# Patient Record
Sex: MALE | Race: WHITE | Employment: OTHER | URBAN - METROPOLITAN AREA
[De-identification: names, ages, dates, MRNs, and addresses within clinical notes are randomized per-mention and may not be internally consistent; named-entity substitution may affect disease eponyms.]

---

## 2022-04-25 ENCOUNTER — HOSPITAL ENCOUNTER (INPATIENT)
Age: 87
LOS: 4 days | Discharge: HOME HEALTH | DRG: 871 | End: 2022-04-29
Attending: EMERGENCY MEDICINE | Admitting: HOSPITALIST
Payer: MEDICARE

## 2022-04-25 ENCOUNTER — APPOINTMENT (OUTPATIENT)
Dept: CT IMAGING | Age: 87
DRG: 871 | End: 2022-04-25
Attending: EMERGENCY MEDICINE
Payer: MEDICARE

## 2022-04-25 ENCOUNTER — APPOINTMENT (OUTPATIENT)
Dept: GENERAL RADIOLOGY | Age: 87
DRG: 871 | End: 2022-04-25
Attending: EMERGENCY MEDICINE
Payer: MEDICARE

## 2022-04-25 DIAGNOSIS — R50.9 FEVER, UNSPECIFIED FEVER CAUSE: ICD-10-CM

## 2022-04-25 DIAGNOSIS — N39.0 URINARY TRACT INFECTION WITHOUT HEMATURIA, SITE UNSPECIFIED: ICD-10-CM

## 2022-04-25 DIAGNOSIS — J10.1 INFLUENZA A: ICD-10-CM

## 2022-04-25 DIAGNOSIS — R41.82 ALTERED MENTAL STATUS, UNSPECIFIED ALTERED MENTAL STATUS TYPE: Primary | ICD-10-CM

## 2022-04-25 LAB
ALBUMIN SERPL-MCNC: 3.8 G/DL (ref 3.5–5)
ALBUMIN/GLOB SERPL: 1.1 {RATIO} (ref 1.1–2.2)
ALP SERPL-CCNC: 128 U/L (ref 45–117)
ALT SERPL-CCNC: 30 U/L (ref 12–78)
ANION GAP SERPL CALC-SCNC: 5 MMOL/L (ref 5–15)
APPEARANCE UR: ABNORMAL
AST SERPL-CCNC: 38 U/L (ref 15–37)
ATRIAL RATE: 71 BPM
BACTERIA URNS QL MICRO: ABNORMAL /HPF
BASOPHILS # BLD: 0 K/UL (ref 0–0.1)
BASOPHILS NFR BLD: 1 % (ref 0–1)
BILIRUB SERPL-MCNC: 0.7 MG/DL (ref 0.2–1)
BILIRUB UR QL: NEGATIVE
BUN SERPL-MCNC: 21 MG/DL (ref 6–20)
BUN/CREAT SERPL: 17 (ref 12–20)
CALCIUM SERPL-MCNC: 8.3 MG/DL (ref 8.5–10.1)
CALCULATED P AXIS, ECG09: 47 DEGREES
CALCULATED R AXIS, ECG10: 104 DEGREES
CALCULATED T AXIS, ECG11: 29 DEGREES
CHLORIDE SERPL-SCNC: 102 MMOL/L (ref 97–108)
CO2 SERPL-SCNC: 28 MMOL/L (ref 21–32)
COLOR UR: ABNORMAL
COMMENT, HOLDF: NORMAL
COVID-19 RAPID TEST, COVR: NOT DETECTED
CREAT SERPL-MCNC: 1.21 MG/DL (ref 0.7–1.3)
DIAGNOSIS, 93000: NORMAL
DIFFERENTIAL METHOD BLD: ABNORMAL
EOSINOPHIL # BLD: 0 K/UL (ref 0–0.4)
EOSINOPHIL NFR BLD: 0 % (ref 0–7)
EPITH CASTS URNS QL MICRO: ABNORMAL /LPF
ERYTHROCYTE [DISTWIDTH] IN BLOOD BY AUTOMATED COUNT: 13.9 % (ref 11.5–14.5)
FLUAV AG NPH QL IA: POSITIVE
FLUBV AG NOSE QL IA: NEGATIVE
GLOBULIN SER CALC-MCNC: 3.5 G/DL (ref 2–4)
GLUCOSE BLD STRIP.AUTO-MCNC: 93 MG/DL (ref 65–117)
GLUCOSE SERPL-MCNC: 94 MG/DL (ref 65–100)
GLUCOSE UR STRIP.AUTO-MCNC: NEGATIVE MG/DL
HCT VFR BLD AUTO: 45.4 % (ref 36.6–50.3)
HGB BLD-MCNC: 14.7 G/DL (ref 12.1–17)
HGB UR QL STRIP: ABNORMAL
IMM GRANULOCYTES # BLD AUTO: 0 K/UL (ref 0–0.04)
IMM GRANULOCYTES NFR BLD AUTO: 0 % (ref 0–0.5)
KETONES UR QL STRIP.AUTO: ABNORMAL MG/DL
LACTATE BLD-SCNC: 0.55 MMOL/L (ref 0.4–2)
LEUKOCYTE ESTERASE UR QL STRIP.AUTO: ABNORMAL
LYMPHOCYTES # BLD: 1.5 K/UL (ref 0.8–3.5)
LYMPHOCYTES NFR BLD: 18 % (ref 12–49)
MCH RBC QN AUTO: 29.9 PG (ref 26–34)
MCHC RBC AUTO-ENTMCNC: 32.4 G/DL (ref 30–36.5)
MCV RBC AUTO: 92.3 FL (ref 80–99)
MONOCYTES # BLD: 1.3 K/UL (ref 0–1)
MONOCYTES NFR BLD: 15 % (ref 5–13)
NEUTS SEG # BLD: 5.7 K/UL (ref 1.8–8)
NEUTS SEG NFR BLD: 66 % (ref 32–75)
NITRITE UR QL STRIP.AUTO: NEGATIVE
NRBC # BLD: 0 K/UL (ref 0–0.01)
NRBC BLD-RTO: 0 PER 100 WBC
P-R INTERVAL, ECG05: 128 MS
PH UR STRIP: 7.5 [PH] (ref 5–8)
PLATELET # BLD AUTO: 216 K/UL (ref 150–400)
PMV BLD AUTO: 10.3 FL (ref 8.9–12.9)
POTASSIUM SERPL-SCNC: 4.2 MMOL/L (ref 3.5–5.1)
PROT SERPL-MCNC: 7.3 G/DL (ref 6.4–8.2)
PROT UR STRIP-MCNC: 100 MG/DL
Q-T INTERVAL, ECG07: 396 MS
QRS DURATION, ECG06: 126 MS
QTC CALCULATION (BEZET), ECG08: 430 MS
RBC # BLD AUTO: 4.92 M/UL (ref 4.1–5.7)
RBC #/AREA URNS HPF: ABNORMAL /HPF (ref 0–5)
SAMPLES BEING HELD,HOLD: NORMAL
SERVICE CMNT-IMP: NORMAL
SODIUM SERPL-SCNC: 135 MMOL/L (ref 136–145)
SOURCE, COVRS: NORMAL
SP GR UR REFRACTOMETRY: 1.02 (ref 1–1.03)
TROPONIN-HIGH SENSITIVITY: 50 NG/L (ref 0–76)
UA: UC IF INDICATED,UAUC: ABNORMAL
UROBILINOGEN UR QL STRIP.AUTO: 1 EU/DL (ref 0.2–1)
VENTRICULAR RATE, ECG03: 71 BPM
WBC # BLD AUTO: 8.6 K/UL (ref 4.1–11.1)
WBC URNS QL MICRO: ABNORMAL /HPF (ref 0–4)

## 2022-04-25 PROCEDURE — 74011250636 HC RX REV CODE- 250/636: Performed by: HOSPITALIST

## 2022-04-25 PROCEDURE — 87086 URINE CULTURE/COLONY COUNT: CPT

## 2022-04-25 PROCEDURE — 99285 EMERGENCY DEPT VISIT HI MDM: CPT

## 2022-04-25 PROCEDURE — 87635 SARS-COV-2 COVID-19 AMP PRB: CPT

## 2022-04-25 PROCEDURE — 93005 ELECTROCARDIOGRAM TRACING: CPT

## 2022-04-25 PROCEDURE — 70450 CT HEAD/BRAIN W/O DYE: CPT

## 2022-04-25 PROCEDURE — 87804 INFLUENZA ASSAY W/OPTIC: CPT

## 2022-04-25 PROCEDURE — 80053 COMPREHEN METABOLIC PANEL: CPT

## 2022-04-25 PROCEDURE — 87186 SC STD MICRODIL/AGAR DIL: CPT

## 2022-04-25 PROCEDURE — 86710 INFLUENZA VIRUS ANTIBODY: CPT

## 2022-04-25 PROCEDURE — 85025 COMPLETE CBC W/AUTO DIFF WBC: CPT

## 2022-04-25 PROCEDURE — 87077 CULTURE AEROBIC IDENTIFY: CPT

## 2022-04-25 PROCEDURE — 84484 ASSAY OF TROPONIN QUANT: CPT

## 2022-04-25 PROCEDURE — 74011250637 HC RX REV CODE- 250/637: Performed by: HOSPITALIST

## 2022-04-25 PROCEDURE — 74011000250 HC RX REV CODE- 250: Performed by: HOSPITALIST

## 2022-04-25 PROCEDURE — 74011250636 HC RX REV CODE- 250/636: Performed by: EMERGENCY MEDICINE

## 2022-04-25 PROCEDURE — 81001 URINALYSIS AUTO W/SCOPE: CPT

## 2022-04-25 PROCEDURE — 74011250637 HC RX REV CODE- 250/637: Performed by: EMERGENCY MEDICINE

## 2022-04-25 PROCEDURE — 65270000046 HC RM TELEMETRY

## 2022-04-25 PROCEDURE — 82962 GLUCOSE BLOOD TEST: CPT

## 2022-04-25 PROCEDURE — 71045 X-RAY EXAM CHEST 1 VIEW: CPT

## 2022-04-25 PROCEDURE — 87040 BLOOD CULTURE FOR BACTERIA: CPT

## 2022-04-25 PROCEDURE — 77030019905 HC CATH URETH INTMIT MDII -A

## 2022-04-25 PROCEDURE — 83605 ASSAY OF LACTIC ACID: CPT

## 2022-04-25 PROCEDURE — 36415 COLL VENOUS BLD VENIPUNCTURE: CPT

## 2022-04-25 RX ORDER — HALOPERIDOL 5 MG/ML
2 INJECTION INTRAMUSCULAR
Status: COMPLETED | OUTPATIENT
Start: 2022-04-25 | End: 2022-04-25

## 2022-04-25 RX ORDER — SODIUM CHLORIDE 0.9 % (FLUSH) 0.9 %
5-40 SYRINGE (ML) INJECTION EVERY 8 HOURS
Status: DISCONTINUED | OUTPATIENT
Start: 2022-04-25 | End: 2022-04-29 | Stop reason: HOSPADM

## 2022-04-25 RX ORDER — SENNOSIDES 8.6 MG/1
2 TABLET ORAL
Status: DISCONTINUED | OUTPATIENT
Start: 2022-04-25 | End: 2022-04-29 | Stop reason: HOSPADM

## 2022-04-25 RX ORDER — HALOPERIDOL 5 MG/ML
2 INJECTION INTRAMUSCULAR
Status: DISCONTINUED | OUTPATIENT
Start: 2022-04-25 | End: 2022-04-29 | Stop reason: HOSPADM

## 2022-04-25 RX ORDER — ETHYL ALCOHOL 70 %
SOLUTION, NON-ORAL TOPICAL 2 TIMES DAILY
COMMUNITY

## 2022-04-25 RX ORDER — LEVOFLOXACIN 5 MG/ML
750 INJECTION, SOLUTION INTRAVENOUS
Status: DISCONTINUED | OUTPATIENT
Start: 2022-04-25 | End: 2022-04-27

## 2022-04-25 RX ORDER — SENNOSIDES 8.6 MG/1
2 TABLET ORAL
COMMUNITY

## 2022-04-25 RX ORDER — MEMANTINE HYDROCHLORIDE 10 MG/1
10 TABLET ORAL 2 TIMES DAILY
Status: DISCONTINUED | OUTPATIENT
Start: 2022-04-25 | End: 2022-04-29 | Stop reason: HOSPADM

## 2022-04-25 RX ORDER — HALOPERIDOL 5 MG/ML
2 INJECTION INTRAMUSCULAR
Status: DISCONTINUED | OUTPATIENT
Start: 2022-04-25 | End: 2022-04-25

## 2022-04-25 RX ORDER — MEMANTINE HYDROCHLORIDE 10 MG/1
10 TABLET ORAL 2 TIMES DAILY
COMMUNITY

## 2022-04-25 RX ORDER — SERTRALINE HYDROCHLORIDE 50 MG/1
25 TABLET, FILM COATED ORAL DAILY
Status: DISCONTINUED | OUTPATIENT
Start: 2022-04-26 | End: 2022-04-29 | Stop reason: HOSPADM

## 2022-04-25 RX ORDER — ACETAMINOPHEN 325 MG/1
325 TABLET ORAL
Status: DISCONTINUED | OUTPATIENT
Start: 2022-04-25 | End: 2022-04-25

## 2022-04-25 RX ORDER — HEPARIN SODIUM 5000 [USP'U]/ML
5000 INJECTION, SOLUTION INTRAVENOUS; SUBCUTANEOUS EVERY 8 HOURS
Status: DISCONTINUED | OUTPATIENT
Start: 2022-04-25 | End: 2022-04-29 | Stop reason: HOSPADM

## 2022-04-25 RX ORDER — ACETAMINOPHEN 325 MG/1
650 TABLET ORAL
Status: DISCONTINUED | OUTPATIENT
Start: 2022-04-25 | End: 2022-04-29 | Stop reason: HOSPADM

## 2022-04-25 RX ORDER — DONEPEZIL HYDROCHLORIDE 10 MG/1
10 TABLET, FILM COATED ORAL
Status: DISCONTINUED | OUTPATIENT
Start: 2022-04-25 | End: 2022-04-29 | Stop reason: HOSPADM

## 2022-04-25 RX ORDER — ACETAMINOPHEN 325 MG/1
325 TABLET ORAL
COMMUNITY

## 2022-04-25 RX ORDER — ACETAMINOPHEN 650 MG/1
650 SUPPOSITORY RECTAL
Status: COMPLETED | OUTPATIENT
Start: 2022-04-25 | End: 2022-04-25

## 2022-04-25 RX ORDER — QUETIAPINE FUMARATE 100 MG/1
50 TABLET, FILM COATED ORAL
Status: DISCONTINUED | OUTPATIENT
Start: 2022-04-25 | End: 2022-04-29 | Stop reason: HOSPADM

## 2022-04-25 RX ORDER — ACETAMINOPHEN 325 MG/1
650 TABLET ORAL
Status: ACTIVE | OUTPATIENT
Start: 2022-04-25 | End: 2022-04-25

## 2022-04-25 RX ORDER — SODIUM CHLORIDE 0.9 % (FLUSH) 0.9 %
5-40 SYRINGE (ML) INJECTION AS NEEDED
Status: DISCONTINUED | OUTPATIENT
Start: 2022-04-25 | End: 2022-04-29 | Stop reason: HOSPADM

## 2022-04-25 RX ADMIN — HALOPERIDOL LACTATE 2 MG: 5 INJECTION, SOLUTION INTRAMUSCULAR at 22:18

## 2022-04-25 RX ADMIN — HALOPERIDOL LACTATE 2 MG: 5 INJECTION, SOLUTION INTRAMUSCULAR at 14:39

## 2022-04-25 RX ADMIN — HALOPERIDOL LACTATE 2 MG: 5 INJECTION, SOLUTION INTRAMUSCULAR at 16:25

## 2022-04-25 RX ADMIN — ACETAMINOPHEN 650 MG: 650 SUPPOSITORY RECTAL at 08:06

## 2022-04-25 RX ADMIN — SODIUM CHLORIDE 1000 ML: 9 INJECTION, SOLUTION INTRAVENOUS at 07:34

## 2022-04-25 RX ADMIN — SODIUM CHLORIDE, PRESERVATIVE FREE 10 ML: 5 INJECTION INTRAVENOUS at 22:11

## 2022-04-25 RX ADMIN — LEVOFLOXACIN 750 MG: 5 INJECTION, SOLUTION INTRAVENOUS at 11:51

## 2022-04-25 RX ADMIN — HEPARIN SODIUM 5000 UNITS: 5000 INJECTION INTRAVENOUS; SUBCUTANEOUS at 22:09

## 2022-04-25 NOTE — PROGRESS NOTES
Admission Medication Reconciliation:    Information obtained from:  medication list from [facility] (scanned into media)  RxQuery data available¹:  NO    Comments/Recommendations: All medications/allergies have been reviewed and updated; all medication information obtained from medication papers from the [facility]. Changes made to Prior to Admission (PTA) Medication List:   ?   Medications Added:   - A&D ointment   ? Medications Changed:   - None   ? Medications Removed:   - None     ¹RxQuery pharmacy benefit data reflects medications filled and processed through the patient's insurance, however   this data does NOT capture whether the medication was picked up or is currently being taken by the patient. Allergies:  Lipitor [atorvastatin], Pcn [penicillins], and Pravastatin    Significant PMH/Disease States:   Past Medical History:   Diagnosis Date    Bleeding duodenal ulcer     BPH (benign prostatic hyperplasia) 10/9/2013    Cancer ([facility] [address]     melanoma    Carpal tunnel syndrome     DJD (degenerative joint disease)     GERD (gastroesophageal reflux disease) 1/25/2012    Glucose intolerance (impaired glucose tolerance) 7/20/2011    HTN (hypertension) 7/20/2011    Hyperlipidemia 7/20/2011    Lumbar spinal stenosis     Mastoiditis     Memory changes 8/20/2015    PAF (paroxysmal atrial fibrillation) ([facility] [address] 7/20/2011    RBBB (right bundle branch block)     Zoster        Chief Complaint for this Admission:    Chief Complaint   Patient presents with    Altered mental status     ED visit d/t AMS - comes from SNF ([place]), unknown LKWT - pt's son reports pt with diff ambulating / impulsive behavior / combative at times - mental baseline, was able to speak and have understanding of surround - temp per EMS was 102.0 / FS 85       Prior to Admission Medications:   Prior to Admission Medications   Prescriptions Last Dose Informant Patient Reported?  Taking?   acetaminophen (TylenoL) 325 mg tablet 4/18/2022 at Unknown time  Yes Yes   Sig: Take 325 mg by mouth every four (4) hours as needed for Pain. Indications: fever, pain   donepezil (ARICEPT) 10 mg tablet 4/18/2022 at Unknown time  No Yes   Sig: Take 1 Tab by mouth daily (after dinner). memantine (Namenda) 10 mg tablet 4/18/2022 at Unknown time  Yes Yes   Sig: Take 10 mg by mouth two (2) times a day. senna (Senna) 8.6 mg tablet 4/18/2022 at Unknown time  Yes Yes   Sig: Take 2 Tablets by mouth every six (6) hours as needed for Constipation. Indications: constipation   sertraline (ZOLOFT) 50 mg tablet 4/18/2022 at Unknown time  No Yes   Sig: Days 1-7, take 25 mg daily. Starting day 8 and thereafter, 50 mg daily   Patient taking differently: 25 mg. Days 1-7, take 25 mg daily. Starting day 8 and thereafter, 50 mg daily    simvastatin (ZOCOR) 5 mg tablet 4/18/2022 at Unknown time  No Yes   Sig: TAKE ONE (1) TABLET(S) DAILY AT BEDTIME   vitamin a & d (A&D) ointment   Yes Yes   Sig: Apply  to affected area two (2) times a day. Apply to sacrum      Facility-Administered Medications: None         Thank you for allowing pharmacy to participate in the coordination of this patient's care. If you have any other questions, please contact the medication reconciliation pharmacist at x [phone]. Chris Rodriguez Pharm. D., [facility]
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
## 2022-04-25 NOTE — ACP (ADVANCE CARE PLANNING)
Advance Care Planning     Advance Care Planning (ACP) Physician/NP/PA Conversation      Date of Conversation: 4/25/2022  Conducted with: Healthcare Decision Maker: Dm Figueroa and [address] Decision Maker:   No healthcare decision makers have been documented. Click here to complete [address] including selection of the Healthcare Decision Maker Relationship (ie \"Primary\")    Today we documented Decision Maker(s) consistent with Legal Next of Kin hierarchy. Care Preferences:    Hospitalization: \"If your health worsens and it becomes clear that your chance of recovery is unlikely, what would be your preference regarding hospitalization? \"  DNR    Ventilation: \"If you were unable to breathe on your own and your chance of recovery was unlikely, what would be your preference about the use of a ventilator (breathing machine) if it was available to you? \"   The patient would NOT desire the use of a ventilator. and DNR    Resuscitation: \"In the event your heart stopped as a result of an underlying serious health condition, would you want attempts to be made to restart your heart, or would you prefer a natural death? \"   No, do NOT attempt to resuscitate.       Conversation Outcomes / Follow-Up Plan:   ACP complete - no further action today  Reviewed DNR/DNI and patient elects DNR order - completed portable DNR form & placed order     Length of Voluntary ACP Conversation in minutes:  16 minutes    Princess Shakila MD
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
## 2022-04-25 NOTE — ED NOTES
Bedside and Verbal shift change report given to Bryanna Cline RN (oncoming nurse) by Boaz Gomes RN (offgoing nurse). Report included the following information SBAR, ED Summary, MAR and Recent Results.

## 2022-04-25 NOTE — PROGRESS NOTES
Renal Dosing/Monitoring  Medication: Levaquin  Current regimen:  750 mg IV every 24 hr  Recent Labs     04/25/22  0723   CREA 1.21   BUN 21*     Estimated CrCl:  46 ml/min  Plan:   Adjusted dose to 750 mg IV q48 hour for crcl 20-49 per protocol.
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
## 2022-04-25 NOTE — ED NOTES
TRANSFER - OUT REPORT:    Verbal report given to Kelsey(name) on Brock Maddox  being transferred to 2N room 207(unit) for routine progression of care       Report consisted of patients Situation, Background, Assessment and   Recommendations(SBAR). Information from the following report(s) SBAR, ED Summary, Intake/Output, MAR and Recent Results was reviewed with the receiving nurse. Lines:   Peripheral IV 04/25/22 Right Antecubital (Active)   Site Assessment Clean, dry, & intact 04/25/22 0724   Phlebitis Assessment 0 04/25/22 0724   Infiltration Assessment 0 04/25/22 0724   Dressing Status Clean, dry, & intact 04/25/22 0724   Dressing Type Tape;Transparent 04/25/22 0724        Opportunity for questions and clarification was provided.       Patient transported with:   [facility]

## 2022-04-25 NOTE — PROGRESS NOTES
Occupational Therapy   [phone]    Orders acknowledged and chart reviewed in prep for OT evaluation. RN reporting patient physically aggressive with staff during all care and reporting he is not appropriate for skilled intervention. Will defer and f/u as able. Thank you. [place] L. Valentino Bright, MS, OTR/L

## 2022-04-26 LAB
GLUCOSE BLD STRIP.AUTO-MCNC: 109 MG/DL (ref 65–117)
SERVICE CMNT-IMP: NORMAL

## 2022-04-26 PROCEDURE — 74011000250 HC RX REV CODE- 250: Performed by: HOSPITALIST

## 2022-04-26 PROCEDURE — 74011250636 HC RX REV CODE- 250/636: Performed by: HOSPITALIST

## 2022-04-26 PROCEDURE — 65270000046 HC RM TELEMETRY

## 2022-04-26 PROCEDURE — 97161 PT EVAL LOW COMPLEX 20 MIN: CPT

## 2022-04-26 PROCEDURE — 97530 THERAPEUTIC ACTIVITIES: CPT

## 2022-04-26 PROCEDURE — 74011250637 HC RX REV CODE- 250/637: Performed by: HOSPITALIST

## 2022-04-26 PROCEDURE — 82962 GLUCOSE BLOOD TEST: CPT

## 2022-04-26 PROCEDURE — 92610 EVALUATE SWALLOWING FUNCTION: CPT | Performed by: SPEECH-LANGUAGE PATHOLOGIST

## 2022-04-26 PROCEDURE — 97165 OT EVAL LOW COMPLEX 30 MIN: CPT

## 2022-04-26 PROCEDURE — 97535 SELF CARE MNGMENT TRAINING: CPT

## 2022-04-26 RX ORDER — GUAIFENESIN 100 MG/5ML
81 LIQUID (ML) ORAL DAILY
Status: DISCONTINUED | OUTPATIENT
Start: 2022-04-27 | End: 2022-04-29 | Stop reason: HOSPADM

## 2022-04-26 RX ORDER — DEXTROSE MONOHYDRATE AND SODIUM CHLORIDE 5; .45 G/100ML; G/100ML
75 INJECTION, SOLUTION INTRAVENOUS CONTINUOUS
Status: DISCONTINUED | OUTPATIENT
Start: 2022-04-26 | End: 2022-04-28

## 2022-04-26 RX ADMIN — MEMANTINE HYDROCHLORIDE 10 MG: 10 TABLET ORAL at 10:27

## 2022-04-26 RX ADMIN — SODIUM CHLORIDE, PRESERVATIVE FREE 10 ML: 5 INJECTION INTRAVENOUS at 06:02

## 2022-04-26 RX ADMIN — SERTRALINE 25 MG: 50 TABLET, FILM COATED ORAL at 10:27

## 2022-04-26 RX ADMIN — DEXTROSE AND SODIUM CHLORIDE 75 ML/HR: 5; 450 INJECTION, SOLUTION INTRAVENOUS at 10:27

## 2022-04-26 RX ADMIN — DONEPEZIL HYDROCHLORIDE 10 MG: 10 TABLET, FILM COATED ORAL at 18:50

## 2022-04-26 RX ADMIN — MEMANTINE HYDROCHLORIDE 10 MG: 10 TABLET ORAL at 18:50

## 2022-04-26 RX ADMIN — HEPARIN SODIUM 5000 UNITS: 5000 INJECTION INTRAVENOUS; SUBCUTANEOUS at 06:02

## 2022-04-26 RX ADMIN — HEPARIN SODIUM 5000 UNITS: 5000 INJECTION INTRAVENOUS; SUBCUTANEOUS at 15:00

## 2022-04-26 RX ADMIN — HALOPERIDOL LACTATE 2 MG: 5 INJECTION, SOLUTION INTRAMUSCULAR at 02:50

## 2022-04-26 RX ADMIN — SODIUM CHLORIDE, PRESERVATIVE FREE 10 ML: 5 INJECTION INTRAVENOUS at 14:00

## 2022-04-26 RX ADMIN — QUETIAPINE FUMARATE 50 MG: 100 TABLET ORAL at 21:59

## 2022-04-26 RX ADMIN — SODIUM CHLORIDE, PRESERVATIVE FREE 10 ML: 5 INJECTION INTRAVENOUS at 22:02

## 2022-04-26 RX ADMIN — HEPARIN SODIUM 5000 UNITS: 5000 INJECTION INTRAVENOUS; SUBCUTANEOUS at 21:59

## 2022-04-26 NOTE — PROGRESS NOTES
Transition of Care Plan   RUR- 11% Moderate Risk   DISPOSITION: The disposition plan is pending medical progression and recommendation.  F/U with PCP/Specialist     Transport: [facility] (memory care unit)  [address], [address] [facility]  Phone Number: [phone]    SW: Shanthi Hayeswell: [phone]    Reason for Admission:  \"unstable on feet and cursing\"                   RUR Score:  11% Moderate Risk                   Plan for utilizing home health: N/A         PCP: First and Last name:  Luke Pierre MD     Name of Practice: [facility] current patient: Yes/No: Yes   Approximate date of last visit: at facility, a week or so ago   Can you participate in a virtual visit with your PCP: N/A                    Current Advanced Directive/Advance Care Plan: DNR  Has ACP documentation on file at this time. Healthcare Decision Maker:   Click here to complete [address] including selection of the Healthcare Decision Maker Relationship (ie \"Primary\")             Primary Decision Maker: Elke Multani - Son - [phone]                  Transition of Care Plan:  Return to Memory Care Unit at [facility]                    Reviewed chart for transitions of care, and discussed in rounds. CM met with patient at bedside to explain role and offer support. CM met with patient and patients sons at bedside to complete this assessment. Baseline: DME (none) but has a cane  ADLs/IDALS: Needs assistance   Previous Home Health: N/A  Previous SNF/IPR: N/A  ER Contact:  Rosalie Nassar - [phone]    Patient lives on a memory care unit at [facility]. Patient needs assistance with ADLs/IDALs. Patient uses DMEs (none) - has a cane to ambulate. Patient's preferred pharmacy is at facility. Patient's son is expected to transport at discharge. Care Management Interventions  PCP Verified by CM:  Yes  Palliative Care Criteria Met (RRAT>21 & CHF Dx)?: No  Mode of Transport at Discharge: Other (see comment)  Transition of Care Consult (CM Consult): Discharge Planning  MyChart Signup: No  Discharge Durable Medical Equipment: No  Physical Therapy Consult: Yes  Occupational Therapy Consult: Yes  Speech Therapy Consult: No  Support Systems: Child(cristopher),Other Family Member(s)  Confirm Follow Up Transport: Family  The Patient and/or Patient Representative was Provided with a Choice of Provider and Agrees with the Discharge Plan?: Yes  Freedom of Choice List was Provided with Basic Dialogue that Supports the Patient's Individualized Plan of Care/Goals, Treatment Preferences and Shares the Quality Data Associated with the Providers?: Yes   Resource Information Provided?: No  Discharge Location  Patient Expects to be Discharged to[de-identified] Other:    Medicare pt has received, reviewed, and signed 1st IM letter informing them of their right to appeal the discharge. Signed copy has been placed on pt bedside chart.     WILL Franks, NICOLE, LMHP-e  Available in [employer]

## 2022-04-26 NOTE — PROGRESS NOTES
TRANSFER - IN REPORT:    Verbal report received from [facility]) on Adah Sender  being received from ED(unit) for routine progression of care      Report consisted of patients Situation, Background, Assessment and   Recommendations(SBAR). Information from the following report(s) SBAR, Kardex, [facility] and Recent Results was reviewed with the receiving nurse. Opportunity for questions and clarification was provided. Assessment completed upon patients arrival to unit and care assumed.

## 2022-04-26 NOTE — PROGRESS NOTES
[address] [facility] Adult  Hospitalist Group                                                                                          Hospitalist Progress Note  Glo Pina MD  Answering service: [phone] from in house phone        Date of Service:  2022  NAME:  Jj Kay  :  1933  MRN:  [id number]      Admission Summary: This is an 51-year-old  male with past medical history significant for memory impairment, paroxysmal AFib, hypertension, dyslipidemia, right bundle branch block, lumbar spinal stenosis, gastroesophageal reflux disease, and BPH, who is transferred from the nursing home after he became altered with agitation and restless. He had also fever of 103. It was unable to get detailed information from him. His son was at the bedside. He stated that the last time he saw him was on 2022. At that time, he was relatively stable but he had on and off confusion, agitation and restlessness then worse for the last couple of days. He said his Zoloft dose recently reduced. No cough. Patient denies cough, left-sided chest pain, nausea, vomiting, or abdominal pain. The patient was restless, agitated, and combative in ER, Haldol IV 2 mg was given, placed on restraints and his son was in the room. His vital signs were blood pressure of 130/96, pulse 118, temperature 102.1, saturation of oxygen 100% on room air. CT scan of the head was done and no acute intracranial findings. .  Chest x-ray, trace bilateral pleural effusion and mild patchy bibasilar airspace opacities which may represent atelectasis, and the patient referred to hospitalist service for further evaluation and admission. Interval history / Subjective:     Patient conscious and alert, restless, on soft restraint, trying to go out of bed, incoherent speech, follow simple command,   sitter at bedside.    His sons has visited him today    I tried to reach his son,  Vandana Miguel at [phone] and was a voice mail, will try later     Assessment & Plan:     Delirium, Acute metabolic encephalopathy with underlying dementia  -CT head no acute intracranial pathology   -patient is conscious and alert, disoriented, restless and agitated  -continue seroquel 50 mg q hs, prn haldol   -check EKG   -continue soft restraint  -continue neuro check and supportive care   -consult to psychiatrist   -continue sitter . UTI with sepsis POA  -fever, tachycardia, change in mental status   -continue IV levaquin, IVF  -follow up urine and blood cx result  -patient allergic to PCN    Possible pneumonia.  -chest x ray trace bilateral pleural effusions and mild patchy bibasilar airspace opacities which may represent atelectasis. -continue levaquin and IVF  -SpO2 90-94% on RA    HTN  -BP BP normal, monitor BP    Hx of Paroxysmal atrial fibrillation, now NSR  -add aspirin    Prepuce or foreskin meatus stricture.  -urologist is consulted    Hx of benign prostatic hyperplasia. -stable    Hx of dementia with agitation.  -on aricept, namenda, zoloft and  seroquel    Dyslipidemia.  -can resume zocor on discharge           Code status: DNR  Prophylaxis: Heparin  Care Plan discussed with:   Anticipated Disposition:      Hospital Problems  Date Reviewed: 1/31/2017          Codes Class Noted POA    Fever ICD-10-CM: R50.9  ICD-9-CM: 780.60  4/25/2022 Unknown               Vital Signs:    Last 24hrs VS reviewed since prior progress note.  Most recent are:  Visit Vitals  /70   Pulse 78   Temp 97.6 °F (36.4 °C)   Resp 13   Ht 6' (1.829 m)   Wt 79.4 kg (175 lb 0.7 oz)   SpO2 94%   BMI 23.74 kg/m²         Intake/Output Summary (Last 24 hours) at 4/26/2022 6101  Last data filed at 4/25/2022 4609  Gross per 24 hour   Intake 1000 ml   Output    Net 1000 ml        Physical Examination:     I had a face to face encounter with this patient and independently examined them on 4/26/2022 as outlined below:          Constitutional:  No acute distress, cooperative, pleasant    ENT:  Oral mucosa moist, oropharynx benign. Resp:  CTA bilaterally. No wheezing/rhonchi/rales. No accessory muscle use. CV:  Regular rhythm, normal rate, no murmurs, gallops, rubs    GI:  Soft, non distended, non tender. normoactive bowel sounds, no hepatosplenomegaly     Musculoskeletal:  No edema,     Neurologic:  Conscious and alert, disoriented, restless, moves all extremities             Data Review:    Review and/or order of clinical lab test  Review and/or order of tests in the radiology section of CPT  Review and/or order of tests in the medicine section of CPT      Labs:     Recent Labs     04/25/22 0723   WBC 8.6   HGB 14.7   HCT 45.4        Recent Labs     04/25/22 0723   *   K 4.2      CO2 28   BUN 21*   CREA 1.21   GLU 94   CA 8.3*     Recent Labs     04/25/22 0723   ALT 30   *   TBILI 0.7   TP 7.3   ALB 3.8   GLOB 3.5     No results for input(s): INR, PTP, APTT, INREXT in the last 72 hours. No results for input(s): FE, TIBC, PSAT, FERR in the last 72 hours. No results found for: FOL, RBCF   No results for input(s): PH, PCO2, PO2 in the last 72 hours. No results for input(s): CPK, CKNDX, TROIQ in the last 72 hours.     No lab exists for component: CPKMB  No results found for: CHOL, CHOLX, CHLST, CHOLV, HDL, HDLP, LDL, LDLC, DLDLP, TGLX, TRIGL, TRIGP, CHHD, CHHDX  Lab Results   Component Value Date/Time    Glucose (POC) 93 04/25/2022 07:22 AM    Glucose  (A) 10/09/2013 10:42 AM    Glucose  (A) 06/04/2013 10:16 AM    Glucose  (A) 09/26/2012 09:05 AM     Lab Results   Component Value Date/Time    Color YELLOW/STRAW 04/25/2022 09:11 AM    Appearance TURBID (A) 04/25/2022 09:11 AM    Specific gravity 1.018 04/25/2022 09:11 AM    pH (UA) 7.5 04/25/2022 09:11 AM    Protein 100 (A) 04/25/2022 09:11 AM    Glucose Negative 04/25/2022 09:11 AM    Ketone TRACE (A) 04/25/2022 09:11 AM    Bilirubin Negative 04/25/2022 09:11 AM    Urobilinogen 1.0 04/25/2022 09:11 AM    Nitrites Negative 04/25/2022 09:11 AM    Leukocyte Esterase LARGE (A) 04/25/2022 09:11 AM    Epithelial cells FEW 04/25/2022 09:11 AM    Bacteria 3+ (A) 04/25/2022 09:11 AM    WBC 20-50 04/25/2022 09:11 AM    RBC 0-5 04/25/2022 09:11 AM         Medications Reviewed:     Current Facility-Administered Medications   Medication Dose Route Frequency    donepeziL (ARICEPT) tablet 10 mg  10 mg Oral PCD    memantine (NAMENDA) tablet 10 mg  10 mg Oral BID    senna (SENOKOT) tablet 17.2 mg  2 Tablet Oral Q6H PRN    sertraline (ZOLOFT) tablet 25 mg  25 mg Oral DAILY    sodium chloride (NS) flush 5-40 mL  5-40 mL IntraVENous Q8H    sodium chloride (NS) flush 5-40 mL  5-40 mL IntraVENous PRN    heparin (porcine) injection 5,000 Units  5,000 Units SubCUTAneous Q8H    levoFLOXacin (LEVAQUIN) 750 mg in D5W IVPB  750 mg IntraVENous Q48H    acetaminophen (TYLENOL) tablet 650 mg  650 mg Oral Q4H PRN    haloperidol lactate (HALDOL) injection 2 mg  2 mg IntraMUSCular Q4H PRN    QUEtiapine (SEROquel) tablet 50 mg  50 mg Oral QHS     ______________________________________________________________________  EXPECTED LENGTH OF STAY: - - -  ACTUAL LENGTH OF STAY:          1                 Georgena Kehr, MD

## 2022-04-26 NOTE — PROGRESS NOTES
Problem: Dysphagia (Adult)  Goal: *Acute Goals and Plan of Care (Insert Text)  Description: Initiated 4/26/2022  1. Patient will tolerate regular diet with thin liquids free of sequelae of aspiration within 7 days. Outcome: Not Met   SPEECH LANGUAGE PATHOLOGY BEDSIDE SWALLOW EVALUATION  Patient: Jessy Hopkins (83 y.o. male)  Date: 4/26/2022  Primary Diagnosis: Fever [R50.9]        Precautions: fall, droplet       ASSESSMENT :  Based on the objective data described below, the patient presents with mild oral dysphagia related to xerostomia and lack of dentition. Suspect functional pharyngeal swallow. He coughed once at the end of the session, though not necessarily with PO intake. He is flu +. Son present for session and reports patient eats a regular diet at baseline. Patient will benefit from skilled intervention to address the above impairments. Patients rehabilitation potential is considered to be Good     PLAN :  Recommendations and Planned Interventions:  Regular diet, thin liquids  Feed when awake and alert  Frequency/Duration: Patient will be followed by speech-language pathology 1 time a week to address goals. Discharge Recommendations: To Be Determined     SUBJECTIVE:   Patient stated Dustin Christianches you for coming, you come back now.     OBJECTIVE:     Past Medical History:   Diagnosis Date    Bleeding duodenal ulcer     BPH (benign prostatic hyperplasia) 10/9/2013    Cancer ([facility] [address]     melanoma    Carpal tunnel syndrome     DJD (degenerative joint disease)     GERD (gastroesophageal reflux disease) 1/25/2012    Glucose intolerance (impaired glucose tolerance) 7/20/2011    HTN (hypertension) 7/20/2011    Hyperlipidemia 7/20/2011    Lumbar spinal stenosis     Mastoiditis     Memory changes 8/20/2015    PAF (paroxysmal atrial fibrillation) ([facility] [address] 7/20/2011    RBBB (right bundle branch block)     Zoster      Past Surgical History:   Procedure Laterality Date    HX APPENDECTOMY      HX OTHER SURGICAL      lung biopsy     HX OTHER SURGICAL      melanoma excision    HX TONSIL AND ADENOIDECTOMY          Diet prior to admission: regular diet, thin liquids  Current Diet:  NPO   Cognitive and Communication Status:  Neurologic State: Alert  Orientation Level: Oriented to person  Cognition: Follows commands     Perseveration: No perseveration noted     Oral Assessment:  Oral Assessment  Labial: No impairment  Dentition: Limited;Natural  Oral Hygiene: very dry  Lingual: No impairment  Mandible: No impairment  P.O. Trials:  Patient Position: up in bed  Vocal quality prior to P.O.: No impairment  Consistency Presented: Thin liquid; Solid;Puree  How Presented: SLP-fed/presented;Straw;Spoon     Bolus Acceptance: No impairment  Bolus Formation/Control: No impairment     Propulsion: No impairment  Oral Residue: None        Aspiration Signs/Symptoms: None                Oral Phase Severity: Mild  Pharyngeal Phase Severity : No impairment          Pain:  Pain Scale 1: Visual  Pain Intensity 1: 0       After treatment:   Patient left in no apparent distress in bed, Call bell within reach, Nursing notified, and Caregiver / family present    COMMUNICATION/EDUCATION:   Patient was educated regarding  purpose of SLP visit and recommendations. His son was present and verbalized understanding. The patient's plan of care including recommendations, planned interventions, and recommended diet changes were discussed with: Registered nurse. Patient is unable to participate in goal setting and plan of care.     Thank you for this referral.  NIKI Castillo  Time Calculation: 15 mins

## 2022-04-26 NOTE — PROGRESS NOTES
Problem: Self Care Deficits Care Plan (Adult)  Goal: *Acute Goals and Plan of Care (Insert Text)  Description: FUNCTIONAL STATUS PRIOR TO ADMISSION: Patient required S to maximum assistance for basic and instrumental ADLs at LTC/memory care unit, able to feed self per family report and facility staff assist with most ADLs, family reports S for walking transferring onto toilet with staff A for toileting and safety. HOME SUPPORT: The patient lived at memory care unit with support family present for evaluation providing PLOF as able. Occupational Therapy Goals  Initiated 4/26/2022  1. Patient will perform self-feeding with minimal assistance/contact guard assist within 7 day(s). 2.  Patient will perform grooming with minimal assistance/contact guard assist within 7 day(s). 3.  Patient will perform upper body dressing with minimal assistance/contact guard assist within 7 day(s). 4.  Patient will perform toilet transfers with minimal assistance/contact guard assist within 7 day(s). Outcome: Progressing Towards Goal     OCCUPATIONAL THERAPY EVALUATION  Patient: De Sacks (89 y.o. male)  Date: 4/26/2022  Primary Diagnosis: Fever [R50.9]        Precautions:   Fall,Skin,Aspiration    ASSESSMENT  Based on the objective data described below, the patient presents with impaired balance, decreased strength and activity tolerance, AMS oriented to self only, and impaired coordination with functional B UE use for ADLs, agreeable and pleasant with high guard due to confusion with difficulty reorienting, overall min A x2 for all functional mobility and transfers, able to sit EOB with impaired static balance leaning R with max A correction at times required, good righting reaction, with ability to sit without support for self feeding retraining, quick to fatigue requiring supine. Impaired coordination with functional tasks also affecting sitting balance and overall poor activity tolerance.     Wrist restraints removed then donned at end of session with family present. Will need increased A for ADLs at [address] [place] [address]. Current Level of Function Impacting Discharge (ADLs/self-care): up to total A, up to min A x2 for functional mobility with HHA x2    Functional Outcome Measure: The patient scored Total: 35/100 on the Barthel Index outcome measure which is indicative of being below baseline in basic self-care. Other factors to consider for discharge: dementia with AMS and agitation this admission     Patient will benefit from skilled therapy intervention to address the above noted impairments. PLAN :  Recommendations and Planned Interventions: self care training, functional mobility training, therapeutic exercise, balance training, therapeutic activities, endurance activities, and patient education    Frequency/Duration: Patient will be followed by occupational therapy 3 times a week to address goals. Recommendation for discharge: (in order for the patient to meet his/her long term goals)  To be determined: return to memory care unit    This discharge recommendation:  A follow-up discussion with the attending provider and/or case management is planned    IF patient discharges home will need the following DME: bedside commode       SUBJECTIVE:   Patient stated [employer] guys work for me a long time.  re: 2 sons in room    OBJECTIVE DATA SUMMARY:   HISTORY:   Past Medical History:   Diagnosis Date    Bleeding duodenal ulcer     BPH (benign prostatic hyperplasia) 10/9/2013    Cancer ([facility])     melanoma    Carpal tunnel syndrome     DJD (degenerative joint disease)     GERD (gastroesophageal reflux disease) 1/25/2012    Glucose intolerance (impaired glucose tolerance) 7/20/2011    HTN (hypertension) 7/20/2011    Hyperlipidemia 7/20/2011    Lumbar spinal stenosis     Mastoiditis     Memory changes 8/20/2015    PAF (paroxysmal atrial fibrillation) ([facility] [address] 7/20/2011    RBBB (right bundle branch block) Zoster      Past Surgical History:   Procedure Laterality Date    HX APPENDECTOMY      HX OTHER SURGICAL      lung biopsy     HX OTHER SURGICAL      melanoma excision    HX TONSIL AND ADENOIDECTOMY         Expanded or extensive additional review of patient history:     Home Situation  Home Environment: Long term care (memory care)  Support Systems: [facility]  Patient Expects to be Discharged to[de-identified] Other:    Hand dominance: Right    EXAMINATION OF PERFORMANCE DEFICITS:  Cognitive/Behavioral Status:  Neurologic State: Alert  Orientation Level: Oriented to person;Disoriented to place; Disoriented to situation;Disoriented to time  Cognition: Decreased attention/concentration;Decreased command following     Perseveration: No perseveration noted       Skin: fraile    Edema: B LE/ankles    Hearing:       Vision/Perceptual:                                     Range of Motion:  B UE stiff and rigid proximally  AROM: Generally decreased, functional  PROM: Generally decreased, functional                      Strength:  B UE  Strength: Generally decreased, functional                Coordination:     Fine Motor Skills-Upper: Left Impaired;Right Impaired         Tone & Sensation:  B UE intact                            Balance:  Sitting: Impaired; Without support; Intact; With support  Sitting - Static: Good (unsupported)  Sitting - Dynamic: Good (unsupported)  Standing: Impaired; With support  Standing - Static: Constant support; Fair  Standing - Dynamic : Fair;Constant support    Functional Mobility and Transfers for ADLs:  Bed Mobility:  Rolling: Contact guard assistance  Supine to Sit: Minimum assistance  Sit to Supine: Contact guard assistance  Scooting: Maximum assistance;Assist x2    Transfers:  Sit to Stand: Minimum assistance  Stand to Sit: Contact guard assistance  Bed to Chair: Minimum assistance; Additional time  Toilet Transfer : Minimum assistance; Additional time (BSC only)  Assistive Device :  (HHA)    ADL Assessment:  Feeding: Maximum assistance ([place] at times, dysmetria noted)    Oral Facial Hygiene/Grooming: Total assistance    Bathing: Total assistance    Upper Body Dressing: Total assistance    Lower Body Dressing: Total assistance    Toileting: Total assistance         Completed OT evaluation and ADLs seated EOB and standing as able with HHA x2 for balance. Educated on safety and endurance training with encouragement for full participation in ADLs while in hospital. Good understanding noted. ADL Intervention and task modifications:  Feeding  Food to Mouth: Maximum assistance; Compensatory technique training (max VCs, proximal elbow support, dysmetria)  Drink to Mouth: Moderate assistance  Cues: Visual/perceptual training/retraining;Verbal cues provided;Visual cues provided; Tactile cues provided;Physical assistance    Grooming  Washing Face: Total assistance (dependent) ([place], only able to hold on face, impaired coordinatino)       Patient instructed and indicated understanding the benefits of maintaining activity tolerance, functional mobility, and independence with self care tasks during acute stay  to ensure safe return home and to baseline. Encouraged patient to increase frequency and duration OOB, be out of bed for all meals, perform daily ADLs (as approved by RN/MD regarding bathing etc), and performing functional mobility to/from bathroom. Lower Body Dressing Assistance  Socks: Total assistance (dependent)              Therapeutic Exercise:     Functional Measure:    Barthel Index:  Bathin  Bladder: 0  Bowels: 5  Groomin  Dressin  Feedin  Mobility: 10  Stairs: 5  Toilet Use: 5  Transfer (Bed to Chair and Back): 10  Total: 35/100      The Barthel ADL Index: Guidelines  1. The index should be used as a record of what a patient does, not as a record of what a patient could do.   2. The main aim is to establish degree of independence from any help, physical or verbal, however minor and for whatever reason. 3. The need for supervision renders the patient not independent. 4. A patient's performance should be established using the best available evidence. Asking the patient, friends/relatives and nurses are the usual sources, but direct observation and common sense are also important. However direct testing is not needed. 5. Usually the patient's performance over the preceding 24-48 hours is important, but occasionally longer periods will be relevant. 6. Middle categories imply that the patient supplies over 50 per cent of the effort. 7. Use of aids to be independent is allowed. Score Interpretation (from [address] [facility] [address])    Independent   60-79 Minimally independent   40-59 Partially dependent   20-39 Very dependent   <20 Totally dependent     -Seb Spicer., Barthel, DTemoW. (1965). Functional evaluation: the Barthel Index. [address] [facility] ([address] (1997). The Barthel activities of daily living index: self-reporting versus actual performance in the old (> or = 75 years). Journal [address] 45(7), [address] [facility], ..., Ida Foster., Ginna Damico. (1999). Measuring the change in disability after inpatient rehabilitation; comparison of the responsiveness of the Barthel Index and Functional [place] Measure. Journal of Neurology, Neurosurgery, and Psychiatry, 66(4), 484-724. KELVIN Carson, ELIF Mccrary, & Alejandra Gold, M.A. (2004) Assessment of post-stroke quality of life in cost-effectiveness studies: The usefulness of the Barthel Index and the EuroQoL-5D.  Quality of Life Research, 15, 478-99     Occupational Therapy Evaluation Charge Determination   History Examination Decision-Making   LOW Complexity : Brief history review  HIGH Complexity : 5 or more performance deficits relating to physical, cognitive , or psychosocial skils that result in activity limitations and / or participation restrictions MEDIUM Complexity : Patient may present with comorbidities that affect occupational performnce. Miniml to moderate modification of tasks or assistance (eg, physical or verbal ) with assesment(s) is necessary to enable patient to complete evaluation       Based on the above components, the patient evaluation is determined to be of the following complexity level: LOW   Pain Rating:  None noted    Activity Tolerance:   Poor, requires frequent rest breaks, and observed SOB with activity    After treatment patient left in no apparent distress:    Supine in bed, Call bell within reach, Bed / chair alarm activated, Caregiver / family present, Side rails x 3, and Restraints    COMMUNICATION/EDUCATION:   The patients plan of care was discussed with: Physical therapist and Registered nurse. Home safety education was provided and the patient/caregiver indicated understanding., Patient/family have participated as able in goal setting and plan of care. , and Patient/family agree to work toward stated goals and plan of care. This patients plan of care is appropriate for delegation to [facility].     Thank you for this referral.  Aurora Calderon, DANILO  Time Calculation: 30 mins

## 2022-04-26 NOTE — H&P
[facility]  HISTORY AND PHYSICAL    Name:  Kallie Alvarado  MR#:  [id number]  :  1933  ACCOUNT #:  [de-identified]  ADMIT DATE:  2022      PRIMARY CARE PROVIDER:  Cleo Meraz MD    SOURCE OF INFORMATION:  His son and reviewing his medical record. CHIEF COMPLAINT:  Altered mental status since this morning. HISTORY OF PRESENT ILLNESS:  This is an 70-year-old  male with past medical history significant for memory impairment, paroxysmal AFib, hypertension, dyslipidemia, right bundle branch block, lumbar spinal stenosis, gastroesophageal reflux disease, and BPH, who is transferred from the nursing home after he became altered with agitation and restless. He had also fever of 103. It was unable to get detailed information from him. His son was at the bedside. He stated that the last time he saw him was on 2022. At that time, he was relatively stable but he had on and off confusion, agitation and restlessness then worse for the last couple of days. He said his Zoloft dose recently reduced. No cough. Patient denies cough, left-sided chest pain, nausea, vomiting, or abdominal pain. The patient was restless, agitated, and combative in ER, Haldol IV 2 mg was given, placed on restraints and his son was in the room. His vital signs were blood pressure of 130/96, pulse 118, temperature 102.1, saturation of oxygen 100% on room air. CT scan of the head was done and no acute intracranial findings. .  Chest x-ray, trace bilateral pleural effusion and mild patchy bibasilar airspace opacities which may represent atelectasis, and the patient referred to hospitalist service for further evaluation and admission. REVIEW OF SYSTEMS:  Pertinent positive finding mentioned in the HPI, but detailed information was unable to obtain from the patient. PAST MEDICAL HISTORY:  1. Hypertension. 2.  Memory impairment. 3.  Paroxysmal AFib. 4.  Right bundle branch block.   5. Lumbar spinal stenosis. 6.  Hyperlipidemia. 7.  Gastroesophageal reflux disease. 8.  BPH. MEDICATIONS:  Prior to admission medications include:  1. Namenda 10 mg p.o. b.i.d.  2.  Senna 8.6 mg p.o. every 6 hours as needed. 3.  Tylenol 650 every 6 hours as needed. 4.  Vitamin A and D two times daily. 5.  Zoloft 50 mg p.o. daily. 6.  Aricept 10 mg p.o. daily. 7.  Zocor 5 mg p.o. daily. ALLERGIES:  1. LIPITOR. 2.  PENICILLIN. 3.  PRAVASTATIN. SOCIAL HISTORY:  The patient from nursing home. Ambulates independently. No tobacco or alcohol abuse. Code status, DNR. FAMILY HISTORY:  Mother had a history of diabetes and heart disease. Father, history of diabetes, heart disease, and cancer. PHYSICAL EXAMINATION:  VITAL SIGNS:  Blood pressure was 105/62, pulse 59, temperature 99.6, respiratory rate 17, saturation of oxygen 98% on room air, and BMI 23.74 kg/m2. GENERAL APPEARANCE:  The patient is alert, restless, cooperative, not in acute cardiorespiratory distress. HEENT:  Pink conjunctivae and anicteric sclerae. Moist tongue and buccal mucosa. LUNGS:  Decreased bronchial breath sounds to auscultation bilaterally. CHEST WALL:  No tenderness or deformity. CARDIOVASCULAR:  Regular rate and rhythm. S1 and S2 normal.  No murmur or gallop. ABDOMEN:  Obese, soft, nontender. Bowel sounds normal.  No mass or organomegaly. EXTREMITIES:  No cyanosis or edema. SKIN:  No rash or lesion. GENITOURINARY TRACT SYSTEM:  His prepuce or foreskin meatus stenosis. CENTRAL NERVOUS SYSTEM:  The patient is conscious and alert but disoriented, agitated, restless. Moves all extremities. DIAGNOSTIC DATA:  EKG, sinus rhythm, ventricular rate 71 beats per minute, nonspecific right bundle branch block, nonspecific ST-T wave. CT of the head, no acute intracranial finding. Chest x-ray, trace bilateral pleural effusion and mild patchy bibasilar airspace opacities which may represent atelectasis.     LABORATORY DATA:  Hematology, white blood cell count 8.6, hemoglobin 14.7, hematocrit 45.4, MCV 92.3, platelet count 418. Urinalysis, white blood cell count 20-50, bacteria 3+, leukocyte esterase large, nitrites negative. Chemistry, sodium 135, potassium 4.2, chloride 102, CO2 of 28, anion gap 5, glucose 94, BUN 21, creatinine 1.21, BUN/creatinine ratio 17, calcium 8.3. Total bilirubin 0.7, total protein 7.3, albumin 3.8, ALT 30, AST 38, alkaline phosphatase 128. Troponin 50. ASSESSMENT:  1. Acute metabolic encephalopathy. 2.  Urinary tract infection with possible sepsis. 3.  Possible pneumonia. 4.  Hypertension. 5.  Paroxysmal atrial fibrillation. 6.  Prepuce or foreskin meatus stricture. 7.  History of benign prostatic hyperplasia. 8.  History of dementia with agitation. 9.  Dyslipidemia. PLAN:  1. Acute metabolic encephalopathy, likely due to UTI and pneumonia. Admit the patient to telemetry floor. CT scan negative for acute intracranial process. His agitation and combativeness likely due to urinary tract infection. ill give him IV Levaquin. The patient is allergic for penicillin.  P.r.n. Haldol. IV fluids. P.r.n. Tylenol. Continue neuro check, Haldol p.r.n., restraints, and sitter as needed and continue supportive care and Seroquel 25 mg p.o. at bedtime. 2.  Urinary tract infection with possible sepsis. The patient is allergic to penicillin. will give him Levaquin, IV fluids and follow up urine culture and blood culture. 3.  Possible pneumonia. Continue Levaquin, oxygen support, and follow up on blood culture. 4.  Hypertension. Blood pressure normal.  Continue home medication and monitor blood pressure. 5.  Paroxysmal AFib, rate controlled. Continue home medication, diltiazem and we will add aspirin. 6.  Foreskin or prepuce meatus stricture. The patient with history of BPH. Consult urologist.  It was difficult to catheterize and monitor and bladder scan.   7.  Dementia with agitation and restlessness. Continue home medications Aricept, Namenda, Zoloft, Seroquel 25 at bedtime, p.r.n. Haldol. The son mentioned that recently his Zoloft cut down and he is concerned whether it was related to reduced Zoloft dose, and explained to him that his fever with altered mental status likely due to urinary tract infection. will continue supportive care. 8.  Dyslipidemia. Continue home statin. DVT prophylaxis, heparin. FUNCTIONAL STATUS PRIOR TO ADMISSION:  Ambulates independently.         MD BEAN Mallory/[email or web address]  D:  04/25/2022 17:57  T:  04/25/2022 23:42  JOB #:  [id number]

## 2022-04-26 NOTE — PROGRESS NOTES
Problem: Mobility Impaired (Adult and Pediatric)  Goal: *Acute Goals and Plan of Care (Insert Text)  Description: FUNCTIONAL STATUS PRIOR TO ADMISSION: Pt poor historian, per conversation with sons, pt resident of memory care and was I with ambulation without AD PTA. Pt has hx of falls per reports from staff. HOME SUPPORT PRIOR TO ADMISSION: Per conversation with sons, pt required staff to assist with ADLs however the level of support is unclear. Physical Therapy Goals  Initiated 4/26/2022  1. Patient will move from supine to sit and sit to supine  in bed with modified independence within 7 day(s). 2.  Patient will transfer from bed to chair and chair to bed with modified independence using the least restrictive device within 7 day(s). 3.  Patient will perform sit to stand with modified independence within 7 day(s). 4.  Patient will ambulate with modified independence for 200 feet with the least restrictive device within 7 day(s). Outcome: Not Met     PHYSICAL THERAPY EVALUATION  Patient: Nadia Shaw (82 y.o. male)  Date: 4/26/2022  Primary Diagnosis: Fever [R50.9]        Precautions:  Fall,Skin,Aspiration      ASSESSMENT  Based on the objective data described below, the patient presents with decreased command following, awareness, balance, functional mobility, activity tolerance. Pt received supine in bed, in restraints, sitter and two sons present and agreeable to work with PT. Pt poor historian and PLOF and living situation provided by anita. Pt generally mobilized at a CGA to Min A level during session. Pt initially came supine>sit at EOB and demonstrated strong posterior lean which was only minimally corrected with VC/TC. Pt attempted to don hospital sock however was unable to maintain sitting balance demonstrating increased posterior lean and eventually PT donned socks for pt. Pt required Min A to successfully complete sit>stand transfer then ambulated in room with bilateral HHA.  Upon returning to bed, pt spent minute with forward flexed at hip straightening bed linens with single UE support on bed before transferring stand>sit and sat EOB ~ 5 minutes while OT worked with PT on eating with utensil. Pt initially displayed posterior trunk lean however if utensil presented outside of pts reach, pt would self correct trunk lean in order to grasp utensil and maintain stable sitting posture. Eventually pt returned to supine and session concluded. Current Level of Function Impacting Discharge (mobility/balance): Min A supine>sit and sit>stand, CGA for all other mobility    Functional Outcome Measure: The patient scored Total: 55/100 on the Barthel Index outcome measure which is indicative of being partially dependent in basic self-care. Other factors to consider for discharge: admitted from memory care, requiring sitter and restraints, UTI, hx falls     Patient will benefit from skilled therapy intervention to address the above noted impairments. PLAN :  Recommendations and Planned Interventions: bed mobility training, transfer training, gait training, therapeutic exercises, neuromuscular re-education, patient and family training/education, and therapeutic activities      Frequency/Duration: Patient will be followed by physical therapy:  4 times a week to address goals.     Recommendation for discharge: (in order for the patient to meet his/her long term goals)  Return to memory care    This discharge recommendation:  Has been made in collaboration with the attending provider and/or case management    IF patient discharges home will need the following DME: TBD however likely none         SUBJECTIVE:   Patient stated that's enough ice cream.    OBJECTIVE DATA SUMMARY:   HISTORY:    Past Medical History:   Diagnosis Date    Bleeding duodenal ulcer     BPH (benign prostatic hyperplasia) 10/9/2013    Cancer ([facility] [address]     melanoma    Carpal tunnel syndrome     DJD (degenerative joint disease)     GERD (gastroesophageal reflux disease) 1/25/2012    Glucose intolerance (impaired glucose tolerance) 7/20/2011    HTN (hypertension) 7/20/2011    Hyperlipidemia 7/20/2011    Lumbar spinal stenosis     Mastoiditis     Memory changes 8/20/2015    PAF (paroxysmal atrial fibrillation) ([facility] [address] 7/20/2011    RBBB (right bundle branch block)     Zoster      Past Surgical History:   Procedure Laterality Date    HX APPENDECTOMY      HX OTHER SURGICAL      lung biopsy     HX OTHER SURGICAL      melanoma excision    HX TONSIL AND ADENOIDECTOMY         Personal factors and/or comorbidities impacting plan of care: HTN, PAF    Home Situation  Home Environment: Long term care (memory care)  Support Systems: [facility]  Patient Expects to be Discharged to<[facility]> Other:    EXAMINATION/PRESENTATION/DECISION MAKING:   Critical Behavior:  Neurologic State: Alert  Orientation Level: Oriented to person,Disoriented to place,Disoriented to situation,Disoriented to time  Cognition: Decreased attention/concentration,Decreased command following    Range Of Motion:  AROM: Generally decreased, functional           PROM: Generally decreased, functional           Strength:    Strength: Generally decreased, functional       Functional Mobility:  Bed Mobility:  Rolling: Contact guard assistance  Supine to Sit: Minimum assistance  Sit to Supine: Contact guard assistance  Scooting: Maximum assistance;Assist x2  Transfers:  Sit to Stand: Minimum assistance  Stand to Sit: Contact guard assistance                       Balance:   Sitting: Impaired; Without support; Intact; With support  Sitting - Static: Good (unsupported)  Sitting - Dynamic: Good (unsupported)  Standing: Impaired; With support  Standing - Static: Constant support; Fair  Standing - Dynamic : Fair;Constant support  Ambulation/Gait Training:  Distance (ft): 30 Feet (ft)  Assistive Device: Gait belt  Ambulation - Level of Assistance: Minimal assistance;Assist x2 (bilateral HHA)        Gait Abnormalities: Lurching;Path deviations; Shuffling gait;Trunk sway increased        Base of Support: Widened;Center of gravity altered  Stance: Weight shift  Speed/Marjorie: Fluctuations  Step Length: Left shortened;Right shortened  Swing Pattern: Right asymmetrical;Left asymmetrical         Functional Measure:  Barthel Index:    Bathin  Bladder: 5  Bowels: 5  Groomin  Dressin  Feedin  Mobility: 10  Stairs: 5  Toilet Use: 5  Transfer (Bed to Chair and Back): 10  Total: 55/100       The Barthel ADL Index: Guidelines  1. The index should be used as a record of what a patient does, not as a record of what a patient could do. 2. The main aim is to establish degree of independence from any help, physical or verbal, however minor and for whatever reason. 3. The need for supervision renders the patient not independent. 4. A patient's performance should be established using the best available evidence. Asking the patient, friends/relatives and nurses are the usual sources, but direct observation and common sense are also important. However direct testing is not needed. 5. Usually the patient's performance over the preceding 24-48 hours is important, but occasionally longer periods will be relevant. 6. Middle categories imply that the patient supplies over 50 per cent of the effort. 7. Use of aids to be independent is allowed. Score Interpretation (from [address] [place] [address])    Independent   60-79 Minimally independent   40-59 Partially dependent   20-39 Very dependent   <20 Totally dependent     -Seb Spicer., Barthel, D.W. (1965). Functional evaluation: the Barthel Index. [address] [facility] ([address] (1997). The Barthel activities of daily living index: self-reporting versus actual performance in the old (> or = 75 years). Journal of [address] [place] [address] 45(7), [facility], J.J.M.F, Melo Asencio., Malaika Ingram. (1999). Measuring the change in disability after inpatient rehabilitation; comparison of the responsiveness of the Barthel Index and Functional [place] Measure. Journal of Neurology, Neurosurgery, and Psychiatry, 66(4), 333-509. CARLITOS Boswell.BARBARA, ELIF Mccrary, & Reba Monreal M.A. (2004) Assessment of post-stroke quality of life in cost-effectiveness studies: The usefulness of the Barthel Index and the EuroQoL-5D. Quality of Life Research, 15, 944-04        Physical Therapy Evaluation Charge Determination   History Examination Presentation Decision-Making   HIGH Complexity :3+ comorbidities / personal factors will impact the outcome/ POC  MEDIUM Complexity : 3 Standardized tests and measures addressing body structure, function, activity limitation and / or participation in recreation  LOW Complexity : Stable, uncomplicated  Other outcome measures barthel  MEDIUM      Based on the above components, the patient evaluation is determined to be of the following complexity level: LOW     Pain Rating:  Pt did not relate pain during session    Activity Tolerance:   Good, tolerates ADLs without rest breaks, and SpO2 stable on RA    After treatment patient left in no apparent distress:   Supine in bed, Call bell within reach, Caregiver / family present, Side rails x 3, Restraints, and sitter present    COMMUNICATION/EDUCATION:   The patients plan of care was discussed with: Occupational therapist and Registered nurse. Fall prevention education was provided and the patient/caregiver indicated understanding., Patient/family have participated as able in goal setting and plan of care. , and Patient/family agree to work toward stated goals and plan of care.     Thank you for this referral.  Roseline Nina, PT   Time Calculation: 24 mins

## 2022-04-27 LAB
ALBUMIN SERPL-MCNC: 3 G/DL (ref 3.5–5)
ALBUMIN/GLOB SERPL: 1 {RATIO} (ref 1.1–2.2)
ALP SERPL-CCNC: 105 U/L (ref 45–117)
ALT SERPL-CCNC: 32 U/L (ref 12–78)
ANION GAP SERPL CALC-SCNC: 6 MMOL/L (ref 5–15)
AST SERPL-CCNC: 65 U/L (ref 15–37)
BACTERIA SPEC CULT: ABNORMAL
BILIRUB SERPL-MCNC: 0.3 MG/DL (ref 0.2–1)
BUN SERPL-MCNC: 19 MG/DL (ref 6–20)
BUN/CREAT SERPL: 22 (ref 12–20)
CALCIUM SERPL-MCNC: 8.2 MG/DL (ref 8.5–10.1)
CC UR VC: ABNORMAL
CHLORIDE SERPL-SCNC: 107 MMOL/L (ref 97–108)
CO2 SERPL-SCNC: 27 MMOL/L (ref 21–32)
CREAT SERPL-MCNC: 0.88 MG/DL (ref 0.7–1.3)
ERYTHROCYTE [DISTWIDTH] IN BLOOD BY AUTOMATED COUNT: 13.7 % (ref 11.5–14.5)
GLOBULIN SER CALC-MCNC: 3.1 G/DL (ref 2–4)
GLUCOSE SERPL-MCNC: 104 MG/DL (ref 65–100)
HCT VFR BLD AUTO: 42.6 % (ref 36.6–50.3)
HGB BLD-MCNC: 14.1 G/DL (ref 12.1–17)
INFLUENZA A AB, IGG 828524: 2.65 IV
INFLUENZA A VIRUS IGM: 1.79 IV
INFLUENZA B VIRUS AB, IGG: 1.62 IV
INFLUENZA B VIRUS AB, IGM: 0.91 IV
MAGNESIUM SERPL-MCNC: 2.3 MG/DL (ref 1.6–2.4)
MCH RBC QN AUTO: 30.3 PG (ref 26–34)
MCHC RBC AUTO-ENTMCNC: 33.1 G/DL (ref 30–36.5)
MCV RBC AUTO: 91.4 FL (ref 80–99)
NRBC # BLD: 0 K/UL (ref 0–0.01)
NRBC BLD-RTO: 0 PER 100 WBC
PLATELET # BLD AUTO: 183 K/UL (ref 150–400)
PMV BLD AUTO: 10.2 FL (ref 8.9–12.9)
POTASSIUM SERPL-SCNC: 3.8 MMOL/L (ref 3.5–5.1)
PROT SERPL-MCNC: 6.1 G/DL (ref 6.4–8.2)
RBC # BLD AUTO: 4.66 M/UL (ref 4.1–5.7)
SERVICE CMNT-IMP: ABNORMAL
SODIUM SERPL-SCNC: 140 MMOL/L (ref 136–145)
WBC # BLD AUTO: 4.9 K/UL (ref 4.1–11.1)

## 2022-04-27 PROCEDURE — 65270000046 HC RM TELEMETRY

## 2022-04-27 PROCEDURE — 94760 N-INVAS EAR/PLS OXIMETRY 1: CPT

## 2022-04-27 PROCEDURE — 74011250636 HC RX REV CODE- 250/636: Performed by: HOSPITALIST

## 2022-04-27 PROCEDURE — 74011250637 HC RX REV CODE- 250/637: Performed by: HOSPITALIST

## 2022-04-27 PROCEDURE — 83735 ASSAY OF MAGNESIUM: CPT

## 2022-04-27 PROCEDURE — 97530 THERAPEUTIC ACTIVITIES: CPT

## 2022-04-27 PROCEDURE — 74011000250 HC RX REV CODE- 250: Performed by: HOSPITALIST

## 2022-04-27 PROCEDURE — 80053 COMPREHEN METABOLIC PANEL: CPT

## 2022-04-27 PROCEDURE — 36415 COLL VENOUS BLD VENIPUNCTURE: CPT

## 2022-04-27 PROCEDURE — 85027 COMPLETE CBC AUTOMATED: CPT

## 2022-04-27 RX ORDER — OSELTAMIVIR PHOSPHATE 75 MG/1
75 CAPSULE ORAL EVERY 12 HOURS
Status: DISCONTINUED | OUTPATIENT
Start: 2022-04-27 | End: 2022-04-29 | Stop reason: HOSPADM

## 2022-04-27 RX ORDER — LORAZEPAM 2 MG/ML
1 INJECTION INTRAMUSCULAR
Status: DISCONTINUED | OUTPATIENT
Start: 2022-04-27 | End: 2022-04-29 | Stop reason: HOSPADM

## 2022-04-27 RX ORDER — LEVOFLOXACIN 5 MG/ML
750 INJECTION, SOLUTION INTRAVENOUS EVERY 24 HOURS
Status: DISCONTINUED | OUTPATIENT
Start: 2022-04-27 | End: 2022-04-28 | Stop reason: CLARIF

## 2022-04-27 RX ADMIN — SERTRALINE 25 MG: 50 TABLET, FILM COATED ORAL at 09:49

## 2022-04-27 RX ADMIN — DEXTROSE AND SODIUM CHLORIDE 75 ML/HR: 5; 450 INJECTION, SOLUTION INTRAVENOUS at 00:20

## 2022-04-27 RX ADMIN — HEPARIN SODIUM 5000 UNITS: 5000 INJECTION INTRAVENOUS; SUBCUTANEOUS at 15:02

## 2022-04-27 RX ADMIN — SODIUM CHLORIDE, PRESERVATIVE FREE 10 ML: 5 INJECTION INTRAVENOUS at 15:02

## 2022-04-27 RX ADMIN — LEVOFLOXACIN 750 MG: 5 INJECTION, SOLUTION INTRAVENOUS at 11:55

## 2022-04-27 RX ADMIN — MEMANTINE HYDROCHLORIDE 10 MG: 10 TABLET ORAL at 09:49

## 2022-04-27 RX ADMIN — OSELTAMIVIR PHOSPHATE 75 MG: 75 CAPSULE ORAL at 11:56

## 2022-04-27 RX ADMIN — DEXTROSE AND SODIUM CHLORIDE 75 ML/HR: 5; 450 INJECTION, SOLUTION INTRAVENOUS at 21:13

## 2022-04-27 RX ADMIN — OSELTAMIVIR PHOSPHATE 75 MG: 75 CAPSULE ORAL at 21:20

## 2022-04-27 RX ADMIN — HEPARIN SODIUM 5000 UNITS: 5000 INJECTION INTRAVENOUS; SUBCUTANEOUS at 06:41

## 2022-04-27 RX ADMIN — QUETIAPINE FUMARATE 50 MG: 100 TABLET ORAL at 21:20

## 2022-04-27 RX ADMIN — ASPIRIN 81 MG 81 MG: 81 TABLET ORAL at 09:49

## 2022-04-27 RX ADMIN — SODIUM CHLORIDE, PRESERVATIVE FREE 10 ML: 5 INJECTION INTRAVENOUS at 06:26

## 2022-04-27 RX ADMIN — HEPARIN SODIUM 5000 UNITS: 5000 INJECTION INTRAVENOUS; SUBCUTANEOUS at 21:20

## 2022-04-27 NOTE — PROGRESS NOTES
Clinical Pharmacy Note: Antibiotic Renal Dosing    Medication: Levaquin 750 mg Q48H  Indication:  CAP, UTI    Recent Labs     22  0407 22  0723   WBC 4.9 8.6   CREA 0.88 1.21   BUN 19 21*     Temp (24hrs), Av.2 °F (36.8 °C), Min:97.7 °F (36.5 °C), Max:98.8 °F (37.1 °C)    Body mass index is 23.74 kg/m². Estimated Creatinine Clearance: 63.7 mL/min (based on SCr of 0.88 mg/dL). Plan: Change to 750 mg Q24H per [facility] P&T approved dosing protocol.

## 2022-04-27 NOTE — PROGRESS NOTES
Chart reviewed, cleared by RN for PT treatment. Upon entering room pt observed supine in bed, snoring, not responding to voice commands. Will f/u as able and appropriate.     Diya Laura PT, DPT

## 2022-04-27 NOTE — PROGRESS NOTES
Problem: Mobility Impaired (Adult and Pediatric)  Goal: *Acute Goals and Plan of Care (Insert Text)  Description: FUNCTIONAL STATUS PRIOR TO ADMISSION: Pt poor historian, per conversation with sons, pt resident of memory care and was I with ambulation without AD PTA. Pt has hx of falls per reports from staff. HOME SUPPORT PRIOR TO ADMISSION: Per conversation with sons, pt required staff to assist with ADLs however the level of support is unclear. Physical Therapy Goals  Initiated 4/26/2022  1. Patient will move from supine to sit and sit to supine  in bed with modified independence within 7 day(s). 2.  Patient will transfer from bed to chair and chair to bed with modified independence using the least restrictive device within 7 day(s). 3.  Patient will perform sit to stand with modified independence within 7 day(s). 4.  Patient will ambulate with modified independence for 200 feet with the least restrictive device within 7 day(s). Outcome: Progressing Towards Goal     PHYSICAL THERAPY TREATMENT  Patient: Iftikhar Nixon (64 y.o. male)  Date: 4/27/2022  Diagnosis: Fever [R50.9] <principal problem not specified>       Precautions: Fall,Skin,Aspiration  Chart, physical therapy assessment, plan of care and goals were reviewed. ASSESSMENT  Patient continues with skilled PT services and is progressing towards goals. Pt generally mobilized at a CGA to Min A level during session. Pt received supine in bed, drowsy, in restraints but agreeable to participate in therapy with family in room. Pt vitals taken, unremarkable, not recorded. Pt able to come sitting EOB with good command following however often requires redirection. Pt then sat EOB for ~ 5 minutes however required multiple VC to maintain sitting posture with feet on floor. Pt then performed sit>stand with RW, gait belt, and Min A and was able to stand with CGA ~2 minutes while brief changed. Pt required VC for proper hand placement on RW.  Declined ambulation this session secondary to fatigued and decreased alertness. Pt then assisted back to supine in bed and positioned for comfort before session concluded. Current Level of Function Impacting Discharge (mobility/balance): Min A for supine>sit, sit>stand, CGA for all other mobility, limited ambulation    Other factors to consider for discharge: UTI, altered mental state         PLAN :  Patient continues to benefit from skilled intervention to address the above impairments. Continue treatment per established plan of care. to address goals. Recommendation for discharge: (in order for the patient to meet his/her long term goals)  Return to memory care    This discharge recommendation:  Has been made in collaboration with the attending provider and/or case management    IF patient discharges home will need the following DME: to be determined (TBD)       SUBJECTIVE:   Patient stated thanks.     OBJECTIVE DATA SUMMARY:   Critical Behavior:  Neurologic State: Alert,Confused  Orientation Level: Disoriented to place,Disoriented to situation,Disoriented to time,Oriented to person  Cognition: Follows commands     Functional Mobility Training:  Bed Mobility:  Rolling: Contact guard assistance  Supine to Sit: Minimum assistance  Sit to Supine: Contact guard assistance  Scooting: Minimum assistance        Transfers:  Sit to Stand: Minimum assistance  Stand to Sit: Contact guard assistance                             Balance:  Sitting: Intact; With support  Sitting - Static: Good (unsupported)  Sitting - Dynamic: Fair (occasional)  Standing: Impaired; With support  Standing - Static: Fair;Constant support  Standing - Dynamic : Fair;Constant support    Pain Rating:  Pt did not relate pain during session    Activity Tolerance:   Fair, tolerates ADLs without rest breaks, and SpO2 stable on RA    After treatment patient left in no apparent distress:   Supine in bed, Heels elevated for pressure relief, Call bell within reach, Caregiver / family present, Side rails x 3, and Restraints    COMMUNICATION/COLLABORATION:   The patients plan of care was discussed with: Registered nurse.      Juana Olivares, PT   Time Calculation: 27 mins

## 2022-04-27 NOTE — PROGRESS NOTES
SLP Contact Note    Pt tolerating diet without adverse effects nor overt difficulty. No further SLP needs. Will sign off.       Thank you,  YARELIS HoskinsEd, [id number] [facility]  Speech-Language Pathologist

## 2022-04-27 NOTE — PROGRESS NOTES
Transition of Care Plan  · RUR- 11% Moderate Risk  · DISPOSITION: The disposition plan is pending medical progression and recommendation. · F/U with PCP/Specialist    · Transport: [facility] (memory care unit)  [address], [address] [facility]  Phone Number: [phone]  SW: Tesha Docker: [phone]    At 11:50am - CM received a call from [phone] at facility who requested update. Per this morning's IDR's patient to likely discharge back to facility tomorrow pending medical stability.     Valencia Rivera, WILL, CRM, LMHP-e  Available in Perfect Serve

## 2022-04-27 NOTE — PROGRESS NOTES
[address] [facility] Adult  Hospitalist Group                                                                                          Hospitalist Progress Note  Princess Shakila MD  Answering service: [phone] from in house phone        Date of Service:  2022  NAME:  Merari Gomez  :  1933  MRN:  [id number]      Admission Summary: This is an 55-year-old  male with past medical history significant for memory impairment, paroxysmal AFib, hypertension, dyslipidemia, right bundle branch block, lumbar spinal stenosis, gastroesophageal reflux disease, and BPH, who is transferred from the nursing home after he became altered with agitation and restless. He had also fever of 103. It was unable to get detailed information from him. His son was at the bedside. He stated that the last time he saw him was on 2022. At that time, he was relatively stable but he had on and off confusion, agitation and restlessness then worse for the last couple of days. He said his Zoloft dose recently reduced. No cough. Patient denies cough, left-sided chest pain, nausea, vomiting, or abdominal pain. The patient was restless, agitated, and combative in ER, Haldol IV 2 mg was given, placed on restraints and his son was in the room. His vital signs were blood pressure of 130/96, pulse 118, temperature 102.1, saturation of oxygen 100% on room air. CT scan of the head was done and no acute intracranial findings. .  Chest x-ray, trace bilateral pleural effusion and mild patchy bibasilar airspace opacities which may represent atelectasis, and the patient referred to hospitalist service for further evaluation and admission. Interval history / Subjective:     Patient conscious and alert, calm today, working with PT    His sons [employer] in the room, updated the clinical finding, care plan, and answered questions.      Assessment & Plan:     Delirium, Acute metabolic encephalopathy with underlying dementia  -CT head no acute intracranial pathology   -patient is conscious and alert, disoriented, restless and agitated  -continue seroquel 50 mg q hs, prn haldol and ativan  -EKG normal sinus rhythm vent rate 71 bpm non specific st t wave  -continue soft restraint  -continue neuro check and supportive care   -seen by psychiatrist   -continue sitter . UTI with sepsis POA  -fever, tachycardia, change in mental status   -continue IV levaquin, IVF  -urine culture grow proteus Mirabilis, sensitive to levaquin  -blood cx no growth so far  -patient allergic to PCN    Possible pneumonia, Influenza A infection  -on iv levaquin, Tamiflu and IVF  -chest x ray trace bilateral pleural effusions and mild patchy bibasilar airspace opacities which may represent atelectasis. -continue levaquin and IVF  -afebrile no leukocytosis  -SpO2 90-94% on RA    HTN  -BP BP normal, monitor BP    Hx of Paroxysmal atrial fibrillation, now NSR  -continue aspirin  -not on BB or CCB    Prepuce or foreskin meatus stricture.  -urologist is consulted    Hx of benign prostatic hyperplasia. -stable    Hx of dementia with agitation.  -on aricept, namenda, zoloft and  seroquel    Dyslipidemia.  -can resume zocor on discharge           Code status: DNR  Prophylaxis: Heparin  Care Plan discussed with: Patient, his son at bed side, nurse and CM  Anticipated Disposition: [facility] care unit >48 hrs     Hospital Problems  Date Reviewed: 1/31/2017          Codes Class Noted POA    Fever ICD-10-CM: R50.9  ICD-9-CM: 780.60  4/25/2022 Unknown               Vital Signs:    Last 24hrs VS reviewed since prior progress note.  Most recent are:  Visit Vitals  BP (!) (P) 105/48 (BP 1 Location: Left upper arm)   Pulse (P) 79   Temp (P) 98.8 °F (37.1 °C)   Resp (P) 18   Ht 6' (1.829 m)   Wt 79.4 kg (175 lb 0.7 oz)   SpO2 (P) 100%   BMI 23.74 kg/m²       No intake or output data in the 24 hours ending 04/27/22 1007     Physical Examination:     I had a face to face encounter with this patient and independently examined them on 4/27/2022 as outlined below:          Constitutional:  No acute distress, cooperative, pleasant    ENT:  Oral mucosa moist, oropharynx benign. Resp:  CTA bilaterally. No wheezing/rhonchi/rales. No accessory muscle use. CV:  Regular rhythm, normal rate, no murmurs, gallops, rubs    GI:  Soft, non distended, non tender. normoactive bowel sounds, no hepatosplenomegaly     Musculoskeletal:  No edema,     Neurologic:  Conscious and alert,  disoriented, moves all extremities             Data Review:    Review and/or order of clinical lab test  Review and/or order of tests in the radiology section of CPT  Review and/or order of tests in the medicine section of CPT      Labs:     Recent Labs     04/27/22 0407 04/25/22  0723   WBC 4.9 8.6   HGB 14.1 14.7   HCT 42.6 45.4    216     Recent Labs     04/27/22 0407 04/25/22  0723    135*   K 3.8 4.2    102   CO2 27 28   BUN 19 21*   CREA 0.88 1.21   * 94   CA 8.2* 8.3*   MG 2.3  --      Recent Labs     04/27/22 0407 04/25/22  0723   ALT 32 30    128*   TBILI 0.3 0.7   TP 6.1* 7.3   ALB 3.0* 3.8   GLOB 3.1 3.5     No results for input(s): INR, PTP, APTT, INREXT, INREXT in the last 72 hours. No results for input(s): FE, TIBC, PSAT, FERR in the last 72 hours. No results found for: FOL, RBCF   No results for input(s): PH, PCO2, PO2 in the last 72 hours. No results for input(s): CPK, CKNDX, TROIQ in the last 72 hours.     No lab exists for component: CPKMB  No results found for: CHOL, CHOLX, CHLST, CHOLV, HDL, HDLP, LDL, LDLC, DLDLP, TGLX, TRIGL, TRIGP, CHHD, CHHDX  Lab Results   Component Value Date/Time    Glucose (POC) 109 04/26/2022 11:02 AM    Glucose (POC) 93 04/25/2022 07:22 AM    Glucose  (A) 10/09/2013 10:42 AM    Glucose  (A) 06/04/2013 10:16 AM    Glucose  (A) 09/26/2012 09:05 AM     Lab Results   Component Value Date/Time    Color YELLOW/STRAW 04/25/2022 09:11 AM    Appearance TURBID (A) 04/25/2022 09:11 AM    Specific gravity 1.018 04/25/2022 09:11 AM    pH (UA) 7.5 04/25/2022 09:11 AM    Protein 100 (A) 04/25/2022 09:11 AM    Glucose Negative 04/25/2022 09:11 AM    Ketone TRACE (A) 04/25/2022 09:11 AM    Bilirubin Negative 04/25/2022 09:11 AM    Urobilinogen 1.0 04/25/2022 09:11 AM    Nitrites Negative 04/25/2022 09:11 AM    Leukocyte Esterase LARGE (A) 04/25/2022 09:11 AM    Epithelial cells FEW 04/25/2022 09:11 AM    Bacteria 3+ (A) 04/25/2022 09:11 AM    WBC 20-50 04/25/2022 09:11 AM    RBC 0-5 04/25/2022 09:11 AM         Medications Reviewed:     Current Facility-Administered Medications   Medication Dose Route Frequency    LORazepam (ATIVAN) injection 1 mg  1 mg IntraMUSCular Q6H PRN    dextrose 5 % - 0.45% NaCl infusion  75 mL/hr IntraVENous CONTINUOUS    aspirin chewable tablet 81 mg  81 mg Oral DAILY    donepeziL (ARICEPT) tablet 10 mg  10 mg Oral PCD    memantine (NAMENDA) tablet 10 mg  10 mg Oral BID    senna (SENOKOT) tablet 17.2 mg  2 Tablet Oral Q6H PRN    sertraline (ZOLOFT) tablet 25 mg  25 mg Oral DAILY    sodium chloride (NS) flush 5-40 mL  5-40 mL IntraVENous Q8H    sodium chloride (NS) flush 5-40 mL  5-40 mL IntraVENous PRN    heparin (porcine) injection 5,000 Units  5,000 Units SubCUTAneous Q8H    levoFLOXacin (LEVAQUIN) 750 mg in D5W IVPB  750 mg IntraVENous Q48H    acetaminophen (TYLENOL) tablet 650 mg  650 mg Oral Q4H PRN    haloperidol lactate (HALDOL) injection 2 mg  2 mg IntraMUSCular Q4H PRN    QUEtiapine (SEROquel) tablet 50 mg  50 mg Oral QHS     ______________________________________________________________________  EXPECTED LENGTH OF STAY: - - -  ACTUAL LENGTH OF STAY:          2                 Pura Ford MD

## 2022-04-27 NOTE — CONSULTS
Requesting Provider: Susanne Valenzuela MD - Reason for Consultation: \"meatal stenosis\"  Pre-existing [facility] Patient:   No                Patient: Danny Nicolas MRN: [id number]  SSN: [id number]    YOB: 1933  Age: 80 y.o. Sex: male     Location: 207/02       Code Status: DNR   PCP: Maverick Marcelino MD  - [phone]   Emergency Contact:  Primary Emergency Contact: Yuval Bell, Home Phone: [phone]   Race/Amish/Language: Jesus Fong / Karen Gilliland / Alicja Johnson   Payor: Payor: Alise Jordan / Plan: Anders Babinski / Product Type: Medicare /    Prior Admission Data: 11/2/16 [facility] EMERGENCY DEPT     Hospitalized:  Hospital Day: 3 - Admitted 4/25/2022  7:11 AM     CONSULTANTS  IP CONSULT TO PSYCHIATRY  IP CONSULT TO [address] [place] [address]    ICD-10-CM ICD-9-CM   1. Altered mental status, unspecified altered mental status type  R41.82 780.97   2. Fever, unspecified fever cause  R50.9 780.60   3. Urinary tract infection without hematuria, site unspecified  N39.0 599.0   4. Influenza A  J10.1 487.1         Assessment/Plan:       · UTI - await sensitivities then culture specific abx. · Meatal stenosis - Bladder scan 180 cc. Okay to continue to void independently at this time. Will arrange outpatient follow up for further eval and management. Urology signing off. Please call for questions.      Supervising MD, Dr. Marylin Parsons      CC: Altered mental status (ED visit d/t AMS - comes from [facility]), unknown LKWT - pt's son reports pt with diff ambulating / impulsive behavior / combative at times - mental baseline, was able to speak and have understanding of surround - temp per EMS was 102.0 / FS 80)   HPI: He is a 80 y.o. male past medical history significant for memory impairment with agitation, paroxysmal AFib, hypertension, dyslipidemia, right bundle branch block, lumbar spinal stenosis, gastroesophageal reflux disease, and BPH, who is transferred from his nursing home for AMS with a fever. He has been treated with IV Levaquin for sepsis 2/2 presumed UTI or pneumonia PTA. He is now afebrile. VSS. WBC wnl. Cr wnl. UA +20-50 WBC, 3+ bacteria. His urine culture grew probable proteus. BCx NGTD. He is seen in consult by Urology as a new patient to  for meatal stenosis. The patient is confused and is not able to add to hx collection or ROS. He is voiding independently in a brief, incontinent of urine. Bladder scan 181 cc. The patient is confused, therefore, it is not a perfect post-void result. Temp (24hrs), Av.3 °F (36.8 °C), Min:97.7 °F (36.5 °C), Max:98.8 °F (37.1 °C)    Urinary Status: Voiding,Diaper  Creatinine   Date/Time Value Ref Range Status   2022 04:07 AM 0.88 0.70 - 1.30 MG/DL Final   2022 07:23 AM 1.21 0.70 - 1.30 MG/DL Final   2017 11:01 AM 1.05 0.76 - 1.27 mg/dL Final   2017 10:59 AM 0.99 0.76 - 1.27 mg/dL Final   2016 10:39 AM 0.91 0.76 - 1.27 mg/dL Final     Current Antimicrobial Therapy (168h ago, onward)     Ordered     Start Stop    22 1100  levoFLOXacin (LEVAQUIN) 750 mg in D5W IVPB  750 mg,   IntraVENous,   EVERY 24 HOURS        References:    Lexicomp    22 1200 22 1159    22 1033  oseltamivir (TAMIFLU) capsule 75 mg  75 mg,   Oral,   EVERY 12 HOURS        References:    Lexicomp    22 1100 22 0859              Key Anti-Platelet Anticoagulant Meds     The patient is on no antiplatelet meds or anticoagulants.         Diet: ADULT DIET Regular -       Labs     Lab Results   Component Value Date/Time    WBC 4.9 2022 04:07 AM    HCT 42.6 2022 04:07 AM    PLATELET 121  04:07 AM    Sodium 140 2022 04:07 AM    Potassium 3.8 2022 04:07 AM    Chloride 107 2022 04:07 AM    CO2 27 2022 04:07 AM    BUN 19 2022 04:07 AM    Creatinine 0.88 2022 04:07 AM    Glucose 104 (H) 2022 04:07 AM    Calcium 8.2 (L) 2022 04:07 AM    Magnesium 2.3 04/27/2022 04:07 AM    INR 1.1 10/09/2016 07:33 AM    Prostate Specific Ag 1.7 02/04/2013 10:04 AM     UA:   Lab Results   Component Value Date/Time    Color YELLOW/STRAW 04/25/2022 09:11 AM    Appearance TURBID (A) 04/25/2022 09:11 AM    Specific gravity 1.018 04/25/2022 09:11 AM    pH (UA) 7.5 04/25/2022 09:11 AM    Protein 100 (A) 04/25/2022 09:11 AM    Glucose Negative 04/25/2022 09:11 AM    Ketone TRACE (A) 04/25/2022 09:11 AM    Bilirubin Negative 04/25/2022 09:11 AM    Urobilinogen 1.0 04/25/2022 09:11 AM    Nitrites Negative 04/25/2022 09:11 AM    Leukocyte Esterase LARGE (A) 04/25/2022 09:11 AM    Epithelial cells FEW 04/25/2022 09:11 AM    Bacteria 3+ (A) 04/25/2022 09:11 AM    WBC 20-50 04/25/2022 09:11 AM    RBC 0-5 04/25/2022 09:11 AM     Imaging     Results for orders placed during the hospital encounter of 04/25/22    CT HEAD WO CONT    Narrative  INDICATION: pupillary changes/AMS    EXAM: CT HEAD without contrast.    TECHNIQUE: Unenhanced CT Head is performed. CT dose reduction was achieved  through use of a standardized protocol tailored for this examination and  automatic exposure control for dose modulation. FINDINGS: Brain parenchyma shows no CT apparent ischemia. There is no apparent  mass on unenhanced imaging. There is no bleed, shift, obstructive hydrocephalus  or significant extra-axial fluid collection. Bone windows are unremarkable. Impression  No acute intracranial finding. US Results (most recent):  No results found for this or any previous visit.       Cultures     All Micro Results     Procedure Component Value Units Date/Time    CULTURE, BLOOD, PAIRED [[id number]] Collected: 04/25/22 0853    Order Status: Completed Specimen: Blood Updated: 04/27/22 6113     Special Requests: NO SPECIAL REQUESTS        Culture result: NO GROWTH 2 DAYS       CULTURE, URINE [[id number]]  (Abnormal) Collected: 04/25/22 0911    Order Status: Completed Specimen: Urine Updated: 04/26/22 1516     Special Requests: --        NO SPECIAL REQUESTS  Reflexed from [id number]       [place] Count --        >100,000  COLONIES/mL       Culture result: PROBABLE PROTEUS SPECIES       COVID-19 RAPID TEST [[id number]] Collected: 04/25/22 0740    Order Status: Completed Specimen: Nasopharyngeal Updated: 04/25/22 0814     Specimen source Nasopharyngeal        COVID-19 rapid test Not detected        Comment: Rapid Abbott ID Now       Rapid NAAT:  The specimen is NEGATIVE for SARS-CoV-2, the novel coronavirus associated with COVID-19. Negative results should be treated as presumptive and, if inconsistent with clinical signs and symptoms or necessary for patient management, should be tested with an alternative molecular assay. Negative results do not preclude SARS-CoV-2 infection and should not be used as the sole basis for patient management decisions. This test has been authorized by the FDA under an Emergency Use Authorization (EUA) for use by authorized laboratories.    Fact sheet for Healthcare Providers: Mobile Event Guide.co.nz  Fact sheet for Patients: Mobile Event Guide.co.nz       Methodology: Isothermal Nucleic Acid Amplification                Past History: (Complete 2+/3 areas)     Allergies   Allergen Reactions    Lipitor [Atorvastatin] Other (comments)     arthralgia    Pcn [Penicillins] Unknown (comments)    Pravastatin Unknown (comments)      Current Facility-Administered Medications   Medication Dose Route Frequency    LORazepam (ATIVAN) injection 1 mg  1 mg IntraMUSCular Q6H PRN    oseltamivir (TAMIFLU) capsule 75 mg  75 mg Oral Q12H    levoFLOXacin (LEVAQUIN) 750 mg in D5W IVPB  750 mg IntraVENous Q24H    dextrose 5 % - 0.45% NaCl infusion  75 mL/hr IntraVENous CONTINUOUS    aspirin chewable tablet 81 mg  81 mg Oral DAILY    donepeziL (ARICEPT) tablet 10 mg  10 mg Oral PCD    memantine (NAMENDA) tablet 10 mg  10 mg Oral BID    senna (SENOKOT) tablet 17.2 mg  2 Tablet Oral Q6H PRN    sertraline (ZOLOFT) tablet 25 mg  25 mg Oral DAILY    sodium chloride (NS) flush 5-40 mL  5-40 mL IntraVENous Q8H    sodium chloride (NS) flush 5-40 mL  5-40 mL IntraVENous PRN    heparin (porcine) injection 5,000 Units  5,000 Units SubCUTAneous Q8H    acetaminophen (TYLENOL) tablet 650 mg  650 mg Oral Q4H PRN    haloperidol lactate (HALDOL) injection 2 mg  2 mg IntraMUSCular Q4H PRN    QUEtiapine (SEROquel) tablet 50 mg  50 mg Oral QHS    Prior to Admission medications    Medication Sig Start Date End Date Taking? Authorizing Provider   memantine (Namenda) 10 mg tablet Take 10 mg by mouth two (2) times a day. Yes Other, MD Nila   senna (Senna) 8.6 mg tablet Take 2 Tablets by mouth every six (6) hours as needed for Constipation. Indications: constipation   Yes Marcia, MD Nila   acetaminophen (TylenoL) 325 mg tablet Take 325 mg by mouth every four (4) hours as needed for Pain. Indications: fever, pain   Yes Marcia, MD Nila   vitamin a & d (A&D) ointment Apply  to affected area two (2) times a day. Apply to sacrum   Yes Provider, Historical   sertraline (ZOLOFT) 50 mg tablet Days 1-7, take 25 mg daily. Starting day 8 and thereafter, 50 mg daily  Patient taking differently: 25 mg. Days 1-7, take 25 mg daily. Starting day 8 and thereafter, 50 mg daily  12/20/16  Yes Mercy Kurtz IV, MD   donepezil (ARICEPT) 10 mg tablet Take 1 Tab by mouth daily (after dinner).  11/22/16  Yes Mercy Kurtz IV, MD   simvastatin (ZOCOR) 5 mg tablet TAKE ONE (1) TABLET(S) DAILY AT BEDTIME 9/15/16  Yes Devin Hernandez MD        PMHx:  has a past medical history of Bleeding duodenal ulcer, BPH (benign prostatic hyperplasia) (10/9/2013), Cancer ([facility] [address] Carpal tunnel syndrome, DJD (degenerative joint disease), GERD (gastroesophageal reflux disease) (1/25/2012), Glucose intolerance (impaired glucose tolerance) (7/20/2011), HTN (hypertension) (7/20/2011), Hyperlipidemia (7/20/2011), Lumbar spinal stenosis, Mastoiditis, Memory changes (8/20/2015), PAF (paroxysmal atrial fibrillation) ([facility] [address] (7/20/2011), RBBB (right bundle branch block), and Zoster. PSurgHx:  has a past surgical history that includes hx other surgical; hx other surgical; hx tonsil and adenoidectomy; and hx appendectomy. PSocHx:  reports that he has quit smoking. He does not have any smokeless tobacco history on file. He reports that he does not drink alcohol and does not use drugs.    ROS:  (Complete - 10 systems) - PAT 2/2 confusion    Physical Exam: (Comprehesive - 8+ 1995 Systems)     (1) Constitutional:  NAD; resting  FIO2:   on SpO2: O2 Sat (%): 100 %  O2 Device: None (Room air)    Patient Vitals for the past 24 hrs:   BP Temp Pulse Resp SpO2   04/27/22 1202 108/67 98.7 °F (37.1 °C)   100 %   04/27/22 1200   68     04/27/22 1000   74     04/27/22 0953 (!) 105/48 98.8 °F (37.1 °C) 79 18 100 %   04/27/22 0800   82     04/27/22 0151 136/73 98.1 °F (36.7 °C) 79 18 94 %   04/27/22 0100     93 %   04/26/22 2330 110/74 97.8 °F (36.6 °C) 88 19 92 %   04/26/22 2303 (!) 111/58 98.7 °F (37.1 °C) 84 19 95 %   04/26/22 2004 128/62 97.7 °F (36.5 °C) 62 20 94 %   04/26/22 1800   75     04/26/22 1619 117/66 98.4 °F (36.9 °C) 64 18 95 %   04/26/22 1400   83         Date 04/26/22 0700 - 04/27/22 0659 04/27/22 0700 - 04/28/22 0659   Shift 6250-5546 1353-9614 24 Hour Total 1108-5348 6445-1278 24 Hour Total   INTAKE   I.V.(mL/kg/hr)    75  75     Volume (dextrose 5 % - 0.45% NaCl infusion)    75  75   Shift Total(mL/kg)    75(0.9)  75(0.9)   OUTPUT   Shift Total(mL/kg)         NET    75  75   Weight (kg) 79.4 79.4 79.4 79.4 79.4 79.4      (2) ENMT:   moist mucous membranes, normal sinuses   (3) Respiratory:  breathing easily, no distress   (4) GI:  no abdominal masses, no tenderness   (5) :   Voiding independently, yellow UA, meatal stenosis    (6) Lymphatic:  no adenopathy, neck supple (7) Muscloskeletal:  no gross deformity, normal ROM   (8) Skin:  no rash, warm & dry   (9) Neuro:  Alert, confused, in wrist restraints      Signed By: Indigo Garcia NP  - April 27, 2022

## 2022-04-27 NOTE — CONSULTS
PSYCHIATRY CONSULT NOTE    REASON FOR CONSULT: Evaluation of agitation      HISTORY OF PRESENTING COMPLAINT:  Duc He is a 80 y.o. WHITE/NON- male who is currently admitted to the medical floor at [facility]. Details of his admission is well documented in his chart. Patient was sent to the ED for further evaluation due to agitation. Reportedly, his pcp decreased his sedative medications because patient was a little more lethargic. Psych was consulted for the evaluation of dementia with agitation. Patient was observed with restraints on. Son was at the bedside. Patient was drowsy, responded to his name, murmured a few words and closed his eyes back. Unable to get any history from him. Staff reported that patient has been verbally and physically aggressive towards staff. PAST PSYCHIATRIC HISTORY: hx of depression, anxiety and dementia     SUBSTANCE ABUSE HISTORY: unknown      PAST MEDICAL HISTORY:    Please see H&P for details. Past Medical History:   Diagnosis Date    Bleeding duodenal ulcer     BPH (benign prostatic hyperplasia) 10/9/2013    Cancer (HCC)     melanoma    Carpal tunnel syndrome     DJD (degenerative joint disease)     GERD (gastroesophageal reflux disease) 1/25/2012    Glucose intolerance (impaired glucose tolerance) 7/20/2011    HTN (hypertension) 7/20/2011    Hyperlipidemia 7/20/2011    Lumbar spinal stenosis     Mastoiditis     Memory changes 8/20/2015    PAF (paroxysmal atrial fibrillation) ([facility] [address] 7/20/2011    RBBB (right bundle branch block)     Zoster      Prior to Admission medications    Medication Sig Start Date End Date Taking? Authorizing Provider   memantine (Namenda) 10 mg tablet Take 10 mg by mouth two (2) times a day. Yes Other, MD Nila   senna (Senna) 8.6 mg tablet Take 2 Tablets by mouth every six (6) hours as needed for Constipation.  Indications: constipation   Yes Other, MD Nila   acetaminophen (TylenoL) 325 mg tablet Take 325 mg by mouth every four (4) hours as needed for Pain. Indications: fever, pain   Yes Other, MD Nila   vitamin a & d (A&D) ointment Apply  to affected area two (2) times a day. Apply to sacrum   Yes Provider, Historical   sertraline (ZOLOFT) 50 mg tablet Days 1-7, take 25 mg daily. Starting day 8 and thereafter, 50 mg daily  Patient taking differently: 25 mg. Days 1-7, take 25 mg daily. Starting day 8 and thereafter, 50 mg daily  12/20/16  Yes Abiodun Collier IV, MD   donepezil (ARICEPT) 10 mg tablet Take 1 Tab by mouth daily (after dinner). 11/22/16  Yes Abiodun Collier IV, MD   simvastatin (ZOCOR) 5 mg tablet TAKE ONE (1) TABLET(S) DAILY AT BEDTIME 9/15/16  Yes Jose Thompson MD     Vitals:    04/26/22 2303 04/26/22 2330 04/27/22 0100 04/27/22 0151   BP: (!) 111/58 110/74  136/73   Pulse: 84 88  79   Resp: 19 19  18   Temp: 98.7 °F (37.1 °C) 97.8 °F (36.6 °C)  98.1 °F (36.7 °C)   SpO2: 95% 92% 93% 94%   Weight:       Height:         Lab Results   Component Value Date/Time    WBC 4.9 04/27/2022 04:07 AM    HGB (POC) 13.6 12/20/2016 11:11 AM    HGB 14.1 04/27/2022 04:07 AM    HCT (POC) 41.3 12/20/2016 11:11 AM    HCT 42.6 04/27/2022 04:07 AM    PLATELET 761 55/37/8079 04:07 AM    MCV 91.4 04/27/2022 04:07 AM     Lab Results   Component Value Date/Time    Sodium 140 04/27/2022 04:07 AM    Potassium 3.8 04/27/2022 04:07 AM    Chloride 107 04/27/2022 04:07 AM    CO2 27 04/27/2022 04:07 AM    Anion gap 6 04/27/2022 04:07 AM    Glucose 104 (H) 04/27/2022 04:07 AM    BUN 19 04/27/2022 04:07 AM    Creatinine 0.88 04/27/2022 04:07 AM    BUN/Creatinine ratio 22 (H) 04/27/2022 04:07 AM    GFR est AA >60 04/27/2022 04:07 AM    GFR est non-AA >60 04/27/2022 04:07 AM    Calcium 8.2 (L) 04/27/2022 04:07 AM    Bilirubin, total 0.3 04/27/2022 04:07 AM    Alk.  phosphatase 105 04/27/2022 04:07 AM    Protein, total 6.1 (L) 04/27/2022 04:07 AM    Albumin 3.0 (L) 04/27/2022 04:07 AM    Globulin 3.1 04/27/2022 04:07 AM A-G Ratio 1.0 (L) 04/27/2022 04:07 AM    ALT (SGPT) 32 04/27/2022 04:07 AM    AST (SGOT) 65 (H) 04/27/2022 04:07 AM     No results found for: VALF2, VALAC, VALP, VALPR, DS6, CRBAM, CRBAMP, CARB2, XCRBAM  No results found for: LITHM  RADIOLOGY REPORTS:(reviewed/updated 4/27/2022)  CT HEAD WO CONT    Result Date: 4/25/2022  INDICATION: pupillary changes/AMS EXAM: CT HEAD without contrast. TECHNIQUE: Unenhanced CT Head is performed. CT dose reduction was achieved through use of a standardized protocol tailored for this examination and automatic exposure control for dose modulation. FINDINGS: Brain parenchyma shows no CT apparent ischemia. There is no apparent mass on unenhanced imaging. There is no bleed, shift, obstructive hydrocephalus or significant extra-axial fluid collection. Bone windows are unremarkable. No acute intracranial finding. XR CHEST PORT    Result Date: 4/25/2022  EXAM: XR CHEST PORT INDICATION: AMS COMPARISON: 6/25/2007 FINDINGS: A portable AP radiograph of the chest was obtained at 745 hours. Cardiac monitoring leads are noted. . Lung lines are decreased. Mild patchy bibasilar airspace opacities. Trace bilateral pleural effusions. . The cardiac and mediastinal contours and pulmonary vascularity are normal.  The bones and soft tissues are grossly within normal limits. Trace bilateral pleural effusions and mild patchy bibasilar airspace opacities which may represent atelectasis. No results found for: Luly [place] [id number], [id number], [id number], PREGU, POCHCG, MHCGN, HCGQR, THCGA1, SHCG, HCGN, HCGSERUM, HCGURQLPOC    PSYCHOSOCIAL HISTORY: Patient lives in a memory care facility [facility]).  He has a son        MENTAL STATUS EXAM:    General appearance: dressed in hospital apparel   psychomotor activity is decreased  Eye contact: poor eye contact  Speech: incoherent  Affect : blunt  Mood: unknown  Thought Process: illogical  Perception: unable to assess   Thought Content: unable to assess  Insight: Poor  Judgement: poor  Cognition: impaired    ASSESSMENT AND PLAN:  Rita Estrada meets criteria for a diagnosis of  Dementia with behavioral disturbance, delirium due to acute metabolic encephalopathy and uti. Continue with seroquel 50mg at bed time. Continue with zoloft 25mg daily to help with anxiety and mood changes. Continue with haldol 2mg prn for agitation. I will start ativen 1 mg IM to help with agitation. I recommend sitter  Will continue to follow up      Thank your your consult. Please feel free to consult us again as needed.      Nasreen Lou NP  4/27/2022

## 2022-04-27 NOTE — BH NOTES
James Knight  was seen for a behavioral health evaluation. My dictated Psychiatric evaluation note to follow. My current recommendation is for the patient to continue with zoloft 25mg, seroquel 50mg, haldol prn. Will start ativan 1mg prn for agitation. I also recommend sitter for safety.

## 2022-04-28 LAB
ALBUMIN SERPL-MCNC: 3 G/DL (ref 3.5–5)
ALBUMIN/GLOB SERPL: 0.9 {RATIO} (ref 1.1–2.2)
ALP SERPL-CCNC: 112 U/L (ref 45–117)
ALT SERPL-CCNC: 33 U/L (ref 12–78)
ANION GAP SERPL CALC-SCNC: 7 MMOL/L (ref 5–15)
AST SERPL-CCNC: 56 U/L (ref 15–37)
BILIRUB SERPL-MCNC: 0.4 MG/DL (ref 0.2–1)
BUN SERPL-MCNC: 14 MG/DL (ref 6–20)
BUN/CREAT SERPL: 18 (ref 12–20)
CALCIUM SERPL-MCNC: 8.1 MG/DL (ref 8.5–10.1)
CHLORIDE SERPL-SCNC: 107 MMOL/L (ref 97–108)
CO2 SERPL-SCNC: 25 MMOL/L (ref 21–32)
CREAT SERPL-MCNC: 0.77 MG/DL (ref 0.7–1.3)
ERYTHROCYTE [DISTWIDTH] IN BLOOD BY AUTOMATED COUNT: 13.6 % (ref 11.5–14.5)
GLOBULIN SER CALC-MCNC: 3.3 G/DL (ref 2–4)
GLUCOSE SERPL-MCNC: 89 MG/DL (ref 65–100)
HCT VFR BLD AUTO: 43.8 % (ref 36.6–50.3)
HGB BLD-MCNC: 14.3 G/DL (ref 12.1–17)
MAGNESIUM SERPL-MCNC: 2.2 MG/DL (ref 1.6–2.4)
MCH RBC QN AUTO: 30 PG (ref 26–34)
MCHC RBC AUTO-ENTMCNC: 32.6 G/DL (ref 30–36.5)
MCV RBC AUTO: 91.8 FL (ref 80–99)
NRBC # BLD: 0 K/UL (ref 0–0.01)
NRBC BLD-RTO: 0 PER 100 WBC
PLATELET # BLD AUTO: 174 K/UL (ref 150–400)
PMV BLD AUTO: 10.3 FL (ref 8.9–12.9)
POTASSIUM SERPL-SCNC: 3.7 MMOL/L (ref 3.5–5.1)
PROT SERPL-MCNC: 6.3 G/DL (ref 6.4–8.2)
RBC # BLD AUTO: 4.77 M/UL (ref 4.1–5.7)
SODIUM SERPL-SCNC: 139 MMOL/L (ref 136–145)
WBC # BLD AUTO: 4.3 K/UL (ref 4.1–11.1)

## 2022-04-28 PROCEDURE — 83735 ASSAY OF MAGNESIUM: CPT

## 2022-04-28 PROCEDURE — 74011000250 HC RX REV CODE- 250: Performed by: HOSPITALIST

## 2022-04-28 PROCEDURE — 94760 N-INVAS EAR/PLS OXIMETRY 1: CPT

## 2022-04-28 PROCEDURE — 85027 COMPLETE CBC AUTOMATED: CPT

## 2022-04-28 PROCEDURE — 65270000046 HC RM TELEMETRY

## 2022-04-28 PROCEDURE — 74011250636 HC RX REV CODE- 250/636: Performed by: NURSE PRACTITIONER

## 2022-04-28 PROCEDURE — 74011250637 HC RX REV CODE- 250/637: Performed by: HOSPITALIST

## 2022-04-28 PROCEDURE — 36415 COLL VENOUS BLD VENIPUNCTURE: CPT

## 2022-04-28 PROCEDURE — 80053 COMPREHEN METABOLIC PANEL: CPT

## 2022-04-28 PROCEDURE — 74011250636 HC RX REV CODE- 250/636: Performed by: HOSPITALIST

## 2022-04-28 RX ORDER — LEVOFLOXACIN 750 MG/1
750 TABLET ORAL EVERY 24 HOURS
Status: DISCONTINUED | OUTPATIENT
Start: 2022-04-29 | End: 2022-04-29 | Stop reason: HOSPADM

## 2022-04-28 RX ADMIN — MEMANTINE HYDROCHLORIDE 10 MG: 10 TABLET ORAL at 18:52

## 2022-04-28 RX ADMIN — HEPARIN SODIUM 5000 UNITS: 5000 INJECTION INTRAVENOUS; SUBCUTANEOUS at 08:26

## 2022-04-28 RX ADMIN — QUETIAPINE FUMARATE 50 MG: 100 TABLET ORAL at 21:38

## 2022-04-28 RX ADMIN — SODIUM CHLORIDE, PRESERVATIVE FREE 10 ML: 5 INJECTION INTRAVENOUS at 08:26

## 2022-04-28 RX ADMIN — OSELTAMIVIR PHOSPHATE 75 MG: 75 CAPSULE ORAL at 21:38

## 2022-04-28 RX ADMIN — ASPIRIN 81 MG 81 MG: 81 TABLET ORAL at 09:59

## 2022-04-28 RX ADMIN — LEVOFLOXACIN 750 MG: 5 INJECTION, SOLUTION INTRAVENOUS at 12:14

## 2022-04-28 RX ADMIN — HEPARIN SODIUM 5000 UNITS: 5000 INJECTION INTRAVENOUS; SUBCUTANEOUS at 22:58

## 2022-04-28 RX ADMIN — HEPARIN SODIUM 5000 UNITS: 5000 INJECTION INTRAVENOUS; SUBCUTANEOUS at 15:06

## 2022-04-28 RX ADMIN — OSELTAMIVIR PHOSPHATE 75 MG: 75 CAPSULE ORAL at 10:02

## 2022-04-28 RX ADMIN — MEMANTINE HYDROCHLORIDE 10 MG: 10 TABLET ORAL at 09:59

## 2022-04-28 RX ADMIN — SODIUM CHLORIDE, PRESERVATIVE FREE 10 ML: 5 INJECTION INTRAVENOUS at 23:01

## 2022-04-28 RX ADMIN — LORAZEPAM 1 MG: 2 INJECTION INTRAMUSCULAR; INTRAVENOUS at 12:22

## 2022-04-28 RX ADMIN — DONEPEZIL HYDROCHLORIDE 10 MG: 10 TABLET, FILM COATED ORAL at 18:52

## 2022-04-28 RX ADMIN — SERTRALINE 25 MG: 50 TABLET, FILM COATED ORAL at 09:59

## 2022-04-28 NOTE — PROGRESS NOTES
Problem: Falls - Risk of  Goal: *Absence of Falls  Description: Document Denzel Chen Fall Risk and appropriate interventions in the flowsheet. Outcome: Progressing Towards Goal  Note: Fall Risk Interventions:  Mobility Interventions: Bed/chair exit alarm    Mentation Interventions: Bed/chair exit alarm    Medication Interventions: Bed/chair exit alarm    Elimination Interventions: Bed/chair exit alarm,Call light in reach              Problem: Pressure Injury - Risk of  Goal: *Prevention of pressure injury  Description: Document Andrew Scale and appropriate interventions in the flowsheet.   Outcome: Progressing Towards Goal  Note: Pressure Injury Interventions:  Sensory Interventions: Assess changes in LOC    Moisture Interventions: Absorbent underpads    Activity Interventions: Pressure redistribution bed/mattress(bed type)    Mobility Interventions: HOB 30 degrees or less,PT/OT evaluation    Nutrition Interventions: Document food/fluid/supplement intake,Offer support with meals,snacks and hydration    Friction and Shear Interventions: HOB 30 degrees or less,Lift sheet,Minimize layers

## 2022-04-28 NOTE — CONSULTS
PSYCHIATRY CONSULT NOTE    REASON FOR CONSULT: Evaluation of agitation    Interval history:  4/2822: Pt was seen today for follow up agitation. Pt was observed in bed in restraints. Patient is alert but speech is incoherent. He was able to say that he is doing ok. Staff reported that patient is still quite agitated, trying to get out of bed. No report of aggression. He is currently receiving antibiotics for possible pneumonia and uti. HISTORY OF PRESENTING COMPLAINT:  Dominique Schaefer is a 80 y.o. WHITE/NON- male who is currently admitted to the medical floor at [facility]. Details of his admission is well documented in his chart. Patient was sent to the ED for further evaluation due to agitation. Reportedly, his pcp decreased his sedative medications because patient was a little more lethargic. Psych was consulted for the evaluation of dementia with agitation. Patient was observed with restraints on. Son was at the bedside. Patient was drowsy, responded to his name, murmured a few words and closed his eyes back. Unable to get any history from him. Staff reported that patient has been verbally and physically aggressive towards staff. PAST PSYCHIATRIC HISTORY: hx of depression, anxiety and dementia     SUBSTANCE ABUSE HISTORY: unknown      PAST MEDICAL HISTORY:    Please see H&P for details.      Past Medical History:   Diagnosis Date    Bleeding duodenal ulcer     BPH (benign prostatic hyperplasia) 10/9/2013    Cancer (HCC)     melanoma    Carpal tunnel syndrome     DJD (degenerative joint disease)     GERD (gastroesophageal reflux disease) 1/25/2012    Glucose intolerance (impaired glucose tolerance) 7/20/2011    HTN (hypertension) 7/20/2011    Hyperlipidemia 7/20/2011    Lumbar spinal stenosis     Mastoiditis     Memory changes 8/20/2015    PAF (paroxysmal atrial fibrillation) ([facility] [address] 7/20/2011    RBBB (right bundle branch block)     Zoster      Prior to Admission medications Medication Sig Start Date End Date Taking? Authorizing Provider   memantine (Namenda) 10 mg tablet Take 10 mg by mouth two (2) times a day. Yes Marcia, MD Nila   senna (Senna) 8.6 mg tablet Take 2 Tablets by mouth every six (6) hours as needed for Constipation. Indications: constipation   Yes Marcia, MD Nila   acetaminophen (TylenoL) 325 mg tablet Take 325 mg by mouth every four (4) hours as needed for Pain. Indications: fever, pain   Yes Marcia, MD Nila   vitamin a & d (A&D) ointment Apply  to affected area two (2) times a day. Apply to sacrum   Yes Provider, Historical   sertraline (ZOLOFT) 50 mg tablet Days 1-7, take 25 mg daily. Starting day 8 and thereafter, 50 mg daily  Patient taking differently: 25 mg. Days 1-7, take 25 mg daily. Starting day 8 and thereafter, 50 mg daily  12/20/16  Yes Jalil Mart IV, MD   donepezil (ARICEPT) 10 mg tablet Take 1 Tab by mouth daily (after dinner).  11/22/16  Yes Jalil Mart IV, MD   simvastatin (ZOCOR) 5 mg tablet TAKE ONE (1) TABLET(S) DAILY AT BEDTIME 9/15/16  Yes Jalil Mart IV, MD     Vitals:    04/28/22 0400 04/28/22 0445 04/28/22 0600 04/28/22 0816   BP:  124/75  136/85   Pulse: 71 74 69 62   Resp:  18  18   Temp:  97.5 °F (36.4 °C)  97.7 °F (36.5 °C)   SpO2:  97%  99%   Weight:       Height:         Lab Results   Component Value Date/Time    WBC 4.3 04/28/2022 05:30 AM    HGB (POC) 13.6 12/20/2016 11:11 AM    HGB 14.3 04/28/2022 05:30 AM    HCT (POC) 41.3 12/20/2016 11:11 AM    HCT 43.8 04/28/2022 05:30 AM    PLATELET 010 42/43/6777 05:30 AM    MCV 91.8 04/28/2022 05:30 AM     Lab Results   Component Value Date/Time    Sodium 139 04/28/2022 05:30 AM    Potassium 3.7 04/28/2022 05:30 AM    Chloride 107 04/28/2022 05:30 AM    CO2 25 04/28/2022 05:30 AM    Anion gap 7 04/28/2022 05:30 AM    Glucose 89 04/28/2022 05:30 AM    BUN 14 04/28/2022 05:30 AM    Creatinine 0.77 04/28/2022 05:30 AM    BUN/Creatinine ratio 18 04/28/2022 05:30 AM GFR est AA >60 04/28/2022 05:30 AM    GFR est non-AA >60 04/28/2022 05:30 AM    Calcium 8.1 (L) 04/28/2022 05:30 AM    Bilirubin, total 0.4 04/28/2022 05:30 AM    Alk. phosphatase 112 04/28/2022 05:30 AM    Protein, total 6.3 (L) 04/28/2022 05:30 AM    Albumin 3.0 (L) 04/28/2022 05:30 AM    Globulin 3.3 04/28/2022 05:30 AM    A-G Ratio 0.9 (L) 04/28/2022 05:30 AM    ALT (SGPT) 33 04/28/2022 05:30 AM    AST (SGOT) 56 (H) 04/28/2022 05:30 AM     No results found for: VALF2, VALAC, VALP, VALPR, DS6, CRBAM, CRBAMP, CARB2, XCRBAM  No results found for: LITHM  RADIOLOGY REPORTS:(reviewed/updated 4/28/2022)  CT HEAD WO CONT    Result Date: 4/25/2022  INDICATION: pupillary changes/AMS EXAM: CT HEAD without contrast. TECHNIQUE: Unenhanced CT Head is performed. CT dose reduction was achieved through use of a standardized protocol tailored for this examination and automatic exposure control for dose modulation. FINDINGS: Brain parenchyma shows no CT apparent ischemia. There is no apparent mass on unenhanced imaging. There is no bleed, shift, obstructive hydrocephalus or significant extra-axial fluid collection. Bone windows are unremarkable. No acute intracranial finding. XR CHEST PORT    Result Date: 4/25/2022  EXAM: XR CHEST PORT INDICATION: AMS COMPARISON: 6/25/2007 FINDINGS: A portable AP radiograph of the chest was obtained at 745 hours. Cardiac monitoring leads are noted. . Lung lines are decreased. Mild patchy bibasilar airspace opacities. Trace bilateral pleural effusions. . The cardiac and mediastinal contours and pulmonary vascularity are normal.  The bones and soft tissues are grossly within normal limits. Trace bilateral pleural effusions and mild patchy bibasilar airspace opacities which may represent atelectasis.     No results found for: Jett Bouche [id number], [id number], [id number], PREGU, POCHCG, MHCGN, HCGQR, THCGA1, SHCG, HCGN, HCGSERUM, HCGURQLPOC    PSYCHOSOCIAL HISTORY: Patient lives in a memory care facility [facility] He has a son        MENTAL STATUS EXAM:    General appearance: dressed in hospital apparel   psychomotor activity is decreased  Eye contact: poor eye contact  Speech: incoherent  Affect : blunt  Mood: \"ok\"  Thought Process: illogical  Perception: unable to assess   Thought Content: unable to assess  Insight: Poor  Judgement: poor  Cognition: impaired    ASSESSMENT AND PLAN:  Iftikhar Nixon meets criteria for a diagnosis of  Dementia with behavioral disturbance, delirium due to acute metabolic encephalopathy and uti. Continue with current medications as prescribed. Patient appears safe at this time. There is no indication for inpatient psychiatric admission. Patient can be discharged to appropriate facility once medically cleared. I recommend following up with out patient mental health provider. Thank your your consult. Please feel free to consult us again as needed.      Umm Wyatt NP  4/28/2022

## 2022-04-28 NOTE — PROGRESS NOTES
[address] [facility]  Hospitalist Group                                                                                          Hospitalist Progress Note  Sierra Calixto MD  Answering service: [phone] from in house phone        Date of Service:  2022  NAME:  Layo Jeong  :  1933  MRN:  [id number]      Admission Summary: This is an 80-year-old  male with past medical history significant for memory impairment, paroxysmal AFib, hypertension, dyslipidemia, right bundle branch block, lumbar spinal stenosis, gastroesophageal reflux disease, and BPH, who is transferred from the nursing home after he became altered with agitation and restless. He had also fever of 103. It was unable to get detailed information from him. His son was at the bedside. He stated that the last time he saw him was on 2022. At that time, he was relatively stable but he had on and off confusion, agitation and restlessness then worse for the last couple of days. He said his Zoloft dose recently reduced. No cough. Patient denies cough, left-sided chest pain, nausea, vomiting, or abdominal pain. The patient was restless, agitated, and combative in ER, Haldol IV 2 mg was given, placed on restraints and his son was in the room. His vital signs were blood pressure of 130/96, pulse 118, temperature 102.1, saturation of oxygen 100% on room air. CT scan of the head was done and no acute intracranial findings. .  Chest x-ray, trace bilateral pleural effusion and mild patchy bibasilar airspace opacities which may represent atelectasis, and the patient referred to hospitalist service for further evaluation and admission. Interval history / Subjective:     Patient conscious and alert, calm today, working with PT    His sons Chaya Armaniing in the room, updated the clinical finding, care plan, and answered questions.      Assessment & Plan:     Delirium, Acute metabolic encephalopathy with underlying dementia  -CT head no acute intracranial pathology   -patient is conscious and alert, disoriented, restless and agitated  -continue seroquel 50 mg q hs, prn haldol and ativan  -EKG normal sinus rhythm vent rate 71 bpm non specific st t wave  -continue soft restraint  -continue neuro check and supportive care   -seen by psychiatrist   -dc sitter, DC soft restrains     UTI with sepsis POA  -fever, tachycardia, change in mental status improved now.   -continue IV levaquin, dc ivf 4/28   -urine culture grow proteus Mirabilis, sensitive to levaquin  -blood cx no growth so far  -patient allergic to PCN    Possible pneumonia, Influenza A infection  -on iv levaquin, Tamiflu and IVF  -chest x ray trace bilateral pleural effusions and mild patchy bibasilar airspace opacities which may represent atelectasis. -continue levaquin and IVF  -afebrile no leukocytosis  -SpO2 90-94% on RA    HTN  -BP BP normal, monitor BP    Hx of Paroxysmal atrial fibrillation, now NSR  -continue aspirin  -not on BB or CCB    Prepuce or foreskin meatus stricture.  -urologist is consulted    Hx of benign prostatic hyperplasia. -stable    Hx of dementia with agitation.  -on aricept, namenda, zoloft and  seroquel    Dyslipidemia.  -can resume zocor on discharge           Code status: DNR  Prophylaxis: Heparin  Care Plan discussed with: Patient, his son at bed side, nurse and CM  Anticipated Disposition: [facility] unit  In 1-2 days      Hospital Problems  Date Reviewed: 1/31/2017          Codes Class Noted POA    Fever ICD-10-CM: R50.9  ICD-9-CM: 780.60  4/25/2022 Unknown               Vital Signs:    Last 24hrs VS reviewed since prior progress note.  Most recent are:  Visit Vitals  /66 (BP 1 Location: Left upper arm, BP Patient Position: At rest)   Pulse (!) 58   Temp 97.5 °F (36.4 °C)   Resp 18   Ht 6' (1.829 m)   Wt 79.4 kg (175 lb 0.7 oz)   SpO2 97%   BMI 23.74 kg/m²       No intake or output data in the 24 hours ending 04/28/22 1417     Physical Examination:     I had a face to face encounter with this patient and independently examined them on 4/28/2022 as outlined below:          Constitutional:  No acute distress,demented, answer questions    ENT:  Oral mucosa moist, oropharynx benign. Resp:  CTA bilaterally. No wheezing/rhonchi/rales. No accessory muscle use. CV:  Regular rhythm, normal rate, no murmurs, gallops, rubs    GI:  Soft, non distended, non tender. normoactive bowel sounds, no hepatosplenomegaly     Musculoskeletal:  No edema,     Neurologic:  Conscious and alert,  disoriented, moves all extremities             Data Review:    Review and/or order of clinical lab test  Review and/or order of tests in the radiology section of CPT  Review and/or order of tests in the medicine section of CPT      Labs:     Recent Labs     04/28/22 0530 04/27/22  0407   WBC 4.3 4.9   HGB 14.3 14.1   HCT 43.8 42.6    183     Recent Labs     04/28/22 0530 04/27/22  0407    140   K 3.7 3.8    107   CO2 25 27   BUN 14 19   CREA 0.77 0.88   GLU 89 104*   CA 8.1* 8.2*   MG 2.2 2.3     Recent Labs     04/28/22 0530 04/27/22  0407   ALT 33 32    105   TBILI 0.4 0.3   TP 6.3* 6.1*   ALB 3.0* 3.0*   GLOB 3.3 3.1     No results for input(s): INR, PTP, APTT, INREXT, INREXT in the last 72 hours. No results for input(s): FE, TIBC, PSAT, FERR in the last 72 hours. No results found for: FOL, RBCF   No results for input(s): PH, PCO2, PO2 in the last 72 hours. No results for input(s): CPK, CKNDX, TROIQ in the last 72 hours.     No lab exists for component: CPKMB  No results found for: CHOL, CHOLX, CHLST, CHOLV, HDL, HDLP, LDL, LDLC, DLDLP, TGLX, TRIGL, TRIGP, CHHD, CHHDX  Lab Results   Component Value Date/Time    Glucose (POC) 109 04/26/2022 11:02 AM    Glucose (POC) 93 04/25/2022 07:22 AM    Glucose  (A) 10/09/2013 10:42 AM    Glucose  (A) 06/04/2013 10:16 AM    Glucose  (A) 09/26/2012 09:05 AM     Lab Results   Component Value Date/Time    Color YELLOW/STRAW 04/25/2022 09:11 AM    Appearance TURBID (A) 04/25/2022 09:11 AM    Specific gravity 1.018 04/25/2022 09:11 AM    pH (UA) 7.5 04/25/2022 09:11 AM    Protein 100 (A) 04/25/2022 09:11 AM    Glucose Negative 04/25/2022 09:11 AM    Ketone TRACE (A) 04/25/2022 09:11 AM    Bilirubin Negative 04/25/2022 09:11 AM    Urobilinogen 1.0 04/25/2022 09:11 AM    Nitrites Negative 04/25/2022 09:11 AM    Leukocyte Esterase LARGE (A) 04/25/2022 09:11 AM    Epithelial cells FEW 04/25/2022 09:11 AM    Bacteria 3+ (A) 04/25/2022 09:11 AM    WBC 20-50 04/25/2022 09:11 AM    RBC 0-5 04/25/2022 09:11 AM         Medications Reviewed:     Current Facility-Administered Medications   Medication Dose Route Frequency    [START ON 4/29/2022] levoFLOXacin (LEVAQUIN) tablet 750 mg  750 mg Oral Q24H    LORazepam (ATIVAN) injection 1 mg  1 mg IntraMUSCular Q6H PRN    oseltamivir (TAMIFLU) capsule 75 mg  75 mg Oral Q12H    dextrose 5 % - 0.45% NaCl infusion  75 mL/hr IntraVENous CONTINUOUS    aspirin chewable tablet 81 mg  81 mg Oral DAILY    donepeziL (ARICEPT) tablet 10 mg  10 mg Oral PCD    memantine (NAMENDA) tablet 10 mg  10 mg Oral BID    senna (SENOKOT) tablet 17.2 mg  2 Tablet Oral Q6H PRN    sertraline (ZOLOFT) tablet 25 mg  25 mg Oral DAILY    sodium chloride (NS) flush 5-40 mL  5-40 mL IntraVENous Q8H    sodium chloride (NS) flush 5-40 mL  5-40 mL IntraVENous PRN    heparin (porcine) injection 5,000 Units  5,000 Units SubCUTAneous Q8H    acetaminophen (TYLENOL) tablet 650 mg  650 mg Oral Q4H PRN    haloperidol lactate (HALDOL) injection 2 mg  2 mg IntraMUSCular Q4H PRN    QUEtiapine (SEROquel) tablet 50 mg  50 mg Oral QHS     ______________________________________________________________________  EXPECTED LENGTH OF STAY: 4d 2h  ACTUAL LENGTH OF STAY:          3                 Aracelis Wagoner MD

## 2022-04-28 NOTE — PROGRESS NOTES
Clinical Pharmacy Note: Re: IV to PO Automatic Conversion - Antibiotic    Please note: Negro Jake medication(s) (levofloxacin) has/have been changed from IV to PO based on the following criteria:    The patient:  Received IV therapy for at least 24 hours   Is tolerating diet more advanced than clear liquids  Is tolerating oral medications  Is not on vasopressor blood pressure support (i.e. no signs of shock)      This IV to PO conversion is based on the [employer] approved automatic conversion policy for eligible patients. Please call with questions.

## 2022-04-28 NOTE — PROGRESS NOTES
Physician Progress Note      PATIENT:               Rita Ruelas  CSN #:                  [id number]  :                       1933  ADMIT DATE:       2022 7:11 AM  DISCH DATE:  RESPONDING  PROVIDER #:        Geovani Dhaliwal MD          QUERY TEXT:    Noted documentation of Sepsis. Temp 102.1, , RR 20, with AMS, however inflammatory markers are WNL with WBC 8.6, and Lactic Acid 0.55. In order to support the diagnosis of sepsis, please include additional clinical indicators in your documentation. Or please document if the diagnosis of sepsis has been ruled out after further study. The medical record reflects the following:  Risk Factors: possible PNA and UTI  Clinical Indicators: admitted with fever of 102.1, and , and noted to have AMS- confusion and agitation. Inflammatory markers are WNL with WBC 8.6, and Lactic Acid 0.55. Preliminary B/C are negative so far. Sepsis is documented in the H&P and subsequent progress note. Treatment: 1L NS IV Bolus, Levaquin IV. Thank you,  Arsen Hanna RN  Clinical Documentation  [phone]  Options provided:  -- Sepsis present as evidenced by, Please document evidence.   -- Sepsis was ruled out after study  -- Other - I will add my own diagnosis  -- Disagree - Not applicable / Not valid  -- Disagree - Clinically unable to determine / Unknown  -- Refer to Clinical Documentation Reviewer    PROVIDER RESPONSE TEXT:    Sepsis is present as evidenced by fever, tachycardia, change in mental status    Query created by: Esteban Olivera on 2022 1:08 PM      Electronically signed by:  Geovani Dhaliwal MD 2022 9:10 PM

## 2022-04-28 NOTE — PROGRESS NOTES
Transition of Care Plan   RUR- 10% Moderate Risk   DISPOSITION: The disposition plan is transition back to [facility] - [facility] - patient accepted.  Home with home health -HHPT/OT - [facility]  [phone] - has accepted patient.  F/U with PCP/Specialist     Transport: Family - Son    [facility]ia Friedman (memory care unit)  [address], [address]  Phone Number: [phone]  SW: Estela Zaid: [phone]    Patient has been recommended for HHPT/OT at [facility]. [facility]  [phone] - has accepted patient. AVS has been updated. Patients son to provide transport at discharge.

## 2022-04-28 NOTE — PROGRESS NOTES
Bedside shift change report given to Renetta Corona RN (oncoming nurse) by Andi Momin RN (offgoing nurse). Report included the following information SBAR and Kardex.

## 2022-04-29 VITALS
HEART RATE: 60 BPM | RESPIRATION RATE: 18 BRPM | TEMPERATURE: 97.5 F | HEIGHT: 72 IN | BODY MASS INDEX: 23.71 KG/M2 | SYSTOLIC BLOOD PRESSURE: 120 MMHG | WEIGHT: 175.04 LBS | DIASTOLIC BLOOD PRESSURE: 67 MMHG | OXYGEN SATURATION: 95 %

## 2022-04-29 LAB
ALBUMIN SERPL-MCNC: 2.9 G/DL (ref 3.5–5)
ALBUMIN/GLOB SERPL: 0.9 {RATIO} (ref 1.1–2.2)
ALP SERPL-CCNC: 114 U/L (ref 45–117)
ALT SERPL-CCNC: 30 U/L (ref 12–78)
ANION GAP SERPL CALC-SCNC: 6 MMOL/L (ref 5–15)
AST SERPL-CCNC: 43 U/L (ref 15–37)
BILIRUB SERPL-MCNC: 0.4 MG/DL (ref 0.2–1)
BUN SERPL-MCNC: 13 MG/DL (ref 6–20)
BUN/CREAT SERPL: 18 (ref 12–20)
CALCIUM SERPL-MCNC: 8.2 MG/DL (ref 8.5–10.1)
CHLORIDE SERPL-SCNC: 108 MMOL/L (ref 97–108)
CO2 SERPL-SCNC: 26 MMOL/L (ref 21–32)
COVID-19 RAPID TEST, COVR: NOT DETECTED
CREAT SERPL-MCNC: 0.71 MG/DL (ref 0.7–1.3)
ERYTHROCYTE [DISTWIDTH] IN BLOOD BY AUTOMATED COUNT: 13.4 % (ref 11.5–14.5)
GLOBULIN SER CALC-MCNC: 3.1 G/DL (ref 2–4)
GLUCOSE SERPL-MCNC: 92 MG/DL (ref 65–100)
HCT VFR BLD AUTO: 43.9 % (ref 36.6–50.3)
HGB BLD-MCNC: 14.5 G/DL (ref 12.1–17)
MAGNESIUM SERPL-MCNC: 2.1 MG/DL (ref 1.6–2.4)
MCH RBC QN AUTO: 30.1 PG (ref 26–34)
MCHC RBC AUTO-ENTMCNC: 33 G/DL (ref 30–36.5)
MCV RBC AUTO: 91.3 FL (ref 80–99)
NRBC # BLD: 0 K/UL (ref 0–0.01)
NRBC BLD-RTO: 0 PER 100 WBC
PLATELET # BLD AUTO: 165 K/UL (ref 150–400)
PMV BLD AUTO: 10.1 FL (ref 8.9–12.9)
POTASSIUM SERPL-SCNC: 3.8 MMOL/L (ref 3.5–5.1)
PROT SERPL-MCNC: 6 G/DL (ref 6.4–8.2)
RBC # BLD AUTO: 4.81 M/UL (ref 4.1–5.7)
SODIUM SERPL-SCNC: 140 MMOL/L (ref 136–145)
SOURCE, COVRS: NORMAL
WBC # BLD AUTO: 4.6 K/UL (ref 4.1–11.1)

## 2022-04-29 PROCEDURE — 97530 THERAPEUTIC ACTIVITIES: CPT

## 2022-04-29 PROCEDURE — 94760 N-INVAS EAR/PLS OXIMETRY 1: CPT

## 2022-04-29 PROCEDURE — 74011250637 HC RX REV CODE- 250/637: Performed by: HOSPITALIST

## 2022-04-29 PROCEDURE — 36415 COLL VENOUS BLD VENIPUNCTURE: CPT

## 2022-04-29 PROCEDURE — 87635 SARS-COV-2 COVID-19 AMP PRB: CPT

## 2022-04-29 PROCEDURE — 74011250636 HC RX REV CODE- 250/636: Performed by: NURSE PRACTITIONER

## 2022-04-29 PROCEDURE — 85027 COMPLETE CBC AUTOMATED: CPT

## 2022-04-29 PROCEDURE — 83735 ASSAY OF MAGNESIUM: CPT

## 2022-04-29 PROCEDURE — 80053 COMPREHEN METABOLIC PANEL: CPT

## 2022-04-29 PROCEDURE — 74011250636 HC RX REV CODE- 250/636: Performed by: HOSPITALIST

## 2022-04-29 PROCEDURE — 74011000250 HC RX REV CODE- 250: Performed by: HOSPITALIST

## 2022-04-29 RX ORDER — GUAIFENESIN 100 MG/5ML
81 LIQUID (ML) ORAL DAILY
Status: SHIPPED | COMMUNITY
Start: 2022-04-30

## 2022-04-29 RX ORDER — QUETIAPINE FUMARATE 50 MG/1
50 TABLET, FILM COATED ORAL
Qty: 30 TABLET | Refills: 0 | Status: SHIPPED | OUTPATIENT
Start: 2022-04-29

## 2022-04-29 RX ORDER — LEVOFLOXACIN 750 MG/1
750 TABLET ORAL EVERY 24 HOURS
Qty: 2 TABLET | Refills: 0 | Status: SHIPPED | OUTPATIENT
Start: 2022-04-29 | End: 2022-05-01

## 2022-04-29 RX ORDER — OSELTAMIVIR PHOSPHATE 75 MG/1
75 CAPSULE ORAL EVERY 12 HOURS
Qty: 5 CAPSULE | Refills: 0 | Status: SHIPPED | OUTPATIENT
Start: 2022-04-29 | End: 2022-05-02

## 2022-04-29 RX ORDER — OSELTAMIVIR PHOSPHATE 75 MG/1
75 CAPSULE ORAL EVERY 12 HOURS
Qty: 5 CAPSULE | Refills: 0 | Status: SHIPPED | OUTPATIENT
Start: 2022-04-29 | End: 2022-04-29 | Stop reason: SDUPTHER

## 2022-04-29 RX ADMIN — DONEPEZIL HYDROCHLORIDE 10 MG: 10 TABLET, FILM COATED ORAL at 17:29

## 2022-04-29 RX ADMIN — SODIUM CHLORIDE, PRESERVATIVE FREE 10 ML: 5 INJECTION INTRAVENOUS at 14:00

## 2022-04-29 RX ADMIN — HEPARIN SODIUM 5000 UNITS: 5000 INJECTION INTRAVENOUS; SUBCUTANEOUS at 07:10

## 2022-04-29 RX ADMIN — SODIUM CHLORIDE, PRESERVATIVE FREE 10 ML: 5 INJECTION INTRAVENOUS at 06:00

## 2022-04-29 RX ADMIN — HEPARIN SODIUM 5000 UNITS: 5000 INJECTION INTRAVENOUS; SUBCUTANEOUS at 14:40

## 2022-04-29 RX ADMIN — LEVOFLOXACIN 750 MG: 750 TABLET, FILM COATED ORAL at 13:57

## 2022-04-29 RX ADMIN — LORAZEPAM 1 MG: 2 INJECTION INTRAMUSCULAR; INTRAVENOUS at 17:29

## 2022-04-29 RX ADMIN — ASPIRIN 81 MG 81 MG: 81 TABLET ORAL at 09:47

## 2022-04-29 RX ADMIN — MEMANTINE HYDROCHLORIDE 10 MG: 10 TABLET ORAL at 17:29

## 2022-04-29 RX ADMIN — OSELTAMIVIR PHOSPHATE 75 MG: 75 CAPSULE ORAL at 09:47

## 2022-04-29 RX ADMIN — SERTRALINE 25 MG: 50 TABLET, FILM COATED ORAL at 09:47

## 2022-04-29 RX ADMIN — MEMANTINE HYDROCHLORIDE 10 MG: 10 TABLET ORAL at 09:47

## 2022-04-29 NOTE — DISCHARGE SUMMARY
Discharge Summary   PATIENT ID: Jj Kay  MRN: [id number]   YOB: 1933    DATE OF ADMISSION: 4/25/2022  7:11 AM    DATE OF DISCHARGE: 4/29/2022    PRIMARY CARE PROVIDER: Jerl Ormond, MD       ATTENDING PHYSICIAN: Cami Myers MD  DISCHARGING PROVIDER: Chandler Castillo MD     To contact this individual call [phone] and ask the  to page. If unavailable ask to be transferred the Adult Hospitalist Department. CONSULTATIONS: IP CONSULT TO PSYCHIATRY  IP CONSULT TO UROLOGY  IP CONSULT TO PSYCHIATRY    PROCEDURES/SURGERIES: * No surgery found *    ADMITTING SUMMARY  This is an 70-year-old  male with past medical history significant for memory impairment, paroxysmal AFib, hypertension, dyslipidemia, right bundle branch block, lumbar spinal stenosis, gastroesophageal reflux disease, and BPH, who is transferred from the nursing home after he became altered with agitation and restless.  He had also fever of 103. It was unable to get detailed information from him. [facility] son was at the bedside. Hieu Gifford stated that the last time he saw him was on 04/21/2022. Cheryl Dumontaves that time, he was relatively stable but he had on and off confusion, agitation and restlessness then worse for the last couple of days. He said his Zoloft dose recently reduced. No cough.  Patient denies cough, left-sided chest pain, nausea, vomiting, or abdominal pain.  The patient was restless, agitated, and combative in ER, Haldol IV 2 mg was given, placed on restraints and his son was in the room.  His vital signs were blood pressure of 130/96, pulse 118, temperature 102.1, saturation of oxygen 100% on room air.  CT scan of the head was done and no acute intracranial findings. [facility] x-ray, trace bilateral pleural effusion and mild patchy bibasilar airspace opacities which may represent atelectasis, and the patient referred to hospitalist service for further evaluation and admission.  [address] [place] [address] COURSE     Delirium, Acute metabolic encephalopathy with underlying dementia  -CT head no acute intracranial pathology   -patient is conscious and alert, disoriented, restless and agitated  -started seroquel 50 mg q hs, prn haldol and ativan  -EKG normal sinus rhythm vent rate 71 bpm non specific st t wave  -continue neuro check and supportive care   -seen by psychiatrist , improved   -off soft restrains > 24 hours. -will dc back to memory care unit, continue seroquel HS      UTI with sepsis POA  -fever, tachycardia, change in mental status improved now.   -continue IV levaquin, dc ivf 4/28   -urine culture grow proteus Mirabilis, sensitive to levaquin  -blood cx no growth so far  -patient allergic to PCN  -dc with po levaquin for 2 more doses      Possible pneumonia, Influenza A infection  -on iv levaquin, Tamiflu and IVF  -chest x ray trace bilateral pleural effusions and mild patchy bibasilar airspace opacities which may represent atelectasis. -continue levaquin   -afebrile no leukocytosis  -SpO2 90-94% on RA  -will continue tamiflu and levaquin po 2 more days      HTN  -BP BP normal, monitor BP     Hx of Paroxysmal atrial fibrillation, now NSR  -continue aspirin  -not on BB or CCB     Prepuce or foreskin meatus stricture.  -urologist is consulted  -Bladder scan 180 cc. Okay to continue to void independently at this time.   -urologist follow up as outpatient if needed      Hx of benign prostatic hyperplasia. -stable     Hx of dementia with agitation.  -on aricept, namenda, zoloft and  seroquel     Dyslipidemia.  -can resume zocor on discharge           DISCHARGE ORDERS:      1. FALL Precaution, safety watch    2. PT/OT  3. 2 more days of oral abx and tamiflu   4.  Outpatient urologist follow up        PENDING TEST RESULTS:   At the time of discharge the following test results are still pending: none     FOLLOW UP APPOINTMENTS:    Follow-up Information     Follow up With Specialties Details Why Contact Info [facility]   Follow up with Dr. Katherine Abarca on 5/12/22 at 12:50 [phone]. Please bring insurance card with patient  [address] [facility] 200 [facility],[address] [facility] Today HHPT/OT @ [facility] [address] Kaley Miranda [id number]  Jeni Thomas MD Internal Medicine   [address] [place] [address] [phone]                 DIET: Cardiac Diet          ACTIVITY: Activity as tolerated        EQUIPMENT needed: RW       DISCHARGE MEDICATIONS:   See Medication Reconciliation Form      NOTIFY YOUR PHYSICIAN FOR ANY OF THE FOLLOWING:   Fever over 101 degrees for 24 hours. Chest pain, shortness of breath, fever, chills, nausea, vomiting, diarrhea, change in mentation, falling, weakness, bleeding. Severe pain or pain not relieved by medications. Or, any other signs or symptoms that you may have questions about. DISPOSITION:    Home With:   OT x PT x HH x RNx       SNF/Inpatient Rehab/LTAC    Independent/assisted living    Hospice    Other:       PATIENT CONDITION AT DISCHARGE:     xFunctional status    Poor     Deconditioned     Independent      Cognition     Lucid     Forgetful    x Dementia      Catheters/lines (plus indication)    Gonzales     PICC    x PEG     None      Code status     Full code    x DNR      PHYSICAL EXAMINATION AT DISCHARGE:  04/29/22 1207 97.5 °F (36.4 °C) 65 120/67 85  At rest 18 95 % None (Room air)      04/29/22 1200  [facility]       04/29/22 1000  59 Abnormal                  Constitutional:  No acute distress,demented, answer questions    ENT:  Oral mucosa moist, oropharynx benign. Resp:  CTA bilaterally. No wheezing/rhonchi/rales. No accessory muscle use. CV:  Regular rhythm, normal rate, no murmurs, gallops, rubs    GI:  Soft, non distended, non tender.  normoactive bowel sounds, no hepatosplenomegaly     Musculoskeletal:  No edema,     Neurologic:  Conscious and alert,  disoriented, moves all extremities          CHRONIC MEDICAL DIAGNOSES:  Problem List as of 4/29/2022 Date Reviewed: 1/31/2017          Codes Class Noted - Resolved    Fever ICD-10-CM: R50.9  ICD-9-CM: 780.60  4/25/2022 - Present        Memory changes ICD-10-CM: R41.3  ICD-9-CM: 780.93  8/20/2015 - Present        Macular degeneration ICD-10-CM: H35.30  ICD-9-CM: 362.50  2/20/2014 - Present        BPH (benign prostatic hyperplasia) ICD-10-CM: N40.0  ICD-9-CM: 600.00  10/9/2013 - Present        GERD (gastroesophageal reflux disease) ICD-10-CM: K21.9  ICD-9-CM: 530.81  1/25/2012 - Present        DJD (degenerative joint disease) ICD-10-CM: M19.90  ICD-9-CM: 715.90  9/15/2011 - Present        HTN (hypertension) ICD-10-CM: I10  ICD-9-CM: 401.9  7/20/2011 - Present        PAF (paroxysmal atrial fibrillation) ([facility] ICD-10-CM: I48.0  ICD-9-CM: 427.31  7/20/2011 - Present        Hyperlipidemia ICD-10-CM: E78.5  ICD-9-CM: 272.4  7/20/2011 - Present        Glucose intolerance (impaired glucose tolerance) ICD-10-CM: R73.02  ICD-9-CM: 790.22  7/20/2011 - Present                  DISCHARGE MEDICATIONS:  Current Discharge Medication List      START taking these medications    Details   aspirin 81 mg chewable tablet Take 1 Tablet by mouth daily. Start date: 4/30/2022      levoFLOXacin (LEVAQUIN) 750 mg tablet Take 1 Tablet by mouth every twenty-four (24) hours for 2 doses. Qty: 2 Tablet, Refills: 0  Start date: 4/29/2022, End date: 5/1/2022      oseltamivir (TAMIFLU) 75 mg capsule Take 1 Capsule by mouth every twelve (12) hours for 5 doses. Qty: 5 Capsule, Refills: 0  Start date: 4/29/2022, End date: 5/2/2022      QUEtiapine (SEROquel) 50 mg tablet Take 1 Tablet by mouth nightly. Qty: 30 Tablet, Refills: 0  Start date: 4/29/2022         CONTINUE these medications which have NOT CHANGED    Details   memantine (Namenda) 10 mg tablet Take 10 mg by mouth two (2) times a day.       senna (Senna) 8.6 mg tablet Take 2 Tablets by mouth every six (6) hours as needed for Constipation. Indications: constipation      acetaminophen (TylenoL) 325 mg tablet Take 325 mg by mouth every four (4) hours as needed for Pain. Indications: fever, pain      vitamin a & d (A&D) ointment Apply  to affected area two (2) times a day. Apply to sacrum      sertraline (ZOLOFT) 50 mg tablet Days 1-7, take 25 mg daily. Starting day 8 and thereafter, 50 mg daily  Qty: 30 Tab, Refills: 5      donepezil (ARICEPT) 10 mg tablet Take 1 Tab by mouth daily (after dinner).   Qty: 30 Tab, Refills: 0      simvastatin (ZOCOR) 5 mg tablet TAKE ONE (1) TABLET(S) DAILY AT BEDTIME  Qty: 90 Tab, Refills: 3    Associated Diagnoses: Bleeding; Memory difficulty; Encounter for long-term (current) drug use             Greater than 30  minutes were spent with the patient on counseling and coordination of care    Signed:   Clint Pollard MD  4/29/2022  12:54 PM

## 2022-04-29 NOTE — PROGRESS NOTES
· [facility]: anticipate return to long term  [facility] - patient accepted. · Follow up with PCP & Specialist   · Home with home health -[address] [place] [address]  [phone] - has accepted patient. · [facility] transport requested for 3pm  · Primary contact: Carla Correia [phone]    Pt is on droplet isolation for Influenza    Pt needs a Negative Rapid Covid prior to return to [facility]    RUR: 10%    -1200-CM discussed pt case in morning rounds and as per Attending pt is medically stable for d/c. CM spoke with Riddhi Camarenaaustinjcakelyn from [address] [place] [address] [phone] and she advised that they can accept pt back today and that pt has a private room, so pt having flu is something that they can accommodate. CM sent perfect serve to Attending to inform of this and also to PT to check if pt's needs a RW for d/c. CM to follow.  -1300-CM confirmed with PT that pt would not need a RW since he does not have enough cognitive insight to properly utilize it. CM spoke with pt's son, Bjorn Powell and he is agreeable to d/c plan and pt's return to prior long term. Son stated that family cannot provide transport, so CM scheduled [facility] transport as noted above. 1400-CM spoke with Charleen Spaulding of [address] [place] [address] [phone] and he confirmed that they can accept pt at any time today, however pt would need a negative rapid covid test within 48 hours. CM noted [facility] transport would be 6pm. CM contacted [employer] response   Shireen Lang RN BSN CCM Medicare pt has received, reviewed, and signed 2nd IM letter informing them of their right to appeal the discharge. Signed copy has been placed on pt bedside chart.

## 2022-04-29 NOTE — PROGRESS NOTES
Bedside shift change report given to [address]  (oncoming nurse) by Liberty Agarwal RN  (offgoing nurse). Report included the following information SBAR, Kardex, MAR and Recent Results.

## 2022-04-29 NOTE — DISCHARGE INSTRUCTIONS
Discharge SNF/Rehab Instructions/LTAC       PATIENT ID: Ti Lott  MRN: [id number]   YOB: 1933    DATE OF ADMISSION: 4/25/2022  7:11 AM    DATE OF DISCHARGE: 4/29/2022    PRIMARY CARE PROVIDER: All Larios MD       ATTENDING PHYSICIAN: Dori Santos MD  DISCHARGING PROVIDER: Audrey Cota MD     To contact this individual call [phone] and ask the  to page. If unavailable ask to be transferred the Adult Hospitalist Department. CONSULTATIONS: IP CONSULT TO PSYCHIATRY  IP CONSULT TO UROLOGY  IP CONSULT TO PSYCHIATRY    PROCEDURES/SURGERIES: * No surgery found *    ADMITTING SUMMARY  This is an 19-year-old  male with past medical history significant for memory impairment, paroxysmal AFib, hypertension, dyslipidemia, right bundle branch block, lumbar spinal stenosis, gastroesophageal reflux disease, and BPH, who is transferred from the nursing home after he became altered with agitation and restless.  He had also fever of 103. It was unable to get detailed information from him. [facility] son was at the bedside. Abner Israel stated that the last time he saw him was on 04/21/2022. Terrence Guajardove that time, he was relatively stable but he had on and off confusion, agitation and restlessness then worse for the last couple of days. He said his Zoloft dose recently reduced. No cough.  Patient denies cough, left-sided chest pain, nausea, vomiting, or abdominal pain.  The patient was restless, agitated, and combative in ER, Haldol IV 2 mg was given, placed on restraints and his son was in the room.  His vital signs were blood pressure of 130/96, pulse 118, temperature 102.1, saturation of oxygen 100% on room air.  CT scan of the head was done and no acute intracranial findings. [facility] x-ray, trace bilateral pleural effusion and mild patchy bibasilar airspace opacities which may represent atelectasis, and the patient referred to hospitalist service for further evaluation and admission.     DISCHARGE [address] [facility]. COURSE     Delirium, Acute metabolic encephalopathy with underlying dementia  -CT head no acute intracranial pathology   -patient is conscious and alert, disoriented, restless and agitated  -started seroquel 50 mg q hs, prn haldol and ativan  -EKG normal sinus rhythm vent rate 71 bpm non specific st t wave  -continue neuro check and supportive care   -seen by psychiatrist , improved   -off soft restrains > 24 hours. -will dc back to memory care unit, continue seroquel HS      UTI with sepsis POA  -fever, tachycardia, change in mental status improved now.   -continue IV levaquin, dc ivf 4/28   -urine culture grow proteus Mirabilis, sensitive to levaquin  -blood cx no growth so far  -patient allergic to PCN  -dc with po levaquin for 2 more doses      Possible pneumonia, Influenza A infection  -on iv levaquin, Tamiflu and IVF  -chest x ray trace bilateral pleural effusions and mild patchy bibasilar airspace opacities which may represent atelectasis. -continue levaquin   -afebrile no leukocytosis  -SpO2 90-94% on RA  -will continue tamiflu and levaquin po 2 more days      HTN  -BP BP normal, monitor BP     Hx of Paroxysmal atrial fibrillation, now NSR  -continue aspirin  -not on BB or CCB     Prepuce or foreskin meatus stricture.  -urologist is consulted  -Bladder scan 180 cc. Okay to continue to void independently at this time.   -urologist follow up as outpatient if needed      Hx of benign prostatic hyperplasia. -stable     Hx of dementia with agitation.  -on aricept, namenda, zoloft and  seroquel     Dyslipidemia.  -can resume zocor on discharge           DISCHARGE ORDERS:      1. FALL Precaution, safety watch    2. PT/OT  3. 2 more days of oral abx and tamiflu   4.  Outpatient urologist follow up        PENDING TEST RESULTS:   At the time of discharge the following test results are still pending: none     FOLLOW UP APPOINTMENTS:    Follow-up Information     Follow up With Specialties Details Why [facility]   Follow up with Dr. Michelle Becerra on 5/12/22 at 12:50 [phone]. Please bring insurance card with patient  [address] [facility] 200 [facility],[address] Today HHPT/OT @ [facility] [address] [place] [address]  Jeni Thomas MD Internal Medicine   [address] [place] [address] [phone]                 DIET: Cardiac Diet          ACTIVITY: Activity as tolerated        EQUIPMENT needed: RW       DISCHARGE MEDICATIONS:   See Medication Reconciliation Form      NOTIFY YOUR PHYSICIAN FOR ANY OF THE FOLLOWING:   Fever over 101 degrees for 24 hours. Chest pain, shortness of breath, fever, chills, nausea, vomiting, diarrhea, change in mentation, falling, weakness, bleeding. Severe pain or pain not relieved by medications. Or, any other signs or symptoms that you may have questions about. DISPOSITION:    Home With:   OT x PT x HH x RNx       SNF/Inpatient Rehab/LTAC    Independent/assisted living    Hospice    Other:       PATIENT CONDITION AT DISCHARGE:     xFunctional status    Poor     Deconditioned     Independent      Cognition     Lucid     Forgetful    x Dementia      Catheters/lines (plus indication)    Gonzales     PICC    x PEG     None      Code status     Full code    x DNR      PHYSICAL EXAMINATION AT DISCHARGE:  04/29/22 1207 97.5 °F (36.4 °C) 65 120/67 85 -- At rest 18 95 % None (Room air) -- -- --   04/29/22 1200 -- 60 -- -- -- -- -- -- -- -- -- --   04/29/22 1000 -- 59 Abnormal  -- -- -- -- -- --           Constitutional:  No acute distress,demented, answer questions    ENT:  Oral mucosa moist, oropharynx benign. Resp:  CTA bilaterally. No wheezing/rhonchi/rales. No accessory muscle use. CV:  Regular rhythm, normal rate, no murmurs, gallops, rubs    GI:  Soft, non distended, non tender.  normoactive bowel sounds, no hepatosplenomegaly     Musculoskeletal:  No edema, Neurologic:  Conscious and alert,  disoriented, moves all extremities          CHRONIC MEDICAL DIAGNOSES:  Problem List as of 4/29/2022 Date Reviewed: 1/31/2017          Codes Class Noted - Resolved    Fever ICD-10-CM: R50.9  ICD-9-CM: 780.60  4/25/2022 - Present        Memory changes ICD-10-CM: R41.3  ICD-9-CM: 780.93  8/20/2015 - Present        Macular degeneration ICD-10-CM: H35.30  ICD-9-CM: 362.50  2/20/2014 - Present        BPH (benign prostatic hyperplasia) ICD-10-CM: N40.0  ICD-9-CM: 600.00  10/9/2013 - Present        GERD (gastroesophageal reflux disease) ICD-10-CM: K21.9  ICD-9-CM: 530.81  1/25/2012 - Present        DJD (degenerative joint disease) ICD-10-CM: M19.90  ICD-9-CM: 715.90  9/15/2011 - Present        HTN (hypertension) ICD-10-CM: I10  ICD-9-CM: 401.9  7/20/2011 - Present        PAF (paroxysmal atrial fibrillation) ([facility] ICD-10-CM: I48.0  ICD-9-CM: 427.31  7/20/2011 - Present        Hyperlipidemia ICD-10-CM: E78.5  ICD-9-CM: 272.4  7/20/2011 - Present        Glucose intolerance (impaired glucose tolerance) ICD-10-CM: R73.02  ICD-9-CM: 790.22  7/20/2011 - Present                CDMP Checked:   Yes x     PROBLEM LIST Updated:  Yes x         Signed:   Ana Rosa Garza MD  4/29/2022  12:46 PM

## 2022-04-29 NOTE — PROGRESS NOTES
Problem: Mobility Impaired (Adult and Pediatric)  Goal: *Acute Goals and Plan of Care (Insert Text)  Description: FUNCTIONAL STATUS PRIOR TO ADMISSION: Pt poor historian, per conversation with sons, pt resident of memory care and was I with ambulation without AD PTA. Pt has hx of falls per reports from staff. HOME SUPPORT PRIOR TO ADMISSION: Per conversation with sons, pt required staff to assist with ADLs however the level of support is unclear. Physical Therapy Goals  Initiated 4/26/2022  1. Patient will move from supine to sit and sit to supine  in bed with modified independence within 7 day(s). 2.  Patient will transfer from bed to chair and chair to bed with modified independence using the least restrictive device within 7 day(s). 3.  Patient will perform sit to stand with modified independence within 7 day(s). 4.  Patient will ambulate with modified independence for 200 feet with the least restrictive device within 7 day(s). Outcome: Progressing Towards Goal     PHYSICAL THERAPY TREATMENT  Patient: Zachary Silvestre (5 y.o. male)  Date: 4/29/2022  Diagnosis: Fever [R50.9] <principal problem not specified>       Precautions: Fall,Skin,Aspiration  Chart, physical therapy assessment, plan of care and goals were reviewed. ASSESSMENT  Patient continues with skilled PT services and is progressing towards goals. Pt generally mobilized at a CGA to Max A level during today's session. Pt received supine in bed, sleeping, son in room. Pt easily awoken and agreeable to work with pt. Pt noted decreased orientation during this session with pt consistently talking about tangential topics during session. Pt went supine>sit and displayed strong posterior lean at times requiring Mod A to correct. With verbal and tactile cues, pt able to correct and sit for short periods of time unsupported however pt would lose attention task and require Min A to correct. Pt then attempted 3x sit>stands.  Initial stand, pt remained on heels and with strong posterior lean and unable to correct. Pt then returned to sitting, educated on proper mechanics and attempted tranfers this time standing calmly with CGA maintaining balance. Pt returned to supine and attempted transfer final time however pt unable to reach full stand and pt returned to sit and then supine and session concluded. Pt inability to focus on task effecting ability to ambulate at this time. Pt would likely benefit from education and implementation of RW for increased stability however given pt's poor attention and little to no carryover from previous sessions, addition of AD would likely contribute to increased fall risk due to inability to properly sequence utilization and thus pt requires no additional DME for d/c. Current Level of Function Impacting Discharge (mobility/balance): Min A supine>sit, sit>stand, limited ambulation    Other factors to consider for discharge: admitted from memory care, poor caryover         PLAN :  Patient continues to benefit from skilled intervention to address the above impairments. Continue treatment per established plan of care. to address goals. Recommendation for discharge: (in order for the patient to meet his/her long term goals)  Physical therapy at least 2 days/week in the home     This discharge recommendation:  Has been made in collaboration with the attending provider and/or case management    IF patient discharges home will need the following DME: none       SUBJECTIVE:   Patient stated ok.     OBJECTIVE DATA SUMMARY:   Critical Behavior:  Neurologic State: Alert,Confused,Restless  Orientation Level: Oriented to person  Cognition: Decreased attention/concentration,Decreased command following,Impaired decision making,Impulsive,Poor safety awareness     Functional Mobility Training:  Bed Mobility:  Rolling: Contact guard assistance  Supine to Sit: Minimum assistance  Sit to Supine: Contact guard assistance  Scooting: Maximum assistance        Transfers:  Sit to Stand: Maximum assistance;Minimum assistance  Stand to Sit: Moderate assistance;Contact guard assistance                             Balance:  Sitting: Impaired; With support  Sitting - Static: Fair (occasional)  Sitting - Dynamic: Occassional  Standing: Impaired; With support  Standing - Static: Poor;Constant support  Standing - Dynamic : Poor;Constant support    Pain Rating:  Pt did not relate pain during session    Activity Tolerance:   Fair, tolerates ADLs without rest breaks, and SpO2 stable on RA    After treatment patient left in no apparent distress:   Supine in bed, Call bell within reach, Bed / chair alarm activated, Caregiver / family present, and Side rails x 3    COMMUNICATION/COLLABORATION:   The patients plan of care was discussed with: Registered nurse and Case management.      Ronny Perez, PT   Time Calculation: 30 mins

## 2022-04-30 LAB
BACTERIA SPEC CULT: NORMAL
SERVICE CMNT-IMP: NORMAL

## 2023-01-01 ENCOUNTER — APPOINTMENT (OUTPATIENT)
Dept: ULTRASOUND IMAGING | Age: 88
DRG: 689 | End: 2023-01-01
Attending: INTERNAL MEDICINE
Payer: MEDICARE

## 2023-01-01 ENCOUNTER — PATIENT OUTREACH (OUTPATIENT)
Dept: CASE MANAGEMENT | Age: 88
End: 2023-01-01

## 2023-01-01 ENCOUNTER — HOSPITAL ENCOUNTER (INPATIENT)
Age: 88
LOS: 3 days | End: 2023-03-12
Attending: FAMILY MEDICINE | Admitting: FAMILY MEDICINE

## 2023-01-01 ENCOUNTER — HOME CARE VISIT (OUTPATIENT)
Dept: SCHEDULING | Facility: HOME HEALTH | Age: 88
End: 2023-01-01
Payer: MEDICARE

## 2023-01-01 ENCOUNTER — APPOINTMENT (OUTPATIENT)
Dept: CT IMAGING | Age: 88
DRG: 689 | End: 2023-01-01
Attending: FAMILY MEDICINE
Payer: MEDICARE

## 2023-01-01 ENCOUNTER — HOSPITAL ENCOUNTER (INPATIENT)
Age: 88
LOS: 14 days | Discharge: HOME HEALTH | DRG: 689 | End: 2023-03-07
Attending: EMERGENCY MEDICINE | Admitting: FAMILY MEDICINE
Payer: MEDICARE

## 2023-01-01 ENCOUNTER — HOME CARE VISIT (OUTPATIENT)
Dept: HOSPICE | Facility: HOSPICE | Age: 88
End: 2023-01-01
Payer: MEDICARE

## 2023-01-01 ENCOUNTER — HOSPICE ADMISSION (OUTPATIENT)
Dept: HOSPICE | Facility: HOSPICE | Age: 88
End: 2023-01-01
Payer: MEDICARE

## 2023-01-01 VITALS
HEIGHT: 72 IN | DIASTOLIC BLOOD PRESSURE: 73 MMHG | BODY MASS INDEX: 23.38 KG/M2 | WEIGHT: 172.6 LBS | HEART RATE: 80 BPM | RESPIRATION RATE: 18 BRPM | SYSTOLIC BLOOD PRESSURE: 109 MMHG | OXYGEN SATURATION: 95 % | TEMPERATURE: 97.6 F

## 2023-01-01 VITALS
DIASTOLIC BLOOD PRESSURE: 52 MMHG | SYSTOLIC BLOOD PRESSURE: 118 MMHG | HEART RATE: 90 BPM | RESPIRATION RATE: 16 BRPM | TEMPERATURE: 97.9 F

## 2023-01-01 VITALS
SYSTOLIC BLOOD PRESSURE: 100 MMHG | TEMPERATURE: 98.9 F | WEIGHT: 166.4 LBS | DIASTOLIC BLOOD PRESSURE: 60 MMHG | HEART RATE: 92 BPM | RESPIRATION RATE: 18 BRPM | BODY MASS INDEX: 22.57 KG/M2

## 2023-01-01 VITALS
RESPIRATION RATE: 12 BRPM | DIASTOLIC BLOOD PRESSURE: 58 MMHG | SYSTOLIC BLOOD PRESSURE: 84 MMHG | HEART RATE: 88 BPM | TEMPERATURE: 98.3 F

## 2023-01-01 VITALS
RESPIRATION RATE: 12 BRPM | DIASTOLIC BLOOD PRESSURE: 56 MMHG | SYSTOLIC BLOOD PRESSURE: 100 MMHG | HEART RATE: 76 BPM | OXYGEN SATURATION: 79 % | TEMPERATURE: 98 F

## 2023-01-01 DIAGNOSIS — R45.1 RESTLESSNESS AND AGITATION: Primary | ICD-10-CM

## 2023-01-01 DIAGNOSIS — R52 NONVERBAL SIGNS OF PAIN: ICD-10-CM

## 2023-01-01 DIAGNOSIS — F01.C18 SEVERE VASCULAR DEMENTIA WITH OTHER BEHAVIORAL DISTURBANCE: ICD-10-CM

## 2023-01-01 DIAGNOSIS — Z51.5 HOSPICE CARE: ICD-10-CM

## 2023-01-01 DIAGNOSIS — G93.41 ACUTE METABOLIC ENCEPHALOPATHY: Primary | ICD-10-CM

## 2023-01-01 DIAGNOSIS — N39.0 URINARY TRACT INFECTION WITH HEMATURIA, SITE UNSPECIFIED: ICD-10-CM

## 2023-01-01 DIAGNOSIS — R31.9 URINARY TRACT INFECTION WITH HEMATURIA, SITE UNSPECIFIED: ICD-10-CM

## 2023-01-01 LAB
ALBUMIN SERPL-MCNC: 3.9 G/DL (ref 3.5–5)
ALBUMIN/GLOB SERPL: 1.2 (ref 1.1–2.2)
ALP SERPL-CCNC: 110 U/L (ref 45–117)
ALT SERPL-CCNC: 21 U/L (ref 12–78)
ANION GAP SERPL CALC-SCNC: 3 MMOL/L (ref 5–15)
ANION GAP SERPL CALC-SCNC: 5 MMOL/L (ref 5–15)
ANION GAP SERPL CALC-SCNC: 6 MMOL/L (ref 5–15)
ANION GAP SERPL CALC-SCNC: 8 MMOL/L (ref 5–15)
APPEARANCE UR: CLEAR
AST SERPL-CCNC: 22 U/L (ref 15–37)
ATRIAL RATE: 74 BPM
BACTERIA SPEC CULT: NORMAL
BACTERIA URNS QL MICRO: NEGATIVE /HPF
BASOPHILS # BLD: 0 K/UL (ref 0–0.1)
BASOPHILS # BLD: 0.1 K/UL (ref 0–0.1)
BASOPHILS NFR BLD: 0 % (ref 0–1)
BASOPHILS NFR BLD: 1 % (ref 0–1)
BILIRUB SERPL-MCNC: 0.4 MG/DL (ref 0.2–1)
BILIRUB UR QL: NEGATIVE
BUN SERPL-MCNC: 14 MG/DL (ref 6–20)
BUN SERPL-MCNC: 17 MG/DL (ref 6–20)
BUN SERPL-MCNC: 17 MG/DL (ref 6–20)
BUN SERPL-MCNC: 18 MG/DL (ref 6–20)
BUN/CREAT SERPL: 20 (ref 12–20)
BUN/CREAT SERPL: 20 (ref 12–20)
BUN/CREAT SERPL: 21 (ref 12–20)
BUN/CREAT SERPL: 24 (ref 12–20)
CALCIUM SERPL-MCNC: 8.7 MG/DL (ref 8.5–10.1)
CALCIUM SERPL-MCNC: 8.8 MG/DL (ref 8.5–10.1)
CALCIUM SERPL-MCNC: 9 MG/DL (ref 8.5–10.1)
CALCIUM SERPL-MCNC: 9 MG/DL (ref 8.5–10.1)
CALCULATED P AXIS, ECG09: 68 DEGREES
CALCULATED R AXIS, ECG10: 82 DEGREES
CALCULATED T AXIS, ECG11: 48 DEGREES
CHLORIDE SERPL-SCNC: 105 MMOL/L (ref 97–108)
CHLORIDE SERPL-SCNC: 105 MMOL/L (ref 97–108)
CHLORIDE SERPL-SCNC: 107 MMOL/L (ref 97–108)
CHLORIDE SERPL-SCNC: 108 MMOL/L (ref 97–108)
CO2 SERPL-SCNC: 25 MMOL/L (ref 21–32)
CO2 SERPL-SCNC: 28 MMOL/L (ref 21–32)
CO2 SERPL-SCNC: 28 MMOL/L (ref 21–32)
CO2 SERPL-SCNC: 31 MMOL/L (ref 21–32)
COLOR UR: ABNORMAL
COMMENT, HOLDF: NORMAL
COMMENT, HOLDF: NORMAL
CREAT SERPL-MCNC: 0.71 MG/DL (ref 0.7–1.3)
CREAT SERPL-MCNC: 0.75 MG/DL (ref 0.7–1.3)
CREAT SERPL-MCNC: 0.81 MG/DL (ref 0.7–1.3)
CREAT SERPL-MCNC: 0.83 MG/DL (ref 0.7–1.3)
DIAGNOSIS, 93000: NORMAL
DIFFERENTIAL METHOD BLD: ABNORMAL
DIFFERENTIAL METHOD BLD: NORMAL
EOSINOPHIL # BLD: 0.3 K/UL (ref 0–0.4)
EOSINOPHIL # BLD: 0.3 K/UL (ref 0–0.4)
EOSINOPHIL # BLD: 0.4 K/UL (ref 0–0.4)
EOSINOPHIL # BLD: 0.5 K/UL (ref 0–0.4)
EOSINOPHIL NFR BLD: 3 % (ref 0–7)
EOSINOPHIL NFR BLD: 5 % (ref 0–7)
EPITH CASTS URNS QL MICRO: ABNORMAL /LPF
ERYTHROCYTE [DISTWIDTH] IN BLOOD BY AUTOMATED COUNT: 12.8 % (ref 11.5–14.5)
ERYTHROCYTE [DISTWIDTH] IN BLOOD BY AUTOMATED COUNT: 12.9 % (ref 11.5–14.5)
ERYTHROCYTE [DISTWIDTH] IN BLOOD BY AUTOMATED COUNT: 13 % (ref 11.5–14.5)
ERYTHROCYTE [DISTWIDTH] IN BLOOD BY AUTOMATED COUNT: 13.1 % (ref 11.5–14.5)
GLOBULIN SER CALC-MCNC: 3.3 G/DL (ref 2–4)
GLUCOSE SERPL-MCNC: 132 MG/DL (ref 65–100)
GLUCOSE SERPL-MCNC: 88 MG/DL (ref 65–100)
GLUCOSE SERPL-MCNC: 89 MG/DL (ref 65–100)
GLUCOSE SERPL-MCNC: 90 MG/DL (ref 65–100)
GLUCOSE UR STRIP.AUTO-MCNC: NEGATIVE MG/DL
HCT VFR BLD AUTO: 38.8 % (ref 36.6–50.3)
HCT VFR BLD AUTO: 39.5 % (ref 36.6–50.3)
HCT VFR BLD AUTO: 42.3 % (ref 36.6–50.3)
HCT VFR BLD AUTO: 44.3 % (ref 36.6–50.3)
HGB BLD-MCNC: 12.2 G/DL (ref 12.1–17)
HGB BLD-MCNC: 12.9 G/DL (ref 12.1–17)
HGB BLD-MCNC: 13.1 G/DL (ref 12.1–17)
HGB BLD-MCNC: 13.9 G/DL (ref 12.1–17)
HGB UR QL STRIP: NEGATIVE
HYALINE CASTS URNS QL MICRO: ABNORMAL /LPF (ref 0–5)
IMM GRANULOCYTES # BLD AUTO: 0 K/UL (ref 0–0.04)
IMM GRANULOCYTES NFR BLD AUTO: 0 % (ref 0–0.5)
KETONES UR QL STRIP.AUTO: NEGATIVE MG/DL
LEUKOCYTE ESTERASE UR QL STRIP.AUTO: ABNORMAL
LYMPHOCYTES # BLD: 1.6 K/UL (ref 0.8–3.5)
LYMPHOCYTES # BLD: 2.2 K/UL (ref 0.8–3.5)
LYMPHOCYTES # BLD: 2.3 K/UL (ref 0.8–3.5)
LYMPHOCYTES # BLD: 2.8 K/UL (ref 0.8–3.5)
LYMPHOCYTES NFR BLD: 18 % (ref 12–49)
LYMPHOCYTES NFR BLD: 19 % (ref 12–49)
LYMPHOCYTES NFR BLD: 20 % (ref 12–49)
LYMPHOCYTES NFR BLD: 26 % (ref 12–49)
MCH RBC QN AUTO: 29.3 PG (ref 26–34)
MCH RBC QN AUTO: 29.8 PG (ref 26–34)
MCH RBC QN AUTO: 29.8 PG (ref 26–34)
MCH RBC QN AUTO: 30.3 PG (ref 26–34)
MCHC RBC AUTO-ENTMCNC: 31 G/DL (ref 30–36.5)
MCHC RBC AUTO-ENTMCNC: 31.4 G/DL (ref 30–36.5)
MCHC RBC AUTO-ENTMCNC: 31.4 G/DL (ref 30–36.5)
MCHC RBC AUTO-ENTMCNC: 32.7 G/DL (ref 30–36.5)
MCV RBC AUTO: 92.7 FL (ref 80–99)
MCV RBC AUTO: 93.5 FL (ref 80–99)
MCV RBC AUTO: 94.6 FL (ref 80–99)
MCV RBC AUTO: 96.1 FL (ref 80–99)
MONOCYTES # BLD: 0.6 K/UL (ref 0–1)
MONOCYTES # BLD: 1 K/UL (ref 0–1)
MONOCYTES # BLD: 1.2 K/UL (ref 0–1)
MONOCYTES # BLD: 1.2 K/UL (ref 0–1)
MONOCYTES NFR BLD: 10 % (ref 5–13)
MONOCYTES NFR BLD: 11 % (ref 5–13)
MONOCYTES NFR BLD: 7 % (ref 5–13)
MONOCYTES NFR BLD: 9 % (ref 5–13)
NEUTS SEG # BLD: 6.2 K/UL (ref 1.8–8)
NEUTS SEG # BLD: 6.4 K/UL (ref 1.8–8)
NEUTS SEG # BLD: 7.8 K/UL (ref 1.8–8)
NEUTS SEG # BLD: 7.9 K/UL (ref 1.8–8)
NEUTS SEG NFR BLD: 57 % (ref 32–75)
NEUTS SEG NFR BLD: 67 % (ref 32–75)
NEUTS SEG NFR BLD: 67 % (ref 32–75)
NEUTS SEG NFR BLD: 72 % (ref 32–75)
NITRITE UR QL STRIP.AUTO: NEGATIVE
NRBC # BLD: 0 K/UL (ref 0–0.01)
NRBC BLD-RTO: 0 PER 100 WBC
P-R INTERVAL, ECG05: 142 MS
PH UR STRIP: 7 (ref 5–8)
PLATELET # BLD AUTO: 223 K/UL (ref 150–400)
PLATELET # BLD AUTO: 236 K/UL (ref 150–400)
PLATELET # BLD AUTO: 247 K/UL (ref 150–400)
PLATELET # BLD AUTO: 251 K/UL (ref 150–400)
PMV BLD AUTO: 10.2 FL (ref 8.9–12.9)
PMV BLD AUTO: 9.7 FL (ref 8.9–12.9)
PMV BLD AUTO: 9.9 FL (ref 8.9–12.9)
PMV BLD AUTO: 9.9 FL (ref 8.9–12.9)
POTASSIUM SERPL-SCNC: 3.8 MMOL/L (ref 3.5–5.1)
POTASSIUM SERPL-SCNC: 4 MMOL/L (ref 3.5–5.1)
POTASSIUM SERPL-SCNC: 4.1 MMOL/L (ref 3.5–5.1)
POTASSIUM SERPL-SCNC: 4.4 MMOL/L (ref 3.5–5.1)
PROT SERPL-MCNC: 7.2 G/DL (ref 6.4–8.2)
PROT UR STRIP-MCNC: NEGATIVE MG/DL
Q-T INTERVAL, ECG07: 400 MS
QRS DURATION, ECG06: 122 MS
QTC CALCULATION (BEZET), ECG08: 444 MS
RBC # BLD AUTO: 4.1 M/UL (ref 4.1–5.7)
RBC # BLD AUTO: 4.26 M/UL (ref 4.1–5.7)
RBC # BLD AUTO: 4.4 M/UL (ref 4.1–5.7)
RBC # BLD AUTO: 4.74 M/UL (ref 4.1–5.7)
RBC #/AREA URNS HPF: ABNORMAL /HPF (ref 0–5)
SAMPLES BEING HELD,HOLD: NORMAL
SAMPLES BEING HELD,HOLD: NORMAL
SARS-COV-2 RDRP RESP QL NAA+PROBE: NOT DETECTED
SERVICE CMNT-IMP: NORMAL
SODIUM SERPL-SCNC: 139 MMOL/L (ref 136–145)
SODIUM SERPL-SCNC: 139 MMOL/L (ref 136–145)
SODIUM SERPL-SCNC: 140 MMOL/L (ref 136–145)
SODIUM SERPL-SCNC: 141 MMOL/L (ref 136–145)
SOURCE, COVRS: NORMAL
SP GR UR REFRACTOMETRY: 1.01 (ref 1–1.03)
UR CULT HOLD, URHOLD: NORMAL
UROBILINOGEN UR QL STRIP.AUTO: 0.2 EU/DL (ref 0.2–1)
VENTRICULAR RATE, ECG03: 74 BPM
WBC # BLD AUTO: 10.8 K/UL (ref 4.1–11.1)
WBC # BLD AUTO: 11.6 K/UL (ref 4.1–11.1)
WBC # BLD AUTO: 11.8 K/UL (ref 4.1–11.1)
WBC # BLD AUTO: 8.8 K/UL (ref 4.1–11.1)
WBC URNS QL MICRO: ABNORMAL /HPF (ref 0–4)

## 2023-01-01 PROCEDURE — G0299 HHS/HOSPICE OF RN EA 15 MIN: HCPCS

## 2023-01-01 PROCEDURE — 85025 COMPLETE CBC W/AUTO DIFF WBC: CPT

## 2023-01-01 PROCEDURE — 74011250637 HC RX REV CODE- 250/637: Performed by: INTERNAL MEDICINE

## 2023-01-01 PROCEDURE — 0656 HSPC GENERAL INPATIENT

## 2023-01-01 PROCEDURE — 74011250637 HC RX REV CODE- 250/637: Performed by: PSYCHIATRY & NEUROLOGY

## 2023-01-01 PROCEDURE — 36415 COLL VENOUS BLD VENIPUNCTURE: CPT

## 2023-01-01 PROCEDURE — 65270000029 HC RM PRIVATE

## 2023-01-01 PROCEDURE — 74011250636 HC RX REV CODE- 250/636: Performed by: FAMILY MEDICINE

## 2023-01-01 PROCEDURE — 74011000250 HC RX REV CODE- 250: Performed by: FAMILY MEDICINE

## 2023-01-01 PROCEDURE — 74011250636 HC RX REV CODE- 250/636: Performed by: EMERGENCY MEDICINE

## 2023-01-01 PROCEDURE — 74011250637 HC RX REV CODE- 250/637: Performed by: FAMILY MEDICINE

## 2023-01-01 PROCEDURE — 80048 BASIC METABOLIC PNL TOTAL CA: CPT

## 2023-01-01 PROCEDURE — 74011250637 HC RX REV CODE- 250/637: Performed by: PHYSICIAN ASSISTANT

## 2023-01-01 PROCEDURE — 74011250636 HC RX REV CODE- 250/636: Performed by: INTERNAL MEDICINE

## 2023-01-01 PROCEDURE — 74011250637 HC RX REV CODE- 250/637: Performed by: NURSE PRACTITIONER

## 2023-01-01 PROCEDURE — 99232 SBSQ HOSP IP/OBS MODERATE 35: CPT | Performed by: FAMILY MEDICINE

## 2023-01-01 PROCEDURE — 74011250637 HC RX REV CODE- 250/637: Performed by: STUDENT IN AN ORGANIZED HEALTH CARE EDUCATION/TRAINING PROGRAM

## 2023-01-01 PROCEDURE — G0155 HHCP-SVS OF CSW,EA 15 MIN: HCPCS

## 2023-01-01 PROCEDURE — 70450 CT HEAD/BRAIN W/O DYE: CPT

## 2023-01-01 PROCEDURE — 65210000002 HC RM PRIVATE GYN

## 2023-01-01 PROCEDURE — HOSPICE MEDICATION HC HH HOSPICE MEDICATION

## 2023-01-01 PROCEDURE — 81001 URINALYSIS AUTO W/SCOPE: CPT

## 2023-01-01 PROCEDURE — 93005 ELECTROCARDIOGRAM TRACING: CPT

## 2023-01-01 PROCEDURE — G0156 HHCP-SVS OF AIDE,EA 15 MIN: HCPCS

## 2023-01-01 PROCEDURE — 76770 US EXAM ABDO BACK WALL COMP: CPT

## 2023-01-01 PROCEDURE — 65270000046 HC RM TELEMETRY

## 2023-01-01 PROCEDURE — 87635 SARS-COV-2 COVID-19 AMP PRB: CPT

## 2023-01-01 PROCEDURE — 99232 SBSQ HOSP IP/OBS MODERATE 35: CPT | Performed by: INTERNAL MEDICINE

## 2023-01-01 PROCEDURE — 87086 URINE CULTURE/COLONY COUNT: CPT

## 2023-01-01 PROCEDURE — 80053 COMPREHEN METABOLIC PANEL: CPT

## 2023-01-01 PROCEDURE — 97161 PT EVAL LOW COMPLEX 20 MIN: CPT

## 2023-01-01 PROCEDURE — 0651 HSPC ROUTINE HOME CARE

## 2023-01-01 PROCEDURE — 97116 GAIT TRAINING THERAPY: CPT

## 2023-01-01 PROCEDURE — 99285 EMERGENCY DEPT VISIT HI MDM: CPT

## 2023-01-01 PROCEDURE — 97530 THERAPEUTIC ACTIVITIES: CPT

## 2023-01-01 PROCEDURE — 99223 1ST HOSP IP/OBS HIGH 75: CPT | Performed by: FAMILY MEDICINE

## 2023-01-01 RX ORDER — DIAZEPAM 10 MG/2ML
5 INJECTION INTRAMUSCULAR
Status: COMPLETED | OUTPATIENT
Start: 2023-01-01 | End: 2023-01-01

## 2023-01-01 RX ORDER — TRAZODONE HYDROCHLORIDE 50 MG/1
25 TABLET ORAL 2 TIMES DAILY
Status: DISCONTINUED | OUTPATIENT
Start: 2023-01-01 | End: 2023-01-01 | Stop reason: HOSPADM

## 2023-01-01 RX ORDER — HALOPERIDOL 5 MG/ML
5 INJECTION INTRAMUSCULAR
Status: COMPLETED | OUTPATIENT
Start: 2023-01-01 | End: 2023-01-01

## 2023-01-01 RX ORDER — QUETIAPINE FUMARATE 50 MG/1
50 TABLET, FILM COATED ORAL 3 TIMES DAILY
Qty: 30 TABLET | Refills: 0 | Status: SHIPPED
Start: 2023-01-01

## 2023-01-01 RX ORDER — TRAZODONE HYDROCHLORIDE 50 MG/1
25 TABLET ORAL 2 TIMES DAILY
Qty: 30 TABLET | Refills: 0 | Status: SHIPPED
Start: 2023-01-01

## 2023-01-01 RX ORDER — DONEPEZIL HYDROCHLORIDE 10 MG/1
10 TABLET, FILM COATED ORAL
Status: DISCONTINUED | OUTPATIENT
Start: 2023-01-01 | End: 2023-01-01 | Stop reason: HOSPADM

## 2023-01-01 RX ORDER — QUETIAPINE FUMARATE 25 MG/1
25 TABLET, FILM COATED ORAL DAILY
Status: DISCONTINUED | OUTPATIENT
Start: 2023-01-01 | End: 2023-01-01

## 2023-01-01 RX ORDER — TRAZODONE HYDROCHLORIDE 50 MG/1
25 TABLET ORAL DAILY
Status: DISCONTINUED | OUTPATIENT
Start: 2023-01-01 | End: 2023-01-01

## 2023-01-01 RX ORDER — QUETIAPINE FUMARATE 25 MG/1
25 TABLET, FILM COATED ORAL
Status: DISCONTINUED | OUTPATIENT
Start: 2023-01-01 | End: 2023-01-01

## 2023-01-01 RX ORDER — POLYETHYLENE GLYCOL 3350 17 G/17G
17 POWDER, FOR SOLUTION ORAL DAILY PRN
Status: DISCONTINUED | OUTPATIENT
Start: 2023-01-01 | End: 2023-01-01 | Stop reason: HOSPADM

## 2023-01-01 RX ORDER — LANOLIN ALCOHOL/MO/W.PET/CERES
3 CREAM (GRAM) TOPICAL
Status: DISCONTINUED | OUTPATIENT
Start: 2023-01-01 | End: 2023-01-01 | Stop reason: HOSPADM

## 2023-01-01 RX ORDER — MORPHINE SULFATE 2 MG/ML
2 INJECTION, SOLUTION INTRAMUSCULAR; INTRAVENOUS
Status: DISCONTINUED | OUTPATIENT
Start: 2023-01-01 | End: 2023-01-01

## 2023-01-01 RX ORDER — ACETAMINOPHEN 325 MG/1
650 TABLET ORAL
Status: DISCONTINUED | OUTPATIENT
Start: 2023-01-01 | End: 2023-01-01 | Stop reason: HOSPADM

## 2023-01-01 RX ORDER — HALOPERIDOL 5 MG/ML
5 INJECTION INTRAMUSCULAR ONCE
Status: COMPLETED | OUTPATIENT
Start: 2023-01-01 | End: 2023-01-01

## 2023-01-01 RX ORDER — ONDANSETRON 2 MG/ML
4 INJECTION INTRAMUSCULAR; INTRAVENOUS
Status: DISCONTINUED | OUTPATIENT
Start: 2023-01-01 | End: 2023-01-01 | Stop reason: HOSPADM

## 2023-01-01 RX ORDER — GLYCOPYRROLATE 0.2 MG/ML
0.2 INJECTION INTRAMUSCULAR; INTRAVENOUS
Status: DISCONTINUED | OUTPATIENT
Start: 2023-01-01 | End: 2023-01-01 | Stop reason: HOSPADM

## 2023-01-01 RX ORDER — FACIAL-BODY WIPES
10 EACH TOPICAL DAILY PRN
Status: DISCONTINUED | OUTPATIENT
Start: 2023-01-01 | End: 2023-01-01 | Stop reason: HOSPADM

## 2023-01-01 RX ORDER — TRAZODONE HYDROCHLORIDE 100 MG/1
100 TABLET ORAL
Qty: 30 TABLET | Refills: 0 | Status: SHIPPED
Start: 2023-01-01

## 2023-01-01 RX ORDER — MORPHINE SULFATE 4 MG/ML
4 INJECTION INTRAVENOUS
Status: DISCONTINUED | OUTPATIENT
Start: 2023-01-01 | End: 2023-01-01 | Stop reason: HOSPADM

## 2023-01-01 RX ORDER — SERTRALINE HYDROCHLORIDE 25 MG/1
25 TABLET, FILM COATED ORAL DAILY
Qty: 30 TABLET | Refills: 0 | Status: SHIPPED
Start: 2023-01-01

## 2023-01-01 RX ORDER — TRAZODONE HYDROCHLORIDE 50 MG/1
25 TABLET ORAL
Status: DISCONTINUED | OUTPATIENT
Start: 2023-01-01 | End: 2023-01-01 | Stop reason: HOSPADM

## 2023-01-01 RX ORDER — ONDANSETRON 4 MG/1
4 TABLET, ORALLY DISINTEGRATING ORAL
Status: DISCONTINUED | OUTPATIENT
Start: 2023-01-01 | End: 2023-01-01 | Stop reason: HOSPADM

## 2023-01-01 RX ORDER — HALOPERIDOL 5 MG/ML
4 INJECTION INTRAMUSCULAR
Status: DISCONTINUED | OUTPATIENT
Start: 2023-01-01 | End: 2023-01-01

## 2023-01-01 RX ORDER — LORAZEPAM 2 MG/ML
2 INJECTION INTRAMUSCULAR EVERY 4 HOURS
Status: DISCONTINUED | OUTPATIENT
Start: 2023-01-01 | End: 2023-01-01

## 2023-01-01 RX ORDER — TRAZODONE HYDROCHLORIDE 100 MG/1
100 TABLET ORAL
Status: DISCONTINUED | OUTPATIENT
Start: 2023-01-01 | End: 2023-01-01 | Stop reason: HOSPADM

## 2023-01-01 RX ORDER — SODIUM CHLORIDE 0.9 % (FLUSH) 0.9 %
5-40 SYRINGE (ML) INJECTION EVERY 8 HOURS
Status: DISCONTINUED | OUTPATIENT
Start: 2023-01-01 | End: 2023-01-01 | Stop reason: HOSPADM

## 2023-01-01 RX ORDER — ENOXAPARIN SODIUM 100 MG/ML
40 INJECTION SUBCUTANEOUS DAILY
Status: DISCONTINUED | OUTPATIENT
Start: 2023-01-01 | End: 2023-01-01 | Stop reason: HOSPADM

## 2023-01-01 RX ORDER — QUETIAPINE FUMARATE 25 MG/1
50 TABLET, FILM COATED ORAL 2 TIMES DAILY
Status: DISCONTINUED | OUTPATIENT
Start: 2023-01-01 | End: 2023-01-01

## 2023-01-01 RX ORDER — LORAZEPAM 2 MG/ML
2 INJECTION INTRAMUSCULAR
Status: DISCONTINUED | OUTPATIENT
Start: 2023-01-01 | End: 2023-01-01 | Stop reason: HOSPADM

## 2023-01-01 RX ORDER — HALOPERIDOL 5 MG/ML
5 INJECTION INTRAMUSCULAR
Status: DISCONTINUED | OUTPATIENT
Start: 2023-01-01 | End: 2023-01-01 | Stop reason: HOSPADM

## 2023-01-01 RX ORDER — SODIUM CHLORIDE 0.9 % (FLUSH) 0.9 %
5-40 SYRINGE (ML) INJECTION AS NEEDED
Status: DISCONTINUED | OUTPATIENT
Start: 2023-01-01 | End: 2023-01-01 | Stop reason: HOSPADM

## 2023-01-01 RX ORDER — QUETIAPINE FUMARATE 25 MG/1
50 TABLET, FILM COATED ORAL
Status: DISCONTINUED | OUTPATIENT
Start: 2023-01-01 | End: 2023-01-01

## 2023-01-01 RX ORDER — KETOROLAC TROMETHAMINE 30 MG/ML
30 INJECTION, SOLUTION INTRAMUSCULAR; INTRAVENOUS
Status: DISCONTINUED | OUTPATIENT
Start: 2023-01-01 | End: 2023-01-01 | Stop reason: HOSPADM

## 2023-01-01 RX ORDER — MORPHINE SULFATE 2 MG/ML
2 INJECTION, SOLUTION INTRAMUSCULAR; INTRAVENOUS EVERY 4 HOURS
Status: DISCONTINUED | OUTPATIENT
Start: 2023-01-01 | End: 2023-01-01

## 2023-01-01 RX ORDER — QUETIAPINE FUMARATE 25 MG/1
50 TABLET, FILM COATED ORAL 3 TIMES DAILY
Status: DISCONTINUED | OUTPATIENT
Start: 2023-01-01 | End: 2023-01-01 | Stop reason: HOSPADM

## 2023-01-01 RX ORDER — ENOXAPARIN SODIUM 100 MG/ML
40 INJECTION SUBCUTANEOUS DAILY
Status: CANCELLED | OUTPATIENT
Start: 2023-01-01

## 2023-01-01 RX ORDER — SERTRALINE HYDROCHLORIDE 50 MG/1
25 TABLET, FILM COATED ORAL DAILY
Status: DISCONTINUED | OUTPATIENT
Start: 2023-01-01 | End: 2023-01-01 | Stop reason: HOSPADM

## 2023-01-01 RX ORDER — LEVOFLOXACIN 5 MG/ML
750 INJECTION, SOLUTION INTRAVENOUS ONCE
Status: DISCONTINUED | OUTPATIENT
Start: 2023-01-01 | End: 2023-01-01

## 2023-01-01 RX ORDER — ACETAMINOPHEN 650 MG/1
650 SUPPOSITORY RECTAL
Status: DISCONTINUED | OUTPATIENT
Start: 2023-01-01 | End: 2023-01-01 | Stop reason: HOSPADM

## 2023-01-01 RX ADMIN — ENOXAPARIN SODIUM 40 MG: 100 INJECTION SUBCUTANEOUS at 09:56

## 2023-01-01 RX ADMIN — TRAZODONE HYDROCHLORIDE 25 MG: 50 TABLET ORAL at 19:18

## 2023-01-01 RX ADMIN — LORAZEPAM 2 MG: 2 INJECTION INTRAMUSCULAR; INTRAVENOUS at 11:38

## 2023-01-01 RX ADMIN — MORPHINE SULFATE 2 MG: 2 INJECTION, SOLUTION INTRAMUSCULAR; INTRAVENOUS at 04:29

## 2023-01-01 RX ADMIN — DONEPEZIL HYDROCHLORIDE 10 MG: 10 TABLET ORAL at 17:12

## 2023-01-01 RX ADMIN — QUETIAPINE FUMARATE 50 MG: 25 TABLET ORAL at 09:55

## 2023-01-01 RX ADMIN — TRAZODONE HYDROCHLORIDE 100 MG: 50 TABLET ORAL at 21:55

## 2023-01-01 RX ADMIN — MORPHINE SULFATE 2 MG: 2 INJECTION, SOLUTION INTRAMUSCULAR; INTRAVENOUS at 08:13

## 2023-01-01 RX ADMIN — ENOXAPARIN SODIUM 40 MG: 100 INJECTION SUBCUTANEOUS at 09:32

## 2023-01-01 RX ADMIN — DONEPEZIL HYDROCHLORIDE 10 MG: 10 TABLET ORAL at 17:58

## 2023-01-01 RX ADMIN — LORAZEPAM 2 MG: 2 INJECTION INTRAMUSCULAR; INTRAVENOUS at 20:05

## 2023-01-01 RX ADMIN — SODIUM CHLORIDE, PRESERVATIVE FREE 10 ML: 5 INJECTION INTRAVENOUS at 21:29

## 2023-01-01 RX ADMIN — QUETIAPINE FUMARATE 25 MG: 25 TABLET ORAL at 19:24

## 2023-01-01 RX ADMIN — LORAZEPAM 2 MG: 2 INJECTION INTRAMUSCULAR; INTRAVENOUS at 20:31

## 2023-01-01 RX ADMIN — TRAZODONE HYDROCHLORIDE 100 MG: 50 TABLET ORAL at 21:03

## 2023-01-01 RX ADMIN — MORPHINE SULFATE 2 MG: 2 INJECTION, SOLUTION INTRAMUSCULAR; INTRAVENOUS at 20:30

## 2023-01-01 RX ADMIN — TRAZODONE HYDROCHLORIDE 25 MG: 50 TABLET ORAL at 14:40

## 2023-01-01 RX ADMIN — SODIUM CHLORIDE, PRESERVATIVE FREE 10 ML: 5 INJECTION INTRAVENOUS at 05:42

## 2023-01-01 RX ADMIN — CEFTRIAXONE SODIUM 1 G: 1 INJECTION, POWDER, FOR SOLUTION INTRAMUSCULAR; INTRAVENOUS at 21:30

## 2023-01-01 RX ADMIN — ENOXAPARIN SODIUM 40 MG: 100 INJECTION SUBCUTANEOUS at 08:32

## 2023-01-01 RX ADMIN — SODIUM CHLORIDE, PRESERVATIVE FREE 10 ML: 5 INJECTION INTRAVENOUS at 21:30

## 2023-01-01 RX ADMIN — HALOPERIDOL LACTATE 4 MG: 5 INJECTION, SOLUTION INTRAMUSCULAR at 04:14

## 2023-01-01 RX ADMIN — QUETIAPINE FUMARATE 25 MG: 25 TABLET ORAL at 10:05

## 2023-01-01 RX ADMIN — DONEPEZIL HYDROCHLORIDE 10 MG: 10 TABLET ORAL at 17:03

## 2023-01-01 RX ADMIN — QUETIAPINE FUMARATE 50 MG: 25 TABLET ORAL at 21:18

## 2023-01-01 RX ADMIN — SERTRALINE HYDROCHLORIDE 25 MG: 50 TABLET ORAL at 17:58

## 2023-01-01 RX ADMIN — SERTRALINE HYDROCHLORIDE 25 MG: 50 TABLET ORAL at 08:32

## 2023-01-01 RX ADMIN — MORPHINE SULFATE 2 MG: 2 INJECTION, SOLUTION INTRAMUSCULAR; INTRAVENOUS at 02:29

## 2023-01-01 RX ADMIN — DONEPEZIL HYDROCHLORIDE 10 MG: 10 TABLET ORAL at 18:00

## 2023-01-01 RX ADMIN — MORPHINE SULFATE 4 MG: 4 INJECTION INTRAVENOUS at 18:07

## 2023-01-01 RX ADMIN — LORAZEPAM 2 MG: 2 INJECTION INTRAMUSCULAR; INTRAVENOUS at 15:04

## 2023-01-01 RX ADMIN — HALOPERIDOL LACTATE 4 MG: 5 INJECTION, SOLUTION INTRAMUSCULAR at 14:40

## 2023-01-01 RX ADMIN — QUETIAPINE FUMARATE 50 MG: 25 TABLET ORAL at 20:00

## 2023-01-01 RX ADMIN — LORAZEPAM 2 MG: 2 INJECTION INTRAMUSCULAR; INTRAVENOUS at 13:37

## 2023-01-01 RX ADMIN — TRAZODONE HYDROCHLORIDE 25 MG: 50 TABLET ORAL at 12:47

## 2023-01-01 RX ADMIN — SODIUM CHLORIDE, PRESERVATIVE FREE 10 ML: 5 INJECTION INTRAVENOUS at 21:25

## 2023-01-01 RX ADMIN — LORAZEPAM 2 MG: 2 INJECTION INTRAMUSCULAR; INTRAVENOUS at 07:29

## 2023-01-01 RX ADMIN — HALOPERIDOL LACTATE 4 MG: 5 INJECTION, SOLUTION INTRAMUSCULAR at 19:52

## 2023-01-01 RX ADMIN — CEFTRIAXONE SODIUM 1 G: 1 INJECTION, POWDER, FOR SOLUTION INTRAMUSCULAR; INTRAVENOUS at 21:27

## 2023-01-01 RX ADMIN — Medication 3 MG: at 21:55

## 2023-01-01 RX ADMIN — TRAZODONE HYDROCHLORIDE 100 MG: 50 TABLET ORAL at 22:02

## 2023-01-01 RX ADMIN — LORAZEPAM 2 MG: 2 INJECTION INTRAMUSCULAR; INTRAVENOUS at 21:30

## 2023-01-01 RX ADMIN — QUETIAPINE FUMARATE 50 MG: 25 TABLET ORAL at 08:45

## 2023-01-01 RX ADMIN — DONEPEZIL HYDROCHLORIDE 10 MG: 10 TABLET ORAL at 17:56

## 2023-01-01 RX ADMIN — TRAZODONE HYDROCHLORIDE 25 MG: 50 TABLET ORAL at 15:28

## 2023-01-01 RX ADMIN — MORPHINE SULFATE 2 MG: 2 INJECTION, SOLUTION INTRAMUSCULAR; INTRAVENOUS at 17:40

## 2023-01-01 RX ADMIN — Medication 3 MG: at 22:01

## 2023-01-01 RX ADMIN — TRAZODONE HYDROCHLORIDE 100 MG: 50 TABLET ORAL at 21:39

## 2023-01-01 RX ADMIN — TRAZODONE HYDROCHLORIDE 25 MG: 50 TABLET ORAL at 09:55

## 2023-01-01 RX ADMIN — TRAZODONE HYDROCHLORIDE 100 MG: 50 TABLET ORAL at 21:08

## 2023-01-01 RX ADMIN — QUETIAPINE FUMARATE 50 MG: 25 TABLET ORAL at 22:01

## 2023-01-01 RX ADMIN — LORAZEPAM 2 MG: 2 INJECTION INTRAMUSCULAR; INTRAVENOUS at 02:29

## 2023-01-01 RX ADMIN — LORAZEPAM 2 MG: 2 INJECTION INTRAMUSCULAR; INTRAVENOUS at 18:07

## 2023-01-01 RX ADMIN — ACETAMINOPHEN 650 MG: 325 TABLET ORAL at 22:02

## 2023-01-01 RX ADMIN — LORAZEPAM 2 MG: 2 INJECTION INTRAMUSCULAR; INTRAVENOUS at 17:40

## 2023-01-01 RX ADMIN — ENOXAPARIN SODIUM 40 MG: 100 INJECTION SUBCUTANEOUS at 08:03

## 2023-01-01 RX ADMIN — TRAZODONE HYDROCHLORIDE 25 MG: 50 TABLET ORAL at 08:31

## 2023-01-01 RX ADMIN — HALOPERIDOL LACTATE 5 MG: 5 INJECTION, SOLUTION INTRAMUSCULAR at 22:45

## 2023-01-01 RX ADMIN — DONEPEZIL HYDROCHLORIDE 10 MG: 10 TABLET ORAL at 20:00

## 2023-01-01 RX ADMIN — HALOPERIDOL LACTATE 4 MG: 5 INJECTION, SOLUTION INTRAMUSCULAR at 14:04

## 2023-01-01 RX ADMIN — DONEPEZIL HYDROCHLORIDE 10 MG: 10 TABLET ORAL at 19:11

## 2023-01-01 RX ADMIN — TRAZODONE HYDROCHLORIDE 100 MG: 50 TABLET ORAL at 22:01

## 2023-01-01 RX ADMIN — TRAZODONE HYDROCHLORIDE 25 MG: 50 TABLET ORAL at 17:02

## 2023-01-01 RX ADMIN — ENOXAPARIN SODIUM 40 MG: 100 INJECTION SUBCUTANEOUS at 10:06

## 2023-01-01 RX ADMIN — SERTRALINE HYDROCHLORIDE 25 MG: 50 TABLET ORAL at 10:00

## 2023-01-01 RX ADMIN — Medication 3 MG: at 22:49

## 2023-01-01 RX ADMIN — LORAZEPAM 2 MG: 2 INJECTION INTRAMUSCULAR; INTRAVENOUS at 08:13

## 2023-01-01 RX ADMIN — TRAZODONE HYDROCHLORIDE 25 MG: 50 TABLET ORAL at 10:45

## 2023-01-01 RX ADMIN — SODIUM CHLORIDE, PRESERVATIVE FREE 10 ML: 5 INJECTION INTRAVENOUS at 07:36

## 2023-01-01 RX ADMIN — TRAZODONE HYDROCHLORIDE 25 MG: 50 TABLET ORAL at 11:58

## 2023-01-01 RX ADMIN — QUETIAPINE FUMARATE 50 MG: 25 TABLET ORAL at 21:22

## 2023-01-01 RX ADMIN — TRAZODONE HYDROCHLORIDE 100 MG: 50 TABLET ORAL at 21:22

## 2023-01-01 RX ADMIN — SODIUM CHLORIDE, PRESERVATIVE FREE 10 ML: 5 INJECTION INTRAVENOUS at 17:55

## 2023-01-01 RX ADMIN — LORAZEPAM 2 MG: 2 INJECTION INTRAMUSCULAR; INTRAVENOUS at 05:55

## 2023-01-01 RX ADMIN — TRAZODONE HYDROCHLORIDE 100 MG: 50 TABLET ORAL at 02:07

## 2023-01-01 RX ADMIN — TRAZODONE HYDROCHLORIDE 25 MG: 50 TABLET ORAL at 08:03

## 2023-01-01 RX ADMIN — QUETIAPINE FUMARATE 25 MG: 25 TABLET ORAL at 16:01

## 2023-01-01 RX ADMIN — MORPHINE SULFATE 4 MG: 4 INJECTION INTRAVENOUS at 21:30

## 2023-01-01 RX ADMIN — ENOXAPARIN SODIUM 40 MG: 100 INJECTION SUBCUTANEOUS at 12:39

## 2023-01-01 RX ADMIN — MORPHINE SULFATE 4 MG: 4 INJECTION INTRAVENOUS at 12:46

## 2023-01-01 RX ADMIN — ENOXAPARIN SODIUM 40 MG: 100 INJECTION SUBCUTANEOUS at 09:06

## 2023-01-01 RX ADMIN — LORAZEPAM 2 MG: 2 INJECTION INTRAMUSCULAR; INTRAVENOUS at 19:31

## 2023-01-01 RX ADMIN — LORAZEPAM 2 MG: 2 INJECTION INTRAMUSCULAR; INTRAVENOUS at 12:45

## 2023-01-01 RX ADMIN — ZIPRASIDONE MESYLATE 20 MG: 20 INJECTION, POWDER, LYOPHILIZED, FOR SOLUTION INTRAMUSCULAR at 13:41

## 2023-01-01 RX ADMIN — TRAZODONE HYDROCHLORIDE 25 MG: 50 TABLET ORAL at 12:30

## 2023-01-01 RX ADMIN — QUETIAPINE FUMARATE 50 MG: 25 TABLET ORAL at 12:39

## 2023-01-01 RX ADMIN — HALOPERIDOL LACTATE 4 MG: 5 INJECTION, SOLUTION INTRAMUSCULAR at 17:36

## 2023-01-01 RX ADMIN — MORPHINE SULFATE 2 MG: 2 INJECTION, SOLUTION INTRAMUSCULAR; INTRAVENOUS at 09:55

## 2023-01-01 RX ADMIN — TRAZODONE HYDROCHLORIDE 25 MG: 50 TABLET ORAL at 08:45

## 2023-01-01 RX ADMIN — ACETAMINOPHEN 650 MG: 325 TABLET ORAL at 09:05

## 2023-01-01 RX ADMIN — MORPHINE SULFATE 2 MG: 2 INJECTION, SOLUTION INTRAMUSCULAR; INTRAVENOUS at 20:05

## 2023-01-01 RX ADMIN — QUETIAPINE FUMARATE 25 MG: 25 TABLET ORAL at 09:32

## 2023-01-01 RX ADMIN — SODIUM CHLORIDE, PRESERVATIVE FREE 10 ML: 5 INJECTION INTRAVENOUS at 14:00

## 2023-01-01 RX ADMIN — ENOXAPARIN SODIUM 40 MG: 100 INJECTION SUBCUTANEOUS at 11:59

## 2023-01-01 RX ADMIN — MORPHINE SULFATE 2 MG: 2 INJECTION, SOLUTION INTRAMUSCULAR; INTRAVENOUS at 00:22

## 2023-01-01 RX ADMIN — MORPHINE SULFATE 2 MG: 2 INJECTION, SOLUTION INTRAMUSCULAR; INTRAVENOUS at 07:29

## 2023-01-01 RX ADMIN — DONEPEZIL HYDROCHLORIDE 10 MG: 10 TABLET ORAL at 17:42

## 2023-01-01 RX ADMIN — Medication 3 MG: at 22:20

## 2023-01-01 RX ADMIN — TRAZODONE HYDROCHLORIDE 100 MG: 50 TABLET ORAL at 22:48

## 2023-01-01 RX ADMIN — QUETIAPINE FUMARATE 50 MG: 25 TABLET ORAL at 08:32

## 2023-01-01 RX ADMIN — SODIUM CHLORIDE, PRESERVATIVE FREE 10 ML: 5 INJECTION INTRAVENOUS at 05:22

## 2023-01-01 RX ADMIN — MORPHINE SULFATE 2 MG: 2 INJECTION, SOLUTION INTRAMUSCULAR; INTRAVENOUS at 05:55

## 2023-01-01 RX ADMIN — SERTRALINE HYDROCHLORIDE 25 MG: 50 TABLET ORAL at 09:05

## 2023-01-01 RX ADMIN — QUETIAPINE FUMARATE 50 MG: 25 TABLET ORAL at 09:05

## 2023-01-01 RX ADMIN — TRAZODONE HYDROCHLORIDE 100 MG: 50 TABLET ORAL at 21:18

## 2023-01-01 RX ADMIN — SERTRALINE HYDROCHLORIDE 25 MG: 50 TABLET ORAL at 12:38

## 2023-01-01 RX ADMIN — MORPHINE SULFATE 2 MG: 2 INJECTION, SOLUTION INTRAMUSCULAR; INTRAVENOUS at 11:38

## 2023-01-01 RX ADMIN — QUETIAPINE FUMARATE 50 MG: 25 TABLET ORAL at 21:06

## 2023-01-01 RX ADMIN — TRAZODONE HYDROCHLORIDE 25 MG: 50 TABLET ORAL at 12:20

## 2023-01-01 RX ADMIN — TRAZODONE HYDROCHLORIDE 25 MG: 50 TABLET ORAL at 12:38

## 2023-01-01 RX ADMIN — TRAZODONE HYDROCHLORIDE 25 MG: 50 TABLET ORAL at 12:56

## 2023-01-01 RX ADMIN — ENOXAPARIN SODIUM 40 MG: 100 INJECTION SUBCUTANEOUS at 08:29

## 2023-01-01 RX ADMIN — QUETIAPINE FUMARATE 50 MG: 25 TABLET ORAL at 19:13

## 2023-01-01 RX ADMIN — Medication 3 MG: at 21:11

## 2023-01-01 RX ADMIN — Medication 3 MG: at 22:02

## 2023-01-01 RX ADMIN — QUETIAPINE FUMARATE 50 MG: 25 TABLET ORAL at 17:30

## 2023-01-01 RX ADMIN — MORPHINE SULFATE 4 MG: 4 INJECTION INTRAVENOUS at 19:31

## 2023-01-01 RX ADMIN — DONEPEZIL HYDROCHLORIDE 10 MG: 10 TABLET ORAL at 18:35

## 2023-01-01 RX ADMIN — HALOPERIDOL LACTATE 5 MG: 5 INJECTION, SOLUTION INTRAMUSCULAR at 21:04

## 2023-01-01 RX ADMIN — DIAZEPAM 5 MG: 5 INJECTION, SOLUTION INTRAMUSCULAR; INTRAVENOUS at 13:00

## 2023-01-01 RX ADMIN — MORPHINE SULFATE 4 MG: 4 INJECTION INTRAVENOUS at 00:27

## 2023-01-01 RX ADMIN — SODIUM CHLORIDE, PRESERVATIVE FREE 10 ML: 5 INJECTION INTRAVENOUS at 12:40

## 2023-01-01 RX ADMIN — Medication 3 MG: at 22:43

## 2023-01-01 RX ADMIN — LORAZEPAM 2 MG: 2 INJECTION INTRAMUSCULAR; INTRAVENOUS at 09:55

## 2023-01-01 RX ADMIN — ENOXAPARIN SODIUM 40 MG: 100 INJECTION SUBCUTANEOUS at 08:14

## 2023-01-01 RX ADMIN — SERTRALINE HYDROCHLORIDE 25 MG: 50 TABLET ORAL at 11:58

## 2023-01-01 RX ADMIN — TRAZODONE HYDROCHLORIDE 25 MG: 50 TABLET ORAL at 09:31

## 2023-01-01 RX ADMIN — QUETIAPINE FUMARATE 50 MG: 25 TABLET ORAL at 21:12

## 2023-01-01 RX ADMIN — TRAZODONE HYDROCHLORIDE 25 MG: 50 TABLET ORAL at 09:05

## 2023-01-01 RX ADMIN — DONEPEZIL HYDROCHLORIDE 10 MG: 10 TABLET ORAL at 18:39

## 2023-01-01 RX ADMIN — ACETAMINOPHEN 650 MG: 325 TABLET ORAL at 21:04

## 2023-01-01 RX ADMIN — SERTRALINE HYDROCHLORIDE 25 MG: 50 TABLET ORAL at 08:44

## 2023-01-01 RX ADMIN — MORPHINE SULFATE 2 MG: 2 INJECTION, SOLUTION INTRAMUSCULAR; INTRAVENOUS at 13:36

## 2023-01-01 RX ADMIN — TRAZODONE HYDROCHLORIDE 100 MG: 50 TABLET ORAL at 21:11

## 2023-01-01 RX ADMIN — QUETIAPINE FUMARATE 50 MG: 25 TABLET ORAL at 22:02

## 2023-01-01 RX ADMIN — TRAZODONE HYDROCHLORIDE 25 MG: 50 TABLET ORAL at 08:56

## 2023-01-01 RX ADMIN — MORPHINE SULFATE 2 MG: 2 INJECTION, SOLUTION INTRAMUSCULAR; INTRAVENOUS at 16:00

## 2023-01-01 RX ADMIN — QUETIAPINE FUMARATE 50 MG: 25 TABLET ORAL at 22:43

## 2023-01-01 RX ADMIN — QUETIAPINE FUMARATE 25 MG: 25 TABLET ORAL at 14:26

## 2023-01-01 RX ADMIN — TRAZODONE HYDROCHLORIDE 25 MG: 50 TABLET ORAL at 08:29

## 2023-01-01 RX ADMIN — Medication 3 MG: at 21:39

## 2023-01-01 RX ADMIN — Medication 3 MG: at 22:14

## 2023-01-01 RX ADMIN — LORAZEPAM 2 MG: 2 INJECTION INTRAMUSCULAR; INTRAVENOUS at 04:30

## 2023-01-01 RX ADMIN — DONEPEZIL HYDROCHLORIDE 10 MG: 10 TABLET ORAL at 21:12

## 2023-01-01 RX ADMIN — HALOPERIDOL LACTATE 4 MG: 5 INJECTION, SOLUTION INTRAMUSCULAR at 20:31

## 2023-01-01 RX ADMIN — LORAZEPAM 2 MG: 2 INJECTION INTRAMUSCULAR; INTRAVENOUS at 00:22

## 2023-01-01 RX ADMIN — MORPHINE SULFATE 2 MG: 2 INJECTION, SOLUTION INTRAMUSCULAR; INTRAVENOUS at 12:45

## 2023-01-01 RX ADMIN — LORAZEPAM 2 MG: 2 INJECTION INTRAMUSCULAR; INTRAVENOUS at 00:26

## 2023-01-01 RX ADMIN — ENOXAPARIN SODIUM 40 MG: 100 INJECTION SUBCUTANEOUS at 10:00

## 2023-01-01 RX ADMIN — QUETIAPINE FUMARATE 25 MG: 25 TABLET ORAL at 08:29

## 2023-01-01 RX ADMIN — HALOPERIDOL LACTATE 4 MG: 5 INJECTION, SOLUTION INTRAMUSCULAR at 20:50

## 2023-01-01 RX ADMIN — SERTRALINE HYDROCHLORIDE 25 MG: 50 TABLET ORAL at 08:56

## 2023-01-01 RX ADMIN — LORAZEPAM 2 MG: 2 INJECTION INTRAMUSCULAR; INTRAVENOUS at 16:00

## 2023-01-01 RX ADMIN — TRAZODONE HYDROCHLORIDE 25 MG: 50 TABLET ORAL at 17:58

## 2023-01-01 RX ADMIN — QUETIAPINE FUMARATE 25 MG: 25 TABLET ORAL at 02:13

## 2023-01-01 RX ADMIN — TRAZODONE HYDROCHLORIDE 25 MG: 50 TABLET ORAL at 18:22

## 2023-01-01 RX ADMIN — QUETIAPINE FUMARATE 50 MG: 25 TABLET ORAL at 22:49

## 2023-01-01 RX ADMIN — SERTRALINE HYDROCHLORIDE 25 MG: 50 TABLET ORAL at 09:54

## 2023-01-01 RX ADMIN — TRAZODONE HYDROCHLORIDE 25 MG: 50 TABLET ORAL at 10:05

## 2023-01-01 RX ADMIN — QUETIAPINE FUMARATE 50 MG: 25 TABLET ORAL at 21:39

## 2023-01-01 RX ADMIN — QUETIAPINE FUMARATE 50 MG: 25 TABLET ORAL at 17:12

## 2023-01-01 RX ADMIN — SERTRALINE HYDROCHLORIDE 25 MG: 50 TABLET ORAL at 08:03

## 2023-01-01 RX ADMIN — TRAZODONE HYDROCHLORIDE 100 MG: 50 TABLET ORAL at 22:20

## 2023-01-01 RX ADMIN — MORPHINE SULFATE 4 MG: 4 INJECTION INTRAVENOUS at 15:05

## 2023-01-01 RX ADMIN — SODIUM CHLORIDE, PRESERVATIVE FREE 10 ML: 5 INJECTION INTRAVENOUS at 18:37

## 2023-01-01 RX ADMIN — HALOPERIDOL LACTATE 5 MG: 5 INJECTION, SOLUTION INTRAMUSCULAR at 14:09

## 2023-01-01 RX ADMIN — TRAZODONE HYDROCHLORIDE 100 MG: 50 TABLET ORAL at 22:14

## 2023-01-01 RX ADMIN — GLYCOPYRROLATE 0.2 MG: 0.2 INJECTION INTRAMUSCULAR; INTRAVENOUS at 19:54

## 2023-01-01 RX ADMIN — SODIUM CHLORIDE, PRESERVATIVE FREE 10 ML: 5 INJECTION INTRAVENOUS at 16:01

## 2023-01-01 RX ADMIN — TRAZODONE HYDROCHLORIDE 100 MG: 50 TABLET ORAL at 22:43

## 2023-01-01 RX ADMIN — ACETAMINOPHEN 650 MG: 325 TABLET ORAL at 21:19

## 2023-01-01 RX ADMIN — DONEPEZIL HYDROCHLORIDE 10 MG: 10 TABLET ORAL at 17:30

## 2023-01-01 RX ADMIN — HALOPERIDOL LACTATE 4 MG: 5 INJECTION, SOLUTION INTRAMUSCULAR at 16:00

## 2023-01-01 RX ADMIN — QUETIAPINE FUMARATE 50 MG: 25 TABLET ORAL at 21:07

## 2023-02-20 ENCOUNTER — HOSPITAL ENCOUNTER (EMERGENCY)
Age: 88
Discharge: NURSING, REHAB OR LONG-TERM CARE | DRG: 689 | End: 2023-02-20
Attending: EMERGENCY MEDICINE
Payer: MEDICARE

## 2023-02-20 VITALS
BODY MASS INDEX: 26.58 KG/M2 | OXYGEN SATURATION: 98 % | RESPIRATION RATE: 20 BRPM | TEMPERATURE: 97.5 F | SYSTOLIC BLOOD PRESSURE: 120 MMHG | HEIGHT: 72 IN | HEART RATE: 47 BPM | WEIGHT: 196.21 LBS | DIASTOLIC BLOOD PRESSURE: 78 MMHG

## 2023-02-20 DIAGNOSIS — F03.B11 MODERATE DEMENTIA WITH AGITATION, UNSPECIFIED DEMENTIA TYPE: ICD-10-CM

## 2023-02-20 DIAGNOSIS — N39.0 URINARY TRACT INFECTION WITHOUT HEMATURIA, SITE UNSPECIFIED: Primary | ICD-10-CM

## 2023-02-20 LAB
ALBUMIN SERPL-MCNC: 3.6 G/DL (ref 3.5–5)
ALBUMIN/GLOB SERPL: 1.2 (ref 1.1–2.2)
ALP SERPL-CCNC: 111 U/L (ref 45–117)
ALT SERPL-CCNC: 19 U/L (ref 12–78)
ANION GAP SERPL CALC-SCNC: 3 MMOL/L (ref 5–15)
APPEARANCE UR: ABNORMAL
AST SERPL-CCNC: 17 U/L (ref 15–37)
ATRIAL RATE: 52 BPM
BACTERIA URNS QL MICRO: ABNORMAL /HPF
BASOPHILS # BLD: 0.1 K/UL (ref 0–0.1)
BASOPHILS NFR BLD: 1 % (ref 0–1)
BILIRUB SERPL-MCNC: 0.3 MG/DL (ref 0.2–1)
BILIRUB UR QL: NEGATIVE
BUN SERPL-MCNC: 22 MG/DL (ref 6–20)
BUN/CREAT SERPL: 23 (ref 12–20)
CALCIUM SERPL-MCNC: 9.1 MG/DL (ref 8.5–10.1)
CALCULATED P AXIS, ECG09: 67 DEGREES
CALCULATED R AXIS, ECG10: 76 DEGREES
CALCULATED T AXIS, ECG11: 50 DEGREES
CHLORIDE SERPL-SCNC: 108 MMOL/L (ref 97–108)
CO2 SERPL-SCNC: 30 MMOL/L (ref 21–32)
COLOR UR: ABNORMAL
COMMENT, HOLDF: NORMAL
CREAT SERPL-MCNC: 0.95 MG/DL (ref 0.7–1.3)
DIAGNOSIS, 93000: NORMAL
DIFFERENTIAL METHOD BLD: ABNORMAL
EOSINOPHIL # BLD: 0.4 K/UL (ref 0–0.4)
EOSINOPHIL NFR BLD: 3 % (ref 0–7)
EPITH CASTS URNS QL MICRO: ABNORMAL /LPF
ERYTHROCYTE [DISTWIDTH] IN BLOOD BY AUTOMATED COUNT: 13.2 % (ref 11.5–14.5)
GLOBULIN SER CALC-MCNC: 3.1 G/DL (ref 2–4)
GLUCOSE SERPL-MCNC: 115 MG/DL (ref 65–100)
GLUCOSE UR STRIP.AUTO-MCNC: NEGATIVE MG/DL
HCT VFR BLD AUTO: 39.9 % (ref 36.6–50.3)
HGB BLD-MCNC: 12.2 G/DL (ref 12.1–17)
HGB UR QL STRIP: ABNORMAL
HYALINE CASTS URNS QL MICRO: ABNORMAL /LPF (ref 0–5)
IMM GRANULOCYTES # BLD AUTO: 0 K/UL (ref 0–0.04)
IMM GRANULOCYTES NFR BLD AUTO: 0 % (ref 0–0.5)
KETONES UR QL STRIP.AUTO: ABNORMAL MG/DL
LEUKOCYTE ESTERASE UR QL STRIP.AUTO: ABNORMAL
LYMPHOCYTES # BLD: 2.2 K/UL (ref 0.8–3.5)
LYMPHOCYTES NFR BLD: 19 % (ref 12–49)
MCH RBC QN AUTO: 29.6 PG (ref 26–34)
MCHC RBC AUTO-ENTMCNC: 30.6 G/DL (ref 30–36.5)
MCV RBC AUTO: 96.8 FL (ref 80–99)
MONOCYTES # BLD: 0.9 K/UL (ref 0–1)
MONOCYTES NFR BLD: 8 % (ref 5–13)
NEUTS SEG # BLD: 8.2 K/UL (ref 1.8–8)
NEUTS SEG NFR BLD: 69 % (ref 32–75)
NITRITE UR QL STRIP.AUTO: POSITIVE
NRBC # BLD: 0 K/UL (ref 0–0.01)
NRBC BLD-RTO: 0 PER 100 WBC
P-R INTERVAL, ECG05: 128 MS
PH UR STRIP: 7 (ref 5–8)
PLATELET # BLD AUTO: 242 K/UL (ref 150–400)
PMV BLD AUTO: 9.9 FL (ref 8.9–12.9)
POTASSIUM SERPL-SCNC: 4.1 MMOL/L (ref 3.5–5.1)
PROT SERPL-MCNC: 6.7 G/DL (ref 6.4–8.2)
PROT UR STRIP-MCNC: 100 MG/DL
Q-T INTERVAL, ECG07: 466 MS
QRS DURATION, ECG06: 132 MS
QTC CALCULATION (BEZET), ECG08: 433 MS
RBC # BLD AUTO: 4.12 M/UL (ref 4.1–5.7)
RBC #/AREA URNS HPF: ABNORMAL /HPF (ref 0–5)
SAMPLES BEING HELD,HOLD: NORMAL
SODIUM SERPL-SCNC: 141 MMOL/L (ref 136–145)
SP GR UR REFRACTOMETRY: 1.02 (ref 1–1.03)
UROBILINOGEN UR QL STRIP.AUTO: 0.2 EU/DL (ref 0.2–1)
VENTRICULAR RATE, ECG03: 52 BPM
WBC # BLD AUTO: 11.8 K/UL (ref 4.1–11.1)
WBC URNS QL MICRO: >100 /HPF (ref 0–4)

## 2023-02-20 PROCEDURE — 74011250636 HC RX REV CODE- 250/636: Performed by: EMERGENCY MEDICINE

## 2023-02-20 PROCEDURE — 80053 COMPREHEN METABOLIC PANEL: CPT

## 2023-02-20 PROCEDURE — 99284 EMERGENCY DEPT VISIT MOD MDM: CPT

## 2023-02-20 PROCEDURE — 74011250637 HC RX REV CODE- 250/637: Performed by: EMERGENCY MEDICINE

## 2023-02-20 PROCEDURE — 96374 THER/PROPH/DIAG INJ IV PUSH: CPT

## 2023-02-20 PROCEDURE — 96361 HYDRATE IV INFUSION ADD-ON: CPT

## 2023-02-20 PROCEDURE — 36415 COLL VENOUS BLD VENIPUNCTURE: CPT

## 2023-02-20 PROCEDURE — 93005 ELECTROCARDIOGRAM TRACING: CPT

## 2023-02-20 PROCEDURE — 85025 COMPLETE CBC W/AUTO DIFF WBC: CPT

## 2023-02-20 PROCEDURE — 81001 URINALYSIS AUTO W/SCOPE: CPT

## 2023-02-20 PROCEDURE — 96376 TX/PRO/DX INJ SAME DRUG ADON: CPT

## 2023-02-20 RX ORDER — CEPHALEXIN 500 MG/1
500 CAPSULE ORAL 2 TIMES DAILY
Qty: 14 CAPSULE | Refills: 0 | Status: SHIPPED | OUTPATIENT
Start: 2023-02-20 | End: 2023-03-07

## 2023-02-20 RX ORDER — LORAZEPAM 2 MG/ML
1 INJECTION, SOLUTION INTRAMUSCULAR; INTRAVENOUS ONCE
Status: COMPLETED | OUTPATIENT
Start: 2023-02-20 | End: 2023-02-20

## 2023-02-20 RX ORDER — CEPHALEXIN 500 MG/1
500 CAPSULE ORAL
Status: COMPLETED | OUTPATIENT
Start: 2023-02-20 | End: 2023-02-20

## 2023-02-20 RX ADMIN — CEPHALEXIN 500 MG: 500 CAPSULE ORAL at 16:00

## 2023-02-20 RX ADMIN — SODIUM CHLORIDE 500 ML: 9 INJECTION, SOLUTION INTRAVENOUS at 14:05

## 2023-02-20 RX ADMIN — LORAZEPAM 1 MG: 2 INJECTION, SOLUTION INTRAMUSCULAR; INTRAVENOUS at 11:40

## 2023-02-20 RX ADMIN — LORAZEPAM 1 MG: 2 INJECTION, SOLUTION INTRAMUSCULAR; INTRAVENOUS at 12:48

## 2023-02-20 RX ADMIN — LORAZEPAM 1 MG: 2 INJECTION, SOLUTION INTRAMUSCULAR; INTRAVENOUS at 15:04

## 2023-02-20 NOTE — ED PROVIDER NOTES
80-year old male with history of dementia as well as other comorbidities presents emergency department chief complaint of altered mental status. Nursing home states that he has become more combative, states that he presented similarly when he had a urinary tract infection. Patient is not able to provide further information secondary to his dementia    The history is provided by the nursing home and a relative. Referral / Consult  This is a recurrent problem.       Past Medical History:   Diagnosis Date    Bleeding duodenal ulcer     BPH (benign prostatic hyperplasia) 10/9/2013    Cancer (HCC)     melanoma    Carpal tunnel syndrome     DJD (degenerative joint disease)     GERD (gastroesophageal reflux disease) 1/25/2012    Glucose intolerance (impaired glucose tolerance) 7/20/2011    HTN (hypertension) 7/20/2011    Hyperlipidemia 7/20/2011    Lumbar spinal stenosis     Mastoiditis     Memory changes 8/20/2015    PAF (paroxysmal atrial fibrillation) ([facility] [address] 7/20/2011    RBBB (right bundle branch block)     Zoster        Past Surgical History:   Procedure Laterality Date    HX APPENDECTOMY      HX OTHER SURGICAL      lung biopsy     HX OTHER SURGICAL      melanoma excision    HX TONSIL AND ADENOIDECTOMY           Family History:   Problem Relation Age of Onset    Diabetes Mother     Heart Attack Mother     Heart Disease Father     Diabetes Brother     Heart Disease Brother     Cancer Brother        Social History     Socioeconomic History    Marital status:      Spouse name: Not on file    Number of children: Not on file    Years of education: Not on file    Highest education level: Not on file   Occupational History    Not on file   Tobacco Use    Smoking status: Former    Smokeless tobacco: Not on file   Substance and Sexual Activity    Alcohol use: No    Drug use: No    Sexual activity: Not on file   Other Topics Concern    Not on file   Social History Narrative    Not on file     Social Determinants of Health     Financial Resource Strain: Not on file   Food Insecurity: Not on file   Transportation Needs: Not on file   Physical Activity: Not on file   Stress: Not on file   Social Connections: Not on file   Intimate Partner Violence: Not on file   Housing Stability: Not on file         ALLERGIES: Lipitor [atorvastatin], Pcn [penicillins], and Pravastatin    Review of Systems   Reason unable to perform ROS: dementia. All other systems reviewed and are negative. Vitals:    02/20/23 0957 02/20/23 1033   BP: 104/67 111/68   Pulse: 89 70   Resp: 20    Temp: 97.5 °F (36.4 °C)    SpO2: 100% 99%   Weight: 89 kg (196 lb 3.4 oz)    Height: 6' (1.829 m)             Physical Exam  Vitals and nursing note reviewed. Constitutional:       Comments: Patient is easily agitated and combative. Pulling at IVs, cursing at staff, does not appear acutely ill   HENT:      Head: Normocephalic and atraumatic. Eyes:      Extraocular Movements: Extraocular movements intact. Pupils: Pupils are equal, round, and reactive to light. Cardiovascular:      Rate and Rhythm: Bradycardia present. Pulmonary:      Effort: Pulmonary effort is normal.      Breath sounds: Normal breath sounds. Abdominal:      General: Abdomen is flat. Bowel sounds are normal.      Comments: Has adult diaper on   Musculoskeletal:         General: Normal range of motion. Skin:     General: Skin is warm and dry. Neurological:      Mental Status: He is alert. He is disoriented. Medical Decision Making  Amount and/or Complexity of Data Reviewed  Labs: ordered. ECG/medicine tests: ordered. Risk  Prescription drug management. ED Course as of 02/20/23 1219   Mon Feb 20, 2023   [facility] EKG interpretation: There is a sinus rhythm at 52 beats a minute. Axis is normal.  There is a right bundle branch block. No ectopy is noted. No acute ischemic changes noted.  [RP]      ED Course User Index  [RP] Nery Diaz MD     Normal change after initial Ativan, will repeat dose    80-year-old male with a history of dementia presents emergency department chief complaint of increased combativeness. Patient has a history of UTI in which she presented with increased agitation. His white blood cell count is slightly elevated 11.8, complete metabolic panel was within normal limits except for BUN of 22, patient became quite agitated and required multiple doses of Ativan. We attempted to place a straight catheter however we had difficulty retracting the foreskin to place the catheter. We will hold on catheterization for now and give IV fluids. Patient is resting comfortably at the time with urinal in place. UTI was noted and patient was given a dose of antibiotics in the emergency department. Discussed with family member that patient would most likely benefit from being discharged back to dementia care unit versus staying in the hospital.  Son is in agreement with this at this time.   Patient was discharged back to care facility and given a prescription for antibiotics    Procedures

## 2023-02-20 NOTE — ED NOTES
Gave report to Steven at [facility]. Patient's son took patient back to facility.

## 2023-02-20 NOTE — ED NOTES
Pt swinging at staff, pulling lines and equipment off, attempting to get out of bed, cursing at staff, called for safety sitter

## 2023-02-21 PROBLEM — G93.40 ACUTE ENCEPHALOPATHY: Status: ACTIVE | Noted: 2023-01-01
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
## 2023-02-21 NOTE — ED NOTES
Pt pulled out PIV when family was looking away. There is blood on his shirt and pants. Bleeding controlled and bandage placed.
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
## 2023-02-21 NOTE — ED NOTES
Patient transferred successfully with max 2 assist from wheel chair to bed without incident. Pt confused and pulling at shirt and has dried blood on it from the patient puling out his own line. Patient's visitor is at bedside and requested that we do not put in a new line, change him into a gown or get a UA from him as this will set off his agitation. He is only requesting a Rx to keep the patient \"calm\" longterm.

## 2023-02-22 NOTE — H&P
History and Physical    Date of Service:  2/21/2023  Primary Care Provider: Jayy Robles MD  Source of information: Chart review and Spouse/family member    Chief Complaint: Mental Health Problem and Bladder Infection      History of Presenting Illness:   Danny Goodman is a 80 y.o. male with a pmhx BPH, melanoma, HTN, GERD, dyslipidemia, and pAF who presents from [facility] with altered mental status, and is being admitted for acute encephalopathy due to acute cystitis. History given by patients son who is at bedside. He states that at baseline pt is ambulatory independently, but has confused speech, but usually calm, but curses. He usually becomes aggressive when he has a UTI, and patient has been aggressive for the past 72 hours    In the ED, pressure was initially 95/59 with improvement to 135/97. Other vital signs were stable. Labs were significant for UA with positive nitrites, large leukocyte, >100 WBC, and 4+ bacteria. EKG showed sinus bradycardia with PAC's. In the ED, levaquin and haldol were ordered. REVIEW OF SYSTEMS:  Review of systems not obtained due to patient factors. , acute encephalopathy on chronic dementia    Past Medical History:   Diagnosis Date    Bleeding duodenal ulcer     BPH (benign prostatic hyperplasia) 10/9/2013    Cancer ([address]     melanoma    Carpal tunnel syndrome     DJD (degenerative joint disease)     GERD (gastroesophageal reflux disease) 1/25/2012    Glucose intolerance (impaired glucose tolerance) 7/20/2011    HTN (hypertension) 7/20/2011    Hyperlipidemia 7/20/2011    Lumbar spinal stenosis     Mastoiditis     Memory changes 8/20/2015    PAF (paroxysmal atrial fibrillation) ([facility] 7/20/2011    RBBB (right bundle branch block)     Zoster       Past Surgical History:   Procedure Laterality Date    HX APPENDECTOMY      HX OTHER SURGICAL      lung biopsy     HX OTHER SURGICAL      melanoma excision    HX TONSIL AND ADENOIDECTOMY       Prior to Admission medications    Medication Sig Start Date End Date Taking? Authorizing Provider   cephALEXin (Keflex) 500 mg capsule Take 1 Capsule by mouth two (2) times a day for 7 days. 2/20/23 2/27/23  Nelson Davidson DO   aspirin 81 mg chewable tablet Take 1 Tablet by mouth daily. 4/30/22   Viri Dumont MD   QUEtiapine (SEROquel) 50 mg tablet Take 1 Tablet by mouth nightly. 4/29/22   Viri Dumont MD   memantine (Namenda) 10 mg tablet Take 10 mg by mouth two (2) times a day. Other, MD Nila   senna (Senna) 8.6 mg tablet Take 2 Tablets by mouth every six (6) hours as needed for Constipation. Indications: constipation    Marcia, MD Nila   acetaminophen (TylenoL) 325 mg tablet Take 325 mg by mouth every four (4) hours as needed for Pain. Indications: fever, pain    Nila Kennedy MD   vitamin a & d (A&D) ointment Apply  to affected area two (2) times a day. Apply to sacrum    Provider, Darion   sertraline (ZOLOFT) 50 mg tablet Days 1-7, take 25 mg daily. Starting day 8 and thereafter, 50 mg daily  Patient taking differently: 25 mg. Days 1-7, take 25 mg daily. Starting day 8 and thereafter, 50 mg daily  12/20/16   Kenneth Abad IV, MD   donepezil (ARICEPT) 10 mg tablet Take 1 Tab by mouth daily (after dinner). 11/22/16   Kenneth Abad IV, MD   simvastatin (ZOCOR) 5 mg tablet TAKE ONE (1) TABLET(S) DAILY AT BEDTIME 9/15/16   Kenneth Abad IV, MD     Allergies   Allergen Reactions    Lipitor [Atorvastatin] Other (comments)     arthralgia    Pcn [Penicillins] Unknown (comments)    Pravastatin Unknown (comments)      Family History   Problem Relation Age of Onset    Diabetes Mother     Heart Attack Mother     Heart Disease Father     Diabetes Brother     Heart Disease Brother     Cancer Brother       Social History:  reports that he has quit smoking. He does not have any smokeless tobacco history on file. He reports that he does not drink alcohol and does not use drugs.    Social Determinants of Health     Tobacco Use: Not on file   Alcohol Use: Not on file   Financial Resource Strain: Not on file   Food Insecurity: Not on file   Transportation Needs: Not on file   Physical Activity: Not on file   Stress: Not on file   Social Connections: Not on file   Intimate Partner Violence: Not on file   Depression: Not on file   Housing Stability: Not on file        Medications were reconciled to the best of my ability given all available resources at the time of admission. Route is PO if not otherwise noted. Family and social history were personally reviewed, all pertinent and relevant details are outlined as above.     Objective:   Visit Vitals  BP (!) 135/97 (BP 1 Location: Left upper arm, BP Patient Position: At rest;Sitting)   Pulse 98   Temp 98.2 °F (36.8 °C)   Resp 16   SpO2 97%      O2 Device: None    PHYSICAL EXAM:   General: Alertconfused speech, awake, no acute distress,   HEENT: PEERL, EOMI, moist mucus membranes  Neck: Supple, no JVD, no meningeal signs  Chest: Clear to auscultation bilaterally   CVS: RRR, S1 S2 heard, no murmurs/rubs/gallops  Abd: Soft, non-tender, non-distended, +bowel sounds   Ext: No clubbing, no cyanosis, no edema  Neuro/Psych: Pleasant mood and affect, CN 2-12 grossly intact, sensory grossly within normal limit, Strength 5/5 in all extremities,  Cap refill: Brisk, less than 3 seconds  Pulses: 2+radial pulses  Skin: Warm, dry, without rashes or lesions    Data Review:   I have independently reviewed and interpreted patient's lab and all other diagnostic data    Abnormal Labs Reviewed   METABOLIC PANEL, COMPREHENSIVE - Abnormal; Notable for the following components:       Result Value    Anion gap 3 (*)     Glucose 132 (*)     All other components within normal limits       All Micro Results       Procedure Component Value Units Date/Time    CULTURE, URINE [[id number]]     Order Status: Sent Specimen: Urine from Clean catch     URINE CULTURE HOLD SAMPLE [[id number]]     Order Status: Sent Specimen: Urine             IMAGING:   No orders to display        ECG/ECHO:    Results for orders placed or performed during the hospital encounter of 02/20/23   EKG, 12 LEAD, INITIAL   Result Value Ref Range    Ventricular Rate 52 BPM    Atrial Rate 52 BPM    P-R Interval 128 ms    QRS Duration 132 ms    Q-T Interval 466 ms    QTC Calculation (Bezet) 433 ms    Calculated P Axis 67 degrees    Calculated R Axis 76 degrees    Calculated T Axis 50 degrees    Diagnosis       Sinus bradycardia with premature supraventricular complexes  Right bundle branch block  Abnormal ECG  When compared with ECG of 25-APR-2022 07:24,  No significant change was found  Confirmed by Roddy Self M.D., Sunshine Woody ([id number]) on 2/20/2023 3:00:03 PM     Results for orders placed or performed in visit on 01/25/12   AMB POC EKG ROUTINE W/ 12 LEADS, INTER & REP    Narrative    Artifact. NSR. Old RBBB          Notes reviewed from all clinical/nonclinical/nursing services involved in patient's clinical care. Care coordination discussions were held with appropriate clinical/nonclinical/ nursing providers based on care coordination needs. Assessment:   Given the patient's current clinical presentation, there is a high level of concern for decompensation if discharged from the emergency department. Complex decision making was performed, which includes reviewing the patient's available past medical records, laboratory results, and imaging studies. Active Problems:    * No active hospital problems. *      Plan:     #Acute Encephalopathy on Chronic Dementia  -aggressive for the past 72 hours  -2/2 acute cystitis  -consult case management for placement  -stat CT head to eval for ocud  DIET: No diet orders on file   ISOLATION PRECAUTIONS: There are currently no Active Isolations  CODE STATUS: Prior   DVT PROPHYLAXIS: SCDs  ANTICIPATED DISCHARGE: 24-48 hours.   ANTICIPATED DISPOSITION: LTC  EMERGENCY CONTACT/SURROGATE DECISION MAKER: Torey Lee (son)    Signed By: Meri Mcneal MD     February 21, 2023         Please note that this dictation may have been completed with Dragon, the computer voice recognition software. Quite often unanticipated grammatical, syntax, homophones, and other interpretive errors are inadvertently transcribed by the computer software. Please disregard these errors. Please excuse any errors that have escaped final proofreading.

## 2023-02-22 NOTE — ED NOTES
Bedside and Verbal shift change report given to RN Silvestre Benavides (oncoming nurse) by Barrington Cao RN (offgoing nurse). Report included the following information SBAR, Kardex, ED Summary, Intake/Output, MAR, and Cardiac Rhythm   .

## 2023-02-22 NOTE — PROGRESS NOTES
[address] [facility]                                                                                          Hospitalist Progress Note  Keyona Young MD  Answering service: [phone] from in house phone        Date of Service:  2023  NAME:  Bailey Krishna  :  1933  MRN:  [id number]       Admission Summary:   Bailey Krishna is a 80 y.o. male with a pmhx BPH, melanoma, HTN, GERD, dyslipidemia, and pAF who presents from [facility] with altered mental status, and is being admitted for acute encephalopathy due to acute cystitis. History given by patients son who is at bedside. He states that at baseline pt is ambulatory independently, but has confused speech, but usually calm, but curses. He usually becomes aggressive when he has a UTI, and patient has been aggressive for the past 72 hours     In the ED, pressure was initially 95/59 with improvement to 135/97. Other vital signs were stable. Labs were significant for UA with positive nitrites, large leukocyte, >100 WBC, and 4+ bacteria. EKG showed sinus bradycardia with PAC's. In the ED, levaquin and haldol were ordered. Interval history / Subjective:   Patient is seen and examined at bedside this AM. Son present. Pt is alert but confused. Agitated and trying to get off bed.    Son is concerned about recurrent UTI's and also wants medications to help agitation to return to memory care unit  Discussed with nursing      Assessment & Plan:      #Acute Encephalopathy - possible UTI    Chronic Dementia  -aggressive for the past 72 hours  - CT head - negative  - urine cx pending   - c/w IV ceftriaxone     Dementia with behavioral disturbance   - c/w aricept and trazodone   - added seroquel daily and prn   - psychiatry consult placed   - restraints/ sitter as needed     Code status: DNR  Prophylaxis:  Lovenox   Care Plan discussed with: pt/ family, RN   Anticipated Disposition: [facility] [address]  Inpatient  Cardiac monitoring: Telemetry     Hospital Problems  Date Reviewed: 1/31/2017            Codes Class Noted POA    Acute encephalopathy ICD-10-CM: G93.40  ICD-9-CM: 348.30  2/21/2023 Unknown           Social Determinants of Health     Tobacco Use: Medium Risk    Smoking Tobacco Use: Former    Smokeless Tobacco Use: Unknown    Passive Exposure: Not on file   Alcohol Use: Not on file   Financial Resource Strain: Not on file   Food Insecurity: Not on file   Transportation Needs: Not on file   Physical Activity: Not on file   Stress: Not on file   Social Connections: Not on file   Intimate Partner Violence: Not on file   Depression: Not on file   Housing Stability: Not on file       Review of Systems:   Review of systems not obtained due to patient factors. Vital Signs:    Last 24hrs VS reviewed since prior progress note.  Most recent are:  Visit Vitals  /76 (BP 1 Location: Left upper arm)   Pulse 92   Temp 98 °F (36.7 °C)   Resp 18   Ht 6' (1.829 m)   Wt 89 kg (196 lb 3.4 oz)   SpO2 97%   BMI 26.61 kg/m²         Intake/Output Summary (Last 24 hours) at 2/22/2023 1507  Last data filed at 2/22/2023 1226  Gross per 24 hour   Intake 12 ml   Output 200 ml   Net -188 ml        Physical Examination:     I had a face to face encounter with this patient and independently examined them on 2/22/2023 as outlined below:          General : alert x 3, awake, no acute distress, agitated   HEENT: PEERL, EOMI, moist mucus membrane, TM clear  Neck: supple, no JVD, no meningeal signs  Chest: Clear to auscultation bilaterally   CVS: S1 S2 heard, Capillary refill less than 2 seconds  Abd: soft/ non tender, non distended, BS physiological,   Ext: no clubbing, no cyanosis, no edema, brisk 2+ DP pulses  Neuro/Psych: grossly normal   Skin: warm     Data Review:    I personally reviewed  Image      I have personally and independently reviewed all pertinent labs, diagnostic studies, imaging, and have provided independent interpretation of the same. Labs:     Recent Labs     02/22/23  0341 02/21/23  1540   WBC 11.6* 8.8   HGB 12.2 13.1   HCT 38.8 42.3    251     Recent Labs     02/22/23  0341 02/21/23  1540 02/20/23  1022    139 141   K 4.0 3.8 4.1    105 108   CO2 28 31 30   BUN 18 17 22*   CREA 0.75 0.83 0.95   GLU 88 132* 115*   CA 8.7 9.0 9.1     Recent Labs     02/21/23  1540 02/20/23  1022   ALT 21 19    111   TBILI 0.4 0.3   TP 7.2 6.7   ALB 3.9 3.6   GLOB 3.3 3.1     No results for input(s): INR, PTP, APTT, INREXT in the last 72 hours. No results for input(s): FE, TIBC, PSAT, FERR in the last 72 hours. No results found for: FOL, RBCF   No results for input(s): PH, PCO2, PO2 in the last 72 hours. No results for input(s): CPK, CKNDX, TROIQ in the last 72 hours.     No lab exists for component: CPKMB  No results found for: CHOL, CHOLX, CHLST, CHOLV, HDL, HDLP, LDL, LDLC, DLDLP, TGLX, TRIGL, TRIGP, CHHD, CHHDX  Lab Results   Component Value Date/Time    Glucose (POC) 109 04/26/2022 11:02 AM    Glucose (POC) 93 04/25/2022 07:22 AM    Glucose  (A) 10/09/2013 10:42 AM    Glucose  (A) 06/04/2013 10:16 AM    Glucose  (A) 09/26/2012 09:05 AM     Lab Results   Component Value Date/Time    Color YELLOW/STRAW 02/22/2023 08:43 AM    Appearance CLEAR 02/22/2023 08:43 AM    Specific gravity 1.014 02/22/2023 08:43 AM    pH (UA) 7.0 02/22/2023 08:43 AM    Protein Negative 02/22/2023 08:43 AM    Glucose Negative 02/22/2023 08:43 AM    Ketone Negative 02/22/2023 08:43 AM    Bilirubin Negative 02/22/2023 08:43 AM    Urobilinogen 0.2 02/22/2023 08:43 AM    Nitrites Negative 02/22/2023 08:43 AM    Leukocyte Esterase MODERATE (A) 02/22/2023 08:43 AM    Epithelial cells FEW 02/22/2023 08:43 AM    Bacteria Negative 02/22/2023 08:43 AM    WBC  02/22/2023 08:43 AM    RBC 0-5 02/22/2023 08:43 AM       Notes reviewed from all clinical/nonclinical/nursing services involved in patient's clinical care. Care coordination discussions were held with appropriate clinical/nonclinical/ nursing providers based on care coordination needs. Patients current active Medications were reviewed, considered, added and adjusted based on the clinical condition today. Home Medications were reconciled to the best of my ability given all available resources at the time of admission. Route is PO if not otherwise noted.       Admission Status:[id number]:::1}      Medications Reviewed:     Current Facility-Administered Medications   Medication Dose Route Frequency    traZODone (DESYREL) tablet 100 mg  100 mg Oral QHS    donepeziL (ARICEPT) tablet 10 mg  10 mg Oral PCD    QUEtiapine (SEROquel) tablet 50 mg  50 mg Oral QHS    haloperidol lactate (HALDOL) injection 4 mg  4 mg IntraMUSCular Q8H PRN    QUEtiapine (SEROquel) tablet 25 mg  25 mg Oral Q12H PRN    sodium chloride (NS) flush 5-40 mL  5-40 mL IntraVENous Q8H    sodium chloride (NS) flush 5-40 mL  5-40 mL IntraVENous PRN    acetaminophen (TYLENOL) tablet 650 mg  650 mg Oral Q6H PRN    Or    acetaminophen (TYLENOL) suppository 650 mg  650 mg Rectal Q6H PRN    polyethylene glycol (MIRALAX) packet 17 g  17 g Oral DAILY PRN    ondansetron (ZOFRAN ODT) tablet 4 mg  4 mg Oral Q8H PRN    Or    ondansetron (ZOFRAN) injection 4 mg  4 mg IntraVENous Q6H PRN    [Held by provider] enoxaparin (LOVENOX) injection 40 mg  40 mg SubCUTAneous DAILY    cefTRIAXone (ROCEPHIN) 1 g in 0.9% sodium chloride 10 mL IV syringe  1 g IntraVENous Q24H     ______________________________________________________________________  EXPECTED LENGTH OF STAY: 2d 19h  ACTUAL LENGTH OF STAY:          1                 Bernard Bear MD

## 2023-02-22 NOTE — PROGRESS NOTES
11:29 AM  Transition of Care Plan  RUR- 10%  DISPOSITION: [facility] [address]; pending medical progression  CM placed call to [facility] [phone] to inquire about patient return once he is medically stable. Awaiting a return call at this time from [place]   F/U with PCP/Specialist    Transport: Family    CM consult received for discharge planning and placement. Call placed to  at [address] [facility] at [facility] to inquire about patient return. No one present at the time of the call. CM left a message requesting a return call. CM will continue to follow and assist with MEENA needs as they arise. 3:39 PM  CM received a return call from [address] [phone] at Replaced by [facility] along with the DON on staff to review MEENA plan. CM Management on call as well. [facility] at this time would like for patient to stabilize on medication and show improved behaviors before considering patient return to their facility. Family is aware that if patient is unable to stabilize or show improved behaviors facility would not be able to receive patient back for services. The facilities goal is to work with patient and family however they are concerned about the safety and wellbeing of staff and other residents at this time. Psychiatry has been consulted and will be assessing patient as well. CM will continue to follow and assist with MEENA needs as they arise. Handoff provided to following CM at this time.     Sandy Galvan, MSW/CRM  Care Management

## 2023-02-22 NOTE — ED NOTES
Patient restless and ambulating around the room. Patient uncooperative with obtaining vital signs at this time. Patient's son at bedside.

## 2023-02-22 NOTE — PROGRESS NOTES
Problem: Non-Violent Restraints  Goal: No harm/injury to patient while restraints in use  Outcome: Not Progressing Towards Goal  Goal: Patient Interventions  Outcome: Not Progressing Towards Goal     Problem: Non-Violent Restraints  Goal: Patient's dignity will be maintained  Outcome: Progressing Towards Goal

## 2023-02-22 NOTE — ED NOTES
Patient voided in bed. Cleaned and applied condom catheter to collect urine specimen. Patient given 3 oz water and an applesauce.

## 2023-02-22 NOTE — BSMART NOTE
BSMART Liaison Team Note     LOS:  1     Patient goal(s) for today: unable to verbalize  [facility] Liaison team focus/goals: assess MH needs, provide education and support    Progress note: Pt came to ED 2/21/23 with son who reported increased agitation and aggression in Pt since being Dx with UTI. IP Psychiatry was consulted 2/22/23 d/t \"agitation. \"  Pt was resting in bed with son, Dewey Schulte, at bedside. He was alert, oriented to self only and restless. He had just been given PRN haldol per son. Pt has dementia and was unable to meaningfully engage in assessment. Pt was confused, talking nonsensically at times, trying to get out of bed, and unable to be oriented or redirected. Son, Dewey Schulte, was at bedside and provided history. Since developing a/ UTI Pt has become more aggressive. Pt has no MH Hx. He moved to [facility] 6 years ago and transition to memory care 3 years ago. Dewey Schulte expressed concerns about inability to care for father at home and fear that Pt wont be able to return to [employer]. Barriers to Discharge: medical  Guns in the home: no     Outpatient provider(s):  none  Insurance info/prescription coverage:  [place] MEDICARE/[place] MEDICARE PART A & B    Diagnosis: dementia    Plan:  Pt does not meet criteria for psychiatric admission. Refer to psychiatric consult note once completed for medication recommendation. Follow up Psych Consult placed? yes.    Psychiatrist updated? no       Participating treatment team members: Francesca Holden, MSW
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
## 2023-02-22 NOTE — ED NOTES
Patient voided in bed. Cleaned and repositioned. Replaced condom catheter to collect urine specimen.

## 2023-02-23 NOTE — PROGRESS NOTES
Primary Nurse Kamari Mayberry RN and Mehran Parry RN performed a dual skin assessment on this patient Impairment noted- see wound doc flow sheetpt has left wound -hand,dry skin

## 2023-02-23 NOTE — HOSPICE
[facility]  [phone]     Patient Name: Jake Muñoz  YOB: 1933  Age: 80 y.o. [facility] ANNE MARIE RN Note:  Hospice consult received, reviewing chart. Will follow up with Unit Nurse and Care Manager to discuss plan of care, patient status and discharge disposition within the hour. Meeting with son at 3:30 today for hospice information session. Thank you for the opportunity to be of service to this patient.      Adrianne Cullen RN  Clinical Nurse Liaison   [facility]  (N)[phone] (k) [phone]

## 2023-02-23 NOTE — PROGRESS NOTES
[facility] Adult  Hospitalist Group                                                                                          Hospitalist Progress Note  María Vizcaino MD  Answering service: [phone] from in house phone        Date of Service:  2023  NAME:  Kareem Sheikh  :  1933  MRN:  [id number]       Admission Summary:   Kareem Sheikh is a 80 y.o. male with a pmhx BPH, melanoma, HTN, GERD, dyslipidemia, and pAF who presents from [facility] with altered mental status, and is being admitted for acute encephalopathy due to acute cystitis. History given by patients son who is at bedside. He states that at baseline pt is ambulatory independently, but has confused speech, but usually calm, but curses. He usually becomes aggressive when he has a UTI, and patient has been aggressive for the past 72 hours     In the ED, pressure was initially 95/59 with improvement to 135/97. Other vital signs were stable. Labs were significant for UA with positive nitrites, large leukocyte, >100 WBC, and 4+ bacteria. EKG showed sinus bradycardia with PAC's. In the ED, levaquin and haldol were ordered. Interval history / Subjective:   Patient seen examined earlier bedside still agitated requiring restraints     Assessment & Plan:      #Acute Encephalopathy - possible UTI    Chronic Dementia  -Still intermittent breakthrough agitation  - CT head - negative  - urine cx no growth  - c/w IV ceftriaxone last dose     Dementia with behavioral disturbance   - c/w aricept and trazodone   - added seroquel daily and prn   - psychiatry consult placed awaiting evaluation  - restraints/ sitter as needed    I spoke to patient's son Ermias Mendez over the phone had extensive conversation about goals of care explained concept of hospice comfort care. He is in agreement for hospice. Hospice consult placed.  CM made aware      Code status: DNR  Prophylaxis:  Lovenox   Care Plan discussed with: pt/ family, RN   Anticipated Disposition: tbd possible hospice facility   Inpatient  Cardiac monitoring: Telemetry     Hospital Problems  Date Reviewed: 1/31/2017            Codes Class Noted POA    Acute encephalopathy ICD-10-CM: G93.40  ICD-9-CM: 348.30  2/21/2023 Unknown         Social Determinants of Health     Tobacco Use: Medium Risk    Smoking Tobacco Use: Former    Smokeless Tobacco Use: Unknown    Passive Exposure: Not on file   Alcohol Use: Not on file   Financial Resource Strain: Not on file   Food Insecurity: Not on file   Transportation Needs: Not on file   Physical Activity: Not on file   Stress: Not on file   Social Connections: Not on file   Intimate Partner Violence: Not on file   Depression: Not on file   Housing Stability: Not on file       Review of Systems:   Review of systems not obtained due to patient factors. Vital Signs:    Last 24hrs VS reviewed since prior progress note.  Most recent are:  Visit Vitals  /71 (BP 1 Location: Left upper arm, BP Patient Position: At rest)   Pulse (!) 59   Temp 97.8 °F (36.6 °C)   Resp 12   Ht 6' (1.829 m)   Wt 89 kg (196 lb 3.4 oz)   SpO2 94%   BMI 26.61 kg/m²         Intake/Output Summary (Last 24 hours) at 2/23/2023 1353  Last data filed at 2/23/2023 0915  Gross per 24 hour   Intake 790 ml   Output --   Net 790 ml          Physical Examination:     I had a face to face encounter with this patient and independently examined them on 2/23/2023 as outlined below:          General : alert x 0, awake, no acute distress, agitated   HEENT: PEERL, EOMI, moist mucus membrane, TM clear  Neck: supple, no JVD, no meningeal signs  Chest: Clear to auscultation bilaterally   CVS: S1 S2 heard, Capillary refill less than 2 seconds  Abd: soft/ non tender, non distended, BS physiological,   Ext: no clubbing, no cyanosis, no edema, brisk 2+ DP pulses  Neuro/Psych: grossly normal   Skin: warm     Data Review:    I personally reviewed  Image      I have personally and independently reviewed all pertinent labs, diagnostic studies, imaging, and have provided independent interpretation of the same. Labs:     Recent Labs     02/23/23 0123 02/22/23  0341   WBC 11.8* 11.6*   HGB 12.9 12.2   HCT 39.5 38.8    223       Recent Labs     02/23/23  0123 02/22/23  0341 02/21/23  1540    140 139   K 4.1 4.0 3.8    107 105   CO2 25 28 31   BUN 14 18 17   CREA 0.71 0.75 0.83   GLU 90 88 132*   CA 8.8 8.7 9.0       Recent Labs     02/21/23  1540   ALT 21      TBILI 0.4   TP 7.2   ALB 3.9   GLOB 3.3       No results for input(s): INR, PTP, APTT, INREXT, INREXT in the last 72 hours. No results for input(s): FE, TIBC, PSAT, FERR in the last 72 hours. No results found for: FOL, RBCF   No results for input(s): PH, PCO2, PO2 in the last 72 hours. No results for input(s): CPK, CKNDX, TROIQ in the last 72 hours.     No lab exists for component: CPKMB  No results found for: CHOL, CHOLX, CHLST, CHOLV, HDL, HDLP, LDL, LDLC, DLDLP, TGLX, TRIGL, TRIGP, CHHD, CHHDX  Lab Results   Component Value Date/Time    Glucose (POC) 109 04/26/2022 11:02 AM    Glucose (POC) 93 04/25/2022 07:22 AM    Glucose  (A) 10/09/2013 10:42 AM    Glucose  (A) 06/04/2013 10:16 AM    Glucose  (A) 09/26/2012 09:05 AM     Lab Results   Component Value Date/Time    Color YELLOW/STRAW 02/22/2023 08:43 AM    Appearance CLEAR 02/22/2023 08:43 AM    Specific gravity 1.014 02/22/2023 08:43 AM    pH (UA) 7.0 02/22/2023 08:43 AM    Protein Negative 02/22/2023 08:43 AM    Glucose Negative 02/22/2023 08:43 AM    Ketone Negative 02/22/2023 08:43 AM    Bilirubin Negative 02/22/2023 08:43 AM    Urobilinogen 0.2 02/22/2023 08:43 AM    Nitrites Negative 02/22/2023 08:43 AM    Leukocyte Esterase MODERATE (A) 02/22/2023 08:43 AM    Epithelial cells FEW 02/22/2023 08:43 AM    Bacteria Negative 02/22/2023 08:43 AM    WBC  02/22/2023 08:43 AM    RBC 0-5 02/22/2023 08:43 AM Notes reviewed from all clinical/nonclinical/nursing services involved in patient's clinical care. Care coordination discussions were held with appropriate clinical/nonclinical/ nursing providers based on care coordination needs. Patients current active Medications were reviewed, considered, added and adjusted based on the clinical condition today. Home Medications were reconciled to the best of my ability given all available resources at the time of admission. Route is PO if not otherwise noted.       Admission Status:[id number]:::1}      Medications Reviewed:     Current Facility-Administered Medications   Medication Dose Route Frequency    traZODone (DESYREL) tablet 100 mg  100 mg Oral QHS    donepeziL (ARICEPT) tablet 10 mg  10 mg Oral PCD    QUEtiapine (SEROquel) tablet 50 mg  50 mg Oral QHS    haloperidol lactate (HALDOL) injection 4 mg  4 mg IntraMUSCular Q8H PRN    QUEtiapine (SEROquel) tablet 25 mg  25 mg Oral Q12H PRN    sodium chloride (NS) flush 5-40 mL  5-40 mL IntraVENous Q8H    sodium chloride (NS) flush 5-40 mL  5-40 mL IntraVENous PRN    acetaminophen (TYLENOL) tablet 650 mg  650 mg Oral Q6H PRN    Or    acetaminophen (TYLENOL) suppository 650 mg  650 mg Rectal Q6H PRN    polyethylene glycol (MIRALAX) packet 17 g  17 g Oral DAILY PRN    ondansetron (ZOFRAN ODT) tablet 4 mg  4 mg Oral Q8H PRN    Or    ondansetron (ZOFRAN) injection 4 mg  4 mg IntraVENous Q6H PRN    enoxaparin (LOVENOX) injection 40 mg  40 mg SubCUTAneous DAILY    cefTRIAXone (ROCEPHIN) 1 g in 0.9% sodium chloride 10 mL IV syringe  1 g IntraVENous Q24H     ______________________________________________________________________  EXPECTED LENGTH OF STAY: 2d 19h  ACTUAL LENGTH OF STAY:          2                 Kerwin Chris MD

## 2023-02-23 NOTE — HOSPICE
[facility]  [phone]    Patient Name: Ángel Foster  YOB: 1933  Age: 80 y.o. [address] RN Note:  Hospice consult noted. Chart reviewed. Plan of care discussed with patients nurse & care manager. In to meet with sons Felisa Worthington and Martha Lovett. Discussed Hospice philosophy, general plan of care, levels of care, services and on call procedures. Family information packet provided & reviewed with family. Patient's sons shared that their mother passed under hospice care and they are very familiar with hospice. They do not want any aggressive measures and want to focus on comfort. They state their father would never want a feeding tube. At this time psych is following and adjusting medications to manage agitation. The sons are hopeful he will be able to return to [facility] with hospice. We have given them a list of medical group homes as a back up plan. They are open to the use of ativan if needed to manage his agitation. Hospice will follow daily and once disposition is determined we can plan for hospice admission. Thank you for the opportunity to be of service to this patient.      Sadia Escobar RN  Clinical Nurse Liaison   [address]  (F)[phone] (E) [phone]

## 2023-02-23 NOTE — PROGRESS NOTES
Bedside and Verbal shift change report given to [facility] DON[facility] Cal[facility] (oncoming nurse) by Laura Cason RN (offgoing nurse). Report included the following information SBAR, Kardex, Intake/Output, MAR, Recent Results, Cardiac Rhythm of NSR, and Alarm Parameters . Detail Level: Simple Price (Do Not Change): 0.00 Instructions: This plan will send the code FBSE to the PM system.  DO NOT or CHANGE the price.

## 2023-02-23 NOTE — PROGRESS NOTES
2010-Bedside shift change report given to Amber Arita (oncoming nurse) by OLIVIA Sanders (offgoing nurse). Report included the following information SBAR, Kardex, Procedure Summary, MAR, and Recent Results.

## 2023-02-23 NOTE — PROGRESS NOTES
Transition of Care  RUR 11% Low  Disposition TBD from [facility] [address]   DME None  Transportation BLS  Follow Up PCP, Specialist    CM received hospice consult. Call placed to son Lilliam Waters, [phone]. [place] is somewhat overwhelmed and upset about financial concerns and not being able to afford any additional care patient may need at this time CM provided support. [place] is agreeable to receiving hospice info from [facility]. CM placed referral. Please note, patient still in restraints and patient's return to [facility] is pending behavioral improvement. CM to monitor. Transition of Care Plan:     The Plan for Transition of Care is related to the following treatment goals: Hospice    The Patient and/or patient representative  was provided with a choice of provider and agrees  with the discharge plan. Yes [x] No []    A Freedom of choice list was provided with basic dialogue that supports the patient's individualized plan of care/goals and shares the quality data associated with the providers. Yes [x] No []    Claudia Grover MS     2:54 CM returned phone call to Marti White with [facility] [phone] and provided update. Per nurse, patient is less agitated today and an attempt will be made to trial patient without restraints. Psych has also adjusted meds. CM to monitor. 3:30 CM updated by [facility] that they can provide a list of medical group homes to patient's family as an alternative placement should [facility] decline to accept patient back. [facility] also verifies that they are contracted with [facility]. CM to monitor.      Claudia Grover, MS

## 2023-02-23 NOTE — CONSULTS
PSYCHIATRY CONSULT NOTE    REASON FOR CONSULT:medication adjustment with agitation      HISTORY OF PRESENTING COMPLAINT:  Loreatha Osler is a 80 y.o. WHITE/NON- male who is currently admitted to the medical floor at [facility]. Patient is admitted with a diagnosis of acute encephalopathy. He is sleeping and unable to participate in the assessment. He is in restraints. Son is at bedside and reports violent behavior, hitting on staffs and other patient at the memory care. Son reports patient has mild agitation and cursing at baseline but it worsens when he has a UTI. Son denies a hx of mental illness. Son is requesting for medication adjustments as he expressed concerns that [facility] might declined to take back the patient if his medications have not been adjusted. He states that he is not able to care for the patient at home. PAST PSYCHIATRIC HISTORY and SUBSTANCE ABUSE HISTORY:  PAT, Son denies    PAST MEDICAL HISTORY:    Please see H&P for details. Past Medical History:   Diagnosis Date    Bleeding duodenal ulcer     BPH (benign prostatic hyperplasia) 10/9/2013    Cancer ([facility] [address]     melanoma    Carpal tunnel syndrome     DJD (degenerative joint disease)     GERD (gastroesophageal reflux disease) 1/25/2012    Glucose intolerance (impaired glucose tolerance) 7/20/2011    HTN (hypertension) 7/20/2011    Hyperlipidemia 7/20/2011    Lumbar spinal stenosis     Mastoiditis     Memory changes 8/20/2015    PAF (paroxysmal atrial fibrillation) ([facility] [address] 7/20/2011    RBBB (right bundle branch block)     Zoster      Prior to Admission medications    Medication Sig Start Date End Date Taking? Authorizing Provider   cephALEXin (Keflex) 500 mg capsule Take 1 Capsule by mouth two (2) times a day for 7 days. 2/20/23 2/27/23  Zak Reid DO   aspirin 81 mg chewable tablet Take 1 Tablet by mouth daily. 4/30/22   Naomi Valiente MD   QUEtiapine (SEROquel) 50 mg tablet Take 1 Tablet by mouth nightly.  4/29/22   Tyrell Weinstein Amanda Cloud MD   memantine (Namenda) 10 mg tablet Take 10 mg by mouth two (2) times a day. Nila Kennedy MD   senna (Senna) 8.6 mg tablet Take 2 Tablets by mouth every six (6) hours as needed for Constipation. Indications: constipation    Nila Kennedy MD   acetaminophen (TylenoL) 325 mg tablet Take 325 mg by mouth every four (4) hours as needed for Pain. Indications: fever, pain    Nila Kennedy MD   vitamin a & d (A&D) ointment Apply  to affected area two (2) times a day. Apply to sacrum    Provider, Historical   sertraline (ZOLOFT) 50 mg tablet Days 1-7, take 25 mg daily. Starting day 8 and thereafter, 50 mg daily  Patient taking differently: 25 mg. Days 1-7, take 25 mg daily. Starting day 8 and thereafter, 50 mg daily  12/20/16   Jared Narayanan IV, MD   donepezil (ARICEPT) 10 mg tablet Take 1 Tab by mouth daily (after dinner).  11/22/16   Jared Narayanan IV, MD   simvastatin (ZOCOR) 5 mg tablet TAKE ONE (1) TABLET(S) DAILY AT BEDTIME 9/15/16   Jared Narayanan IV, MD     Vitals:    02/23/23 1000 02/23/23 1129 02/23/23 1230 02/23/23 1405   BP:  126/71     Pulse: 70 62 (!) 59 (!) 59   Resp:  12     Temp:  97.8 °F (36.6 °C)     SpO2:  94%     Weight:       Height:         Lab Results   Component Value Date/Time    WBC 11.8 (H) 02/23/2023 01:23 AM    HGB (POC) 13.6 12/20/2016 11:11 AM    HGB 12.9 02/23/2023 01:23 AM    HCT (POC) 41.3 12/20/2016 11:11 AM    HCT 39.5 02/23/2023 01:23 AM    PLATELET 589 53/46/2765 01:23 AM    MCV 92.7 02/23/2023 01:23 AM     Lab Results   Component Value Date/Time    Sodium 141 02/23/2023 01:23 AM    Potassium 4.1 02/23/2023 01:23 AM    Chloride 108 02/23/2023 01:23 AM    CO2 25 02/23/2023 01:23 AM    Anion gap 8 02/23/2023 01:23 AM    Glucose 90 02/23/2023 01:23 AM    BUN 14 02/23/2023 01:23 AM    Creatinine 0.71 02/23/2023 01:23 AM    BUN/Creatinine ratio 20 02/23/2023 01:23 AM    GFR est AA >60 04/29/2022 03:17 AM    GFR est non-AA >60 04/29/2022 03:17 AM    Calcium 8.8 02/23/2023 01:23 AM    Bilirubin, total 0.4 02/21/2023 03:40 PM    Alk. phosphatase 110 02/21/2023 03:40 PM    Protein, total 7.2 02/21/2023 03:40 PM    Albumin 3.9 02/21/2023 03:40 PM    Globulin 3.3 02/21/2023 03:40 PM    A-G Ratio 1.2 02/21/2023 03:40 PM    ALT (SGPT) 21 02/21/2023 03:40 PM    AST (SGOT) 22 02/21/2023 03:40 PM     No results found for: VALF2, VALAC, VALP, VALPR, DS6, CRBAM, CRBAMP, CARB2, XCRBAM  No results found for: LITHM  RADIOLOGY REPORTS:(reviewed/updated 2/23/2023)  CT HEAD WO CONT    Result Date: 2/22/2023  INDICATION: altered mental status EXAM:  HEAD CT WITHOUT CONTRAST COMPARISON: April 25, 2022 TECHNIQUE:  Routine noncontrast axial head CT was performed. Sagittal and coronal reconstructions were generated. CT dose reduction was achieved through use of a standardized protocol tailored for this examination and automatic exposure control for dose modulation. FINDINGS: Ventricles: Midline, no hydrocephalus. Intracranial Hemorrhage: None. Brain Parenchyma/Brainstem: Normal for age. Basal Cisterns: Normal. Paranasal Sinuses: Visualized sinuses are clear. Additional Comments: N/A. No acute process. US RETROPERITONEUM COMP    Result Date: 2/22/2023  RENAL AND BLADDER ULTRASOUND INDICATION: recurrent UTIs. COMPARISON: None. TECHNIQUE: Sonography of the kidneys, retroperitoneum, and bladder was performed. FINDINGS: RIGHT KIDNEY: measures 10.9 cm in length, normal for age. Echogenicity is mildly increased. No hydronephrosis, large shadowing calculus, or solid contour-deforming renal mass. A simple cyst in the upper pole measures 2.1 cm. There is probably a tiny nonobstructing calculus in the upper pole as well. LEFT KIDNEY: measures 9.6 cm in length, lower normal for age. Renal echogenicity is mildly increased. No hydronephrosis, or solid contour-deforming renal mass. Nonobstructing intrarenal calculus in the interpolar kidney measuring 8 mm. . AORTA: Not seen due to bowel gas.    COMMON ILIAC ARTERIES: Not seen due to bowel gas. IVC: Normal caliber in its visualized portions. BLADDER: The urinary bladder is moderately distended with a thick walled anechoic collection at the anterior dome of the bladder measuring 2.7 x 2.7 x 1.7 cm. There are no intraluminal masses or calculi. Bladder volume is 208 cc at time of this examination. 1. Increased echogenicity of kidneys. Correlate with renal function parameters for medical renal parenchymal disease. 2. Bilateral nonobstructing intrarenal calculi. 3. Right renal cortical cyst. 4. Small thick walled anechoic collection at the anterior dome of the urinary bladder most consistent with a bladder diverticulum in this age group. A urachal remnant cyst would be less likely. No results found for: [address], [id number], [id number], [id number], PREGU, POCHCG, MHCGN, HCGQR, THCGA1, SHCG, HCGN, HCGSERUM, HCGURQLPOC    PSYCHOSOCIAL HISTORY:Patient is a resident of [facility]. He has 2 sons. MENTAL STATUS EXAM:  General appearance:  fairly  groomed, psychomotor activity is decreased  Eye contact: Avoids eye contact  Speech: PAT  Affect : PAT  Mood:PAT  Thought Process: PAT  Perception: PAT   Thought Content: PAT  Insight: PAT  Judgement: PAT  Cognition: PAT    ASSESSMENT AND PLAN:  Chaz Corrigan meets criteria for a diagnosis of  delirium r/t metabolic encephalopathy, dementia with other behavioral disturbance . Will add seroquel 25mg daily and trazodone 25mg in the morning to target agitation and aggressive behavior. Continue with current prn medications. Hopefully when the UTI clears up, agitation will reduce. Monitor qtcs  Psych admission is not indicated at this time      Thank your your consult. Please feel free to consult us again as needed.

## 2023-02-24 NOTE — PROGRESS NOTES
Problem: Non-Violent Restraints  Goal: Removal from restraints as soon as assessed to be safe  Outcome: Resolved/Not Met  Goal: No harm/injury to patient while restraints in use  Outcome: Resolved/Not Met  Goal: Patient's dignity will be maintained  Outcome: Resolved/Not Met  Goal: Patient Interventions  Outcome: Resolved/Not Met

## 2023-02-24 NOTE — PROGRESS NOTES
Bedside and Verbal shift change report given to Maggie Ashby RN (oncoming nurse) by Mariposa Boudreaux RN (offgoing nurse). Report included the following information SBAR, Kardex, Intake/Output, MAR, Recent Results, and Alarm Parameters .  Patient remains on bilateral wrist restraints

## 2023-02-24 NOTE — PROGRESS NOTES
[facility]  Hospitalist Group                                                                                          Hospitalist Progress Note  Jose Cm MD  Answering service: [phone] from in house phone        Date of Service:  2023  NAME:  Sia Willard  :  1933  MRN:  [id number]       Admission Summary:   Sia Willard is a 80 y.o. male with a pmhx BPH, melanoma, HTN, GERD, dyslipidemia, and pAF who presents from [facility] with altered mental status, and is being admitted for acute encephalopathy due to acute cystitis. History given by patients son who is at bedside. He states that at baseline pt is ambulatory independently, but has confused speech, but usually calm, but curses. He usually becomes aggressive when he has a UTI, and patient has been aggressive for the past 72 hours     In the ED, pressure was initially 95/59 with improvement to 135/97. Other vital signs were stable. Labs were significant for UA with positive nitrites, large leukocyte, >100 WBC, and 4+ bacteria. EKG showed sinus bradycardia with PAC's. In the ED, levaquin and haldol were ordered. Interval history / Subjective:   Patient seen examined earlier bedside more alert awake today calmer but still confused      Assessment & Plan:      #Acute Encephalopathy  UTI    Chronic Dementia  -Still intermittent breakthrough agitation  - CT head - negative  - urine cx no growth  - completed IV ceftriaxone last dose     Dementia with behavioral disturbance   - c/w aricept and trazodone   -Appreciate psychiatry recs added Seroquel 25 daily and trazodone a.m.  - asked rn to remove restraints     I spoke to patient's son Kunal Sorensen over the phone had extensive conversation about goals of care explained concept of hospice comfort care . He is in agreement for hospice.   Hospice evaluated, plan for return to [facility] under hospice will have to be off restraints 24 hrs prior to dc and [facility] cannot take patient back over the weekend, will likely be dc'd Monday      Code status: DNR  Prophylaxis:  Lovenox   Care Plan discussed with: pt/ family, RN, cm   Anticipated Disposition: [facility] with hospice Monday   Inpatient  Cardiac monitoring: Telemetry     Hospital Problems  Date Reviewed: 1/31/2017            Codes Class Noted POA    Acute encephalopathy ICD-10-CM: G93.40  ICD-9-CM: 348.30  2/21/2023 Unknown         Social Determinants of Health     Tobacco Use: Medium Risk    Smoking Tobacco Use: Former    Smokeless Tobacco Use: Unknown    Passive Exposure: Not on file   Alcohol Use: Not on file   Financial Resource Strain: Not on file   Food Insecurity: Not on file   Transportation Needs: Not on file   Physical Activity: Not on file   Stress: Not on file   Social Connections: Not on file   Intimate Partner Violence: Not on file   Depression: Not on file   Housing Stability: Not on file       Review of Systems:   Review of systems not obtained due to patient factors. Vital Signs:    Last 24hrs VS reviewed since prior progress note.  Most recent are:  Visit Vitals  /75 (BP 1 Location: Left upper arm, BP Patient Position: At rest)   Pulse 88   Temp 97.1 °F (36.2 °C)   Resp 20   Ht 6' (1.829 m)   Wt 78.3 kg (172 lb 9.6 oz)   SpO2 94%   BMI 23.41 kg/m²         Intake/Output Summary (Last 24 hours) at 2/24/2023 1307  Last data filed at 2/23/2023 1943  Gross per 24 hour   Intake 240 ml   Output 300 ml   Net -60 ml          Physical Examination:     I had a face to face encounter with this patient and independently examined them on 2/24/2023 as outlined below:          General : alert x 0, awake, no acute distress, less agitated, confused   HEENT: PEERL, EOMI, moist mucus membrane, TM clear  Neck: supple, no JVD, no meningeal signs  Chest: Clear to auscultation bilaterally   CVS: S1 S2 heard, Capillary refill less than 2 seconds  Abd: soft/ non tender, non distended, BS physiological,   Ext: no clubbing, no cyanosis, no edema, brisk 2+ DP pulses  Neuro/Psych: grossly normal   Skin: warm     Data Review:    I personally reviewed  Image      I have personally and independently reviewed all pertinent labs, diagnostic studies, imaging, and have provided independent interpretation of the same. Labs:     Recent Labs     02/24/23  0324 02/23/23  0123   WBC 10.8 11.8*   HGB 13.9 12.9   HCT 44.3 39.5    236       Recent Labs     02/24/23  0324 02/23/23  0123 02/22/23  0341    141 140   K 4.4 4.1 4.0    108 107   CO2 28 25 28   BUN 17 14 18   CREA 0.81 0.71 0.75   GLU 89 90 88   CA 9.0 8.8 8.7       Recent Labs     02/21/23  1540   ALT 21      TBILI 0.4   TP 7.2   ALB 3.9   GLOB 3.3       No results for input(s): INR, PTP, APTT, INREXT, INREXT in the last 72 hours. No results for input(s): FE, TIBC, PSAT, FERR in the last 72 hours. No results found for: FOL, RBCF   No results for input(s): PH, PCO2, PO2 in the last 72 hours. No results for input(s): CPK, CKNDX, TROIQ in the last 72 hours.     No lab exists for component: CPKMB  No results found for: CHOL, CHOLX, CHLST, CHOLV, HDL, HDLP, LDL, LDLC, DLDLP, TGLX, TRIGL, TRIGP, CHHD, CHHDX  Lab Results   Component Value Date/Time    Glucose (POC) 109 04/26/2022 11:02 AM    Glucose (POC) 93 04/25/2022 07:22 AM    Glucose  (A) 10/09/2013 10:42 AM    Glucose  (A) 06/04/2013 10:16 AM    Glucose  (A) 09/26/2012 09:05 AM     Lab Results   Component Value Date/Time    Color YELLOW/STRAW 02/22/2023 08:43 AM    Appearance CLEAR 02/22/2023 08:43 AM    Specific gravity 1.014 02/22/2023 08:43 AM    pH (UA) 7.0 02/22/2023 08:43 AM    Protein Negative 02/22/2023 08:43 AM    Glucose Negative 02/22/2023 08:43 AM    Ketone Negative 02/22/2023 08:43 AM    Bilirubin Negative 02/22/2023 08:43 AM    Urobilinogen 0.2 02/22/2023 08:43 AM    Nitrites Negative 02/22/2023 08:43 AM    Leukocyte Esterase MODERATE (A) 02/22/2023 08:43 AM    Epithelial cells FEW 02/22/2023 08:43 AM    Bacteria Negative 02/22/2023 08:43 AM    WBC  02/22/2023 08:43 AM    RBC 0-5 02/22/2023 08:43 AM       Notes reviewed from all clinical/nonclinical/nursing services involved in patient's clinical care. Care coordination discussions were held with appropriate clinical/nonclinical/ nursing providers based on care coordination needs. Patients current active Medications were reviewed, considered, added and adjusted based on the clinical condition today. Home Medications were reconciled to the best of my ability given all available resources at the time of admission. Route is PO if not otherwise noted.       Admission Status:[id number]:::1}      Medications Reviewed:     Current Facility-Administered Medications   Medication Dose Route Frequency    QUEtiapine (SEROquel) tablet 25 mg  25 mg Oral DAILY    traZODone (DESYREL) tablet 25 mg  25 mg Oral DAILY    traZODone (DESYREL) tablet 100 mg  100 mg Oral QHS    donepeziL (ARICEPT) tablet 10 mg  10 mg Oral PCD    QUEtiapine (SEROquel) tablet 50 mg  50 mg Oral QHS    haloperidol lactate (HALDOL) injection 4 mg  4 mg IntraMUSCular Q8H PRN    QUEtiapine (SEROquel) tablet 25 mg  25 mg Oral Q12H PRN    sodium chloride (NS) flush 5-40 mL  5-40 mL IntraVENous Q8H    sodium chloride (NS) flush 5-40 mL  5-40 mL IntraVENous PRN    acetaminophen (TYLENOL) tablet 650 mg  650 mg Oral Q6H PRN    Or    acetaminophen (TYLENOL) suppository 650 mg  650 mg Rectal Q6H PRN    polyethylene glycol (MIRALAX) packet 17 g  17 g Oral DAILY PRN    ondansetron (ZOFRAN ODT) tablet 4 mg  4 mg Oral Q8H PRN    Or    ondansetron (ZOFRAN) injection 4 mg  4 mg IntraVENous Q6H PRN    enoxaparin (LOVENOX) injection 40 mg  40 mg SubCUTAneous DAILY     ______________________________________________________________________  EXPECTED LENGTH OF STAY: 2d 19h  ACTUAL LENGTH OF STAY:          3                 Kvng Zamora MD

## 2023-02-24 NOTE — PROGRESS NOTES
Physician Progress Note      PATIENT:               Blaine Miller  CSN #:                  [id number]  :                       1933  ADMIT DATE:       2023 6:22 PM  [address] [place] DATE:  RESPONDING  PROVIDER #:        Dawson Mojica MD          QUERY TEXT:    Noted documentation of Acute Cystitis in the Progress Notes. UC has returned negative, however pt had been started on Keflex as outpatient on the day prior to admission. In order to support the diagnosis of Acute Cystitis, please include additional clinical indicators in your documentation, or please document if this diagnosis has been ruled out after further study. The medical record reflects the following:  Risk Factors: nonobstructing kidney stones, hx of BPH  Clinical Indicators: admitted with AMS, increase agitation. VS on admission were 97, 100, 20, 95/ 59 WBC 8.8, U/A with moderate leukocytes. Pt was diagnosed with a UTI in ER on the day prior to admission and was started on Keflex as outpatient. UC has returned negative. Treatment: Rocephin IV    Thank you,  Vania Amanda RN  Clinical Documentation  [phone], or via Perfect Serve  Options provided:  -- Acute Cystitis present as evidenced by, Please document evidence. -- Acute Cystitis was ruled out  -- Other - I will add my own diagnosis  -- Disagree - Not applicable / Not valid  -- Disagree - Clinically unable to determine / Unknown  -- Refer to Clinical Documentation Reviewer    PROVIDER RESPONSE TEXT:    Acute Cystitis is present as evidenced by + ua    Query created by: Destini Sneed on 2023 3:35 PM      QUERY TEXT:    Pt admitted with confusion and increased agitation and has encephalopathy documented.  If possible, please document in progress notes and discharge summary further specificity regarding the type of encephalopathy:    The medical record reflects the following:  Risk Factors: possible underlying infection, Dementia  Clinical Indicators: admitted with increased confusion and worsening agitation beyond pts baseline. Encephalopathy is documented in the Progress Notes. Psych was consulted and have documented delirium r/t metabolic encephalopathy, as well as dementia with behavioral disturbance. Treatment: Haldol IM x 2 doses, Valium IV, Seroquel, Trazodone, IV ABX for possible underlying infetion, continued on home med of Aricept, soft restraints, 1:1 sitter, Psych consult    Thank you,  Allen Wooten RN  Clinical Documentation  [phone], or via Perfect Serve  Options provided:  -- Metabolic encephalopathy  -- Encephalopathy due to, please specify. -- Other - I will add my own diagnosis  -- Disagree - Not applicable / Not valid  -- Disagree - Clinically unable to determine / Unknown  -- Refer to Clinical Documentation Reviewer    PROVIDER RESPONSE TEXT:    This patient has metabolic encephalopathy.     Query created by: Sharyn Boxer on 2/23/2023 3:45 PM      Electronically signed by:  Fabiola Garzon MD 2/24/2023 5:16 PM

## 2023-02-24 NOTE — PROGRESS NOTES
Transition of Care  RUR 11% Low  Disposition [facility] with Hospice-pending acceptance  DME None  Transportation BLS  Follow Up PCP, Specialist    [facility] following. Patient pending return to [facility] with hospice pending behavioral improvement. Plan was to discontinue restraints as patient continues to show improvement today. CM to follow up next week. Per [facility], family has plans to tour some of the [facility] as this plan is a backup plan if patient can not return to [facility].      Grace Flynn,MS

## 2023-02-24 NOTE — PROGRESS NOTES
2000-Bedside shift change report given to Amber Arita (oncoming nurse) by OLIVIA Sanders (offgoing nurse). Report included the following information SBAR, Kardex, Procedure Summary, MAR, and Recent Results.

## 2023-02-24 NOTE — PROGRESS NOTES
TRANSFER - IN REPORT:    Verbal report received from DONNA Pat(name) on Enmanuel Mcdonnell  being received from 3N(unit) for routine progression of care      Report consisted of patients Situation, Background, Assessment and   Recommendations(SBAR). Information from the following report(s) SBAR, Kardex, MAR, and Recent Results was reviewed with the receiving nurse. Opportunity for questions and clarification was provided. Assessment completed upon patients arrival to unit and care assumed. 1940 Bedside and Verbal shift change report given to DONNA Osborn (oncoming nurse) by Rosalie Bowman (offgoing nurse). Report included the following information SBAR, Kardex, MAR, and Recent Results.

## 2023-02-25 NOTE — PROGRESS NOTES
Bedside and Verbal shift change report given to ,Manas Estrada (oncoming nurse) by Terry Schulz RN (offgoing nurse). Report given with Maxwell MCKOY and MAR.

## 2023-02-25 NOTE — PROGRESS NOTES
[facility]  Hospitalist [facility]                                                                                          Hospitalist Progress Note  Anjum Graf MD  Answering service: [phone] OR [phone] from in house phone        Date of Service:  2023  NAME:  Maryanne Robles  :  1933  MRN:  [id number]       Admission Summary:   Maryanne Robles is a 80 y.o. male with a pmhx BPH, melanoma, HTN, GERD, dyslipidemia, and pAF who presents from [facility] with altered mental status, and is being admitted for acute encephalopathy due to acute cystitis. History given by patients son who is at bedside. He states that at baseline pt is ambulatory independently, but has confused speech, but usually calm, but curses. He usually becomes aggressive when he has a UTI, and patient has been aggressive for the past 72 hours     In the ED, pressure was initially 95/59 with improvement to 135/97. Other vital signs were stable. Labs were significant for UA with positive nitrites, large leukocyte, >100 WBC, and 4+ bacteria. EKG showed sinus bradycardia with PAC's. In the ED, levaquin and haldol were ordered. Interval history / Subjective:   Patient seen examined earlier,  extremely confused kicked sitter at bedside      Assessment & Plan:      #Acute Encephalopathy  UTI    Chronic Dementia  -Still intermittent breakthrough agitation  - CT head - negative  - urine cx no growth  - completed IV ceftriaxone last dose     Dementia with behavioral disturbance   - c/w aricept and trazodone   -Appreciate psychiatry recs added Seroquel 25 daily and trazodone am, still remains psychotic, reconsulted psych, give prn haldol   -sitter at bedside     I spoke to patient's son Rick García over the phone had extensive conversation about goals of care explained concept of hospice comfort care . He is in agreement for hospice.   Hospice evaluated, plan for return to [facility] under hospice [facility] cannot take patient back over the weekend, will likely be dc'd Monday. Code status: DNR  Prophylaxis:  Lovenox   Care Plan discussed with: pt/ family, RN, cm   Anticipated Disposition: detention with hospice Monday   Inpatient  Cardiac monitoring: Telemetry     Hospital Problems  Date Reviewed: 1/31/2017            Codes Class Noted POA    Acute encephalopathy ICD-10-CM: G93.40  ICD-9-CM: 348.30  2/21/2023 Unknown         Social Determinants of Health     Tobacco Use: Medium Risk    Smoking Tobacco Use: Former    Smokeless Tobacco Use: Unknown    Passive Exposure: Not on file   Alcohol Use: Not on file   Financial Resource Strain: Not on file   Food Insecurity: Not on file   Transportation Needs: Not on file   Physical Activity: Not on file   Stress: Not on file   Social Connections: Not on file   Intimate Partner Violence: Not on file   Depression: Not on file   Housing Stability: Not on file       Review of Systems:   Review of systems not obtained due to patient factors. Vital Signs:    Last 24hrs VS reviewed since prior progress note.  Most recent are:  Visit Vitals  /77 (BP 1 Location: Left upper arm, BP Patient Position: At rest)   Pulse 67   Temp 97 °F (36.1 °C)   Resp 14   Ht 6' (1.829 m)   Wt 78.3 kg (172 lb 9.6 oz)   SpO2 (!) 80%   BMI 23.41 kg/m²         Intake/Output Summary (Last 24 hours) at 2/25/2023 1511  Last data filed at 2/24/2023 1701  Gross per 24 hour   Intake --   Output 50 ml   Net -50 ml          Physical Examination:     I had a face to face encounter with this patient and independently examined them on 2/25/2023 as outlined below:          General : alert x 0, awake, no acute distress, agitated, confused half off the bed mumbling nonsense   HEENT: PEERL, EOMI, moist mucus membrane, TM clear  Neck: supple, no JVD, no meningeal signs  Chest: Clear to auscultation bilaterally   CVS: S1 S2 heard, Capillary refill less than 2 seconds  Abd: soft/ non tender, non distended, BS physiological,   Ext: no clubbing, no cyanosis, no edema, brisk 2+ DP pulses  Neuro/Psych: grossly normal   Skin: warm     Data Review:    I personally reviewed  Image      I have personally and independently reviewed all pertinent labs, diagnostic studies, imaging, and have provided independent interpretation of the same. Labs:     Recent Labs     02/24/23 0324 02/23/23 0123   WBC 10.8 11.8*   HGB 13.9 12.9   HCT 44.3 39.5    236       Recent Labs     02/24/23 0324 02/23/23 0123    141   K 4.4 4.1    108   CO2 28 25   BUN 17 14   CREA 0.81 0.71   GLU 89 90   CA 9.0 8.8       No results for input(s): ALT, AP, TBIL, TBILI, TP, ALB, GLOB, GGT, AML, LPSE in the last 72 hours. No lab exists for component: SGOT, GPT, AMYP, HLPSE    No results for input(s): INR, PTP, APTT, INREXT, INREXT in the last 72 hours. No results for input(s): FE, TIBC, PSAT, FERR in the last 72 hours. No results found for: FOL, RBCF   No results for input(s): PH, PCO2, PO2 in the last 72 hours. No results for input(s): CPK, CKNDX, TROIQ in the last 72 hours.     No lab exists for component: CPKMB  No results found for: CHOL, CHOLX, CHLST, CHOLV, HDL, HDLP, LDL, LDLC, DLDLP, TGLX, TRIGL, TRIGP, CHHD, CHHDX  Lab Results   Component Value Date/Time    Glucose (POC) 109 04/26/2022 11:02 AM    Glucose (POC) 93 04/25/2022 07:22 AM    Glucose  (A) 10/09/2013 10:42 AM    Glucose  (A) 06/04/2013 10:16 AM    Glucose  (A) 09/26/2012 09:05 AM     Lab Results   Component Value Date/Time    Color YELLOW/STRAW 02/22/2023 08:43 AM    Appearance CLEAR 02/22/2023 08:43 AM    Specific gravity 1.014 02/22/2023 08:43 AM    pH (UA) 7.0 02/22/2023 08:43 AM    Protein Negative 02/22/2023 08:43 AM    Glucose Negative 02/22/2023 08:43 AM    Ketone Negative 02/22/2023 08:43 AM    Bilirubin Negative 02/22/2023 08:43 AM    Urobilinogen 0.2 02/22/2023 08:43 AM    Nitrites Negative 02/22/2023 08:43 AM    Leukocyte Esterase MODERATE (A) 02/22/2023 08:43 AM    Epithelial cells FEW 02/22/2023 08:43 AM    Bacteria Negative 02/22/2023 08:43 AM    WBC  02/22/2023 08:43 AM    RBC 0-5 02/22/2023 08:43 AM       Notes reviewed from all clinical/nonclinical/nursing services involved in patient's clinical care. Care coordination discussions were held with appropriate clinical/nonclinical/ nursing providers based on care coordination needs. Patients current active Medications were reviewed, considered, added and adjusted based on the clinical condition today. Home Medications were reconciled to the best of my ability given all available resources at the time of admission. Route is PO if not otherwise noted.       Admission Status:[id number]:::1}      Medications Reviewed:     Current Facility-Administered Medications   Medication Dose Route Frequency    QUEtiapine (SEROquel) tablet 25 mg  25 mg Oral DAILY    traZODone (DESYREL) tablet 25 mg  25 mg Oral DAILY    traZODone (DESYREL) tablet 100 mg  100 mg Oral QHS    donepeziL (ARICEPT) tablet 10 mg  10 mg Oral PCD    QUEtiapine (SEROquel) tablet 50 mg  50 mg Oral QHS    haloperidol lactate (HALDOL) injection 4 mg  4 mg IntraMUSCular Q8H PRN    QUEtiapine (SEROquel) tablet 25 mg  25 mg Oral Q12H PRN    sodium chloride (NS) flush 5-40 mL  5-40 mL IntraVENous Q8H    sodium chloride (NS) flush 5-40 mL  5-40 mL IntraVENous PRN    acetaminophen (TYLENOL) tablet 650 mg  650 mg Oral Q6H PRN    Or    acetaminophen (TYLENOL) suppository 650 mg  650 mg Rectal Q6H PRN    polyethylene glycol (MIRALAX) packet 17 g  17 g Oral DAILY PRN    ondansetron (ZOFRAN ODT) tablet 4 mg  4 mg Oral Q8H PRN    Or    ondansetron (ZOFRAN) injection 4 mg  4 mg IntraVENous Q6H PRN    enoxaparin (LOVENOX) injection 40 mg  40 mg SubCUTAneous DAILY     ______________________________________________________________________  EXPECTED LENGTH OF STAY: 2d 19h  ACTUAL LENGTH OF STAY:          4                 Sue Fortunato Dey MD

## 2023-02-25 NOTE — BSMART NOTE
BSM[facility] Liaison Team Note     LOS:  4     Patient goal(s) for today: continue prescribed medications, communicate needs to staff  [facility] Liaison team focus/goals: assess MH needs, provide support and education    Progress note: presented to ED from [facility] due to behavior change and diagnosis of UTI one day prior. Admitted to medical for acute encephalopathy due to acute cystitis. Pt is received eating lunch with assistance from sitter. He is alert but currently unable to engage in logical, goal-directed conversation. According to pt's chart, he will be returning to [facility] with hospice care, once medically cleared. Barriers to Discharge: medical clearance  Guns in the home: no     Outpatient provider(s):  NA  Insurance info/prescription coverage:  [place] MEDICARE/VA MEDICARE PART A & B; AARP/BSHSI AARP SUPPLEMENT MEDICARE    Diagnosis: acute encephalopathy, dementia     Plan:  will return to [facility] with hospice care.    Follow up Psych Consult placed? no.   Psychiatrist updated? no       Participating treatment team members: Radha Luna, SELAM

## 2023-02-25 NOTE — HOSPICE
[facility]  [phone]    Nursing Note   Patient Name: Herminio Fleming  YOB: 1933  Age[facility] 80 [facility]. [facility]  Chart reviewed. Plan of care discussed with patients nurse & care manager. Pt is stable, alert to self, eats and takes po meds and does not meet inpatient criteria at this time. He requires 1:1 sitter for safety as restless at times. Appetite good. Will continue to be available to assess as needed.       Cam Mae, [facility]  [phone]

## 2023-02-26 NOTE — PROGRESS NOTES
Bedside and Verbal shift change report given to DONNA Bundy (oncoming nurse) by Tee Lynn (offgoing nurse). Report included the following information SBAR, Kardex, MAR, and Recent Results.

## 2023-02-26 NOTE — PROGRESS NOTES
[address] [facility] Adult  Hospitalist [facility]                                                                                          Hospitalist Progress Note  Elvis Mcrae MD  Answering service: [phone] from in house phone        Date of Service:  2023  NAME:  Ariadne Diego  :  1933  MRN:  [id number]       Admission Summary:   Ariadne Diego is a 80 y.o. male with a pmhx BPH, melanoma, HTN, GERD, dyslipidemia, and pAF who presents from [facility] with altered mental status, and is being admitted for acute encephalopathy due to acute cystitis. History given by patients son who is at bedside. He states that at baseline pt is ambulatory independently, but has confused speech, but usually calm, but curses. He usually becomes aggressive when he has a UTI, and patient has been aggressive for the past 72 hours     In the ED, pressure was initially 95/59 with improvement to 135/97. Other vital signs were stable. Labs were significant for UA with positive nitrites, large leukocyte, >100 WBC, and 4+ bacteria. EKG showed sinus bradycardia with PAC's. In the ED, levaquin and haldol were ordered. Interval history / Subjective:   Patient seen examined earlier,  still confused      Assessment & Plan:      #Acute Encephalopathy  UTI    Chronic Dementia  -Still intermittent breakthrough agitation  - CT head - negative  - urine cx no growth  - completed IV ceftriaxone last dose     Dementia with behavioral disturbance   - c/w aricept and trazodone   -Appreciate psychiatry recs added Seroquel 25 daily and trazodone am, still remains psychotic, reconsulted psych still awaiting evaluation, give prn haldol   -sitter at bedside     I spoke to patient's son Saira Bojorquez over the phone had extensive conversation about goals of care explained concept of hospice comfort care . He is in agreement for hospice.   Hospice evaluated, plan for return to [facility] under hospice savanah cannot take patient back over the weekend, will likely be dc'd Monday. Code status: DNR  Prophylaxis:  Lovenox   Care Plan discussed with: pt/ family, RN, cm   Anticipated Disposition: USP with hospice Monday   Inpatient  Cardiac monitoring: Telemetry     Hospital Problems  Date Reviewed: 1/31/2017            Codes Class Noted POA    Acute encephalopathy ICD-10-CM: G93.40  ICD-9-CM: 348.30  2/21/2023 Unknown         Social Determinants of Health     Tobacco Use: Medium Risk    Smoking Tobacco Use: Former    Smokeless Tobacco Use: Unknown    Passive Exposure: Not on file   Alcohol Use: Not on file   Financial Resource Strain: Not on file   Food Insecurity: Not on file   Transportation Needs: Not on file   Physical Activity: Not on file   Stress: Not on file   Social Connections: Not on file   Intimate Partner Violence: Not on file   Depression: Not on file   Housing Stability: Not on file       Review of Systems:   Review of systems not obtained due to patient factors. Vital Signs:    Last 24hrs VS reviewed since prior progress note.  Most recent are:  Visit Vitals  BP (!) 118/92 (BP 1 Location: Right upper arm, BP Patient Position: At rest)   Pulse 76   Temp 98.1 °F (36.7 °C)   Resp 18   Ht 6' (1.829 m)   Wt 78.3 kg (172 lb 9.6 oz)   SpO2 98%   BMI 23.41 kg/m²       No intake or output data in the 24 hours ending 02/26/23 1329       Physical Examination:     I had a face to face encounter with this patient and independently examined them on 2/26/2023 as outlined below:          General : alert x 0, awake, no acute distress, agitated, confused, mumbling nonsense   HEENT: PEERL, EOMI, moist mucus membrane, TM clear  Neck: supple, no JVD, no meningeal signs  Chest: Clear to auscultation bilaterally   CVS: S1 S2 heard, Capillary refill less than 2 seconds  Abd: soft/ non tender, non distended, BS physiological,   Ext: no clubbing, no cyanosis, no edema, brisk 2+ DP pulses  Neuro/Psych: CN II to 12 grossly intact  Skin: warm     Data Review:    I personally reviewed  Image      I have personally and independently reviewed all pertinent labs, diagnostic studies, imaging, and have provided independent interpretation of the same. Labs:     Recent Labs     02/24/23 0324   WBC 10.8   HGB 13.9   HCT 44.3          Recent Labs     02/24/23 0324      K 4.4      CO2 28   BUN 17   CREA 0.81   GLU 89   CA 9.0       No results for input(s): ALT, AP, TBIL, TBILI, TP, ALB, GLOB, GGT, AML, LPSE in the last 72 hours. No lab exists for component: SGOT, GPT, AMYP, HLPSE    No results for input(s): INR, PTP, APTT, INREXT, INREXT in the last 72 hours. No results for input(s): FE, TIBC, PSAT, FERR in the last 72 hours. No results found for: FOL, RBCF   No results for input(s): PH, PCO2, PO2 in the last 72 hours. No results for input(s): CPK, CKNDX, TROIQ in the last 72 hours.     No lab exists for component: CPKMB  No results found for: CHOL, CHOLX, CHLST, CHOLV, HDL, HDLP, LDL, LDLC, DLDLP, TGLX, TRIGL, TRIGP, CHHD, CHHDX  Lab Results   Component Value Date/Time    Glucose (POC) 109 04/26/2022 11:02 AM    Glucose (POC) 93 04/25/2022 07:22 AM    Glucose  (A) 10/09/2013 10:42 AM    Glucose  (A) 06/04/2013 10:16 AM    Glucose  (A) 09/26/2012 09:05 AM     Lab Results   Component Value Date/Time    Color YELLOW/STRAW 02/22/2023 08:43 AM    Appearance CLEAR 02/22/2023 08:43 AM    Specific gravity 1.014 02/22/2023 08:43 AM    pH (UA) 7.0 02/22/2023 08:43 AM    Protein Negative 02/22/2023 08:43 AM    Glucose Negative 02/22/2023 08:43 AM    Ketone Negative 02/22/2023 08:43 AM    Bilirubin Negative 02/22/2023 08:43 AM    Urobilinogen 0.2 02/22/2023 08:43 AM    Nitrites Negative 02/22/2023 08:43 AM    Leukocyte Esterase MODERATE (A) 02/22/2023 08:43 AM    Epithelial cells FEW 02/22/2023 08:43 AM    Bacteria Negative 02/22/2023 08:43 AM    WBC  02/22/2023 08:43 AM    RBC 0-5 02/22/2023 08:43 AM       Notes reviewed from all clinical/nonclinical/nursing services involved in patient's clinical care. Care coordination discussions were held with appropriate clinical/nonclinical/ nursing providers based on care coordination needs. Patients current active Medications were reviewed, considered, added and adjusted based on the clinical condition today. Home Medications were reconciled to the best of my ability given all available resources at the time of admission. Route is PO if not otherwise noted.       Admission Status:[id number]:::1}      Medications Reviewed:     Current Facility-Administered Medications   Medication Dose Route Frequency    QUEtiapine (SEROquel) tablet 25 mg  25 mg Oral DAILY    traZODone (DESYREL) tablet 25 mg  25 mg Oral DAILY    traZODone (DESYREL) tablet 100 mg  100 mg Oral QHS    donepeziL (ARICEPT) tablet 10 mg  10 mg Oral PCD    QUEtiapine (SEROquel) tablet 50 mg  50 mg Oral QHS    haloperidol lactate (HALDOL) injection 4 mg  4 mg IntraMUSCular Q8H PRN    QUEtiapine (SEROquel) tablet 25 mg  25 mg Oral Q12H PRN    sodium chloride (NS) flush 5-40 mL  5-40 mL IntraVENous Q8H    sodium chloride (NS) flush 5-40 mL  5-40 mL IntraVENous PRN    acetaminophen (TYLENOL) tablet 650 mg  650 mg Oral Q6H PRN    Or    acetaminophen (TYLENOL) suppository 650 mg  650 mg Rectal Q6H PRN    polyethylene glycol (MIRALAX) packet 17 g  17 g Oral DAILY PRN    ondansetron (ZOFRAN ODT) tablet 4 mg  4 mg Oral Q8H PRN    Or    ondansetron (ZOFRAN) injection 4 mg  4 mg IntraVENous Q6H PRN    enoxaparin (LOVENOX) injection 40 mg  40 mg SubCUTAneous DAILY     ______________________________________________________________________  EXPECTED LENGTH OF STAY: 2d 19h  ACTUAL LENGTH OF STAY:          5                 Jose Cm MD
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
## 2023-02-27 NOTE — PROGRESS NOTES
Transition of Care: Patient resides at the [facility] in [facility]. Family would like patient to return to the [facility] with hospice services. [facility]. Noted patient currently has sitter. Facility is reviewing clinicals and plans to come to the hospital to do a bedside assessment to determine if patient is appropriate to return to the [facility]. Noted family is exploring options for medical group homes as back-up plan if [facility] is unable to accept patient back. Sons are Mao Saunders, [phone] and Fletcher Magallon #[phone]    Chart reviewed. Noted patient transfer to [place] [id number]. NETO called Marguerite Crook with [facility] [phone], he is out of the office. NETO called the other Montrell Monzon #[phone] and left VM message. NETO updated Jada with [employer]. NETO will continue to follow. NETO spoke with Jj Villagomez with the [facility]. She is covering for Marguerite Crook who should be back tomorrow. CM faxed updated clinicals to # [phone]. They will review and also come to the hospital to do a bedside assessment. NETO met with the son Analy See at bedside and gave update, CM will follow.      Laurie Chavez, BSW/CRM

## 2023-02-27 NOTE — PROGRESS NOTES
Problem: Falls - Risk of  Goal: *Absence of Falls  Description: Document Anthony Olivares Fall Risk and appropriate interventions in the flowsheet.   Outcome: Progressing Towards Goal  Note: Fall Risk Interventions:  Mobility Interventions: Bed/chair exit alarm    Mentation Interventions: Bed/chair exit alarm    Medication Interventions: Bed/chair exit alarm    Elimination Interventions: Bed/chair exit alarm              Problem: Hypertension  Goal: *Blood pressure within specified parameters  Outcome: Progressing Towards Goal

## 2023-02-27 NOTE — PROGRESS NOTES
[facility] [facility] Adult  Hospitalist Group                                                                                          Hospitalist Progress Note  Olu Loenard NP  Answering service: [phone] from in house phone        Date of Service:  2023  NAME:  Ángel Foster  :  1933  MRN:  [id number]       Admission Summary:   Ángel Foster is a 80 y.o. male with a PMH of BPH, Melanoma, HTN, GERD, HLD, and AFIB who presented from [address] with AMS and aggressive behavior x3 days and was admitted for acute encephalopathy 2/2 to Acute Cystitis. History given by patients son who is at bedside, reported baseline patient is ambulatory independently, but has confused speech, is calm but curses and is aggressive when he has a UTI. Interval history / Subjective: Follow-up for issues listed below.   -Patient seen and examined, no c/o's. Assessment & Plan:     Acute Encephalopathy  to UTI/ Chronic Dementia: intermittent breakthrough agitation. - CT Head wo :no acute process.   - Ur  - IV ABT ceftriaxone completed     Dementia with behavioral disturbance: Comfort Measures  - continue aricept and trazodone   - Psych  - sitter at bedside   - admit to hospice, out patient    DVTppx: lovenox  GIppx: Pepcid  Code Status: Full Code  Diet: dysphagia  Activity: OOB to chair TID and PRN  Discharge: TBD  Ambulates: independent  Admission Status: IP  Cardiac monitoring: None     Hospital Problems  Date Reviewed: 2017            Codes Class Noted POA    Acute encephalopathy ICD-10-CM: G93.40  ICD-9-CM: 348.30  2023 Unknown         Social Determinants of Health     Tobacco Use: Medium Risk    Smoking Tobacco Use: Former    Smokeless Tobacco Use: Unknown    Passive Exposure: Not on file   Alcohol Use: Not on file   Financial Resource Strain: Not on file   Food Insecurity: Not on file   Transportation Needs: Not on file   Physical Activity: Not on file   Stress: Not on file   Social Connections: Not on file   Intimate Partner Violence: Not on file   Depression: Not on file   Housing Stability: Not on file       Review of Systems:   Review of systems not obtained due to patient factors. Vital Signs:    Last 24hrs VS reviewed since prior progress note. Most recent are:  Visit Vitals  BP (!) 149/74 (BP 1 Location: Right upper arm, BP Patient Position: At rest)   Pulse 72   Temp 97.5 °F (36.4 °C)   Resp 16   Ht 6' (1.829 m)   Wt 78.3 kg (172 lb 9.6 oz)   SpO2 96%   BMI 23.41 kg/m²       No intake or output data in the 24 hours ending 02/27/23 1731       Physical Examination:     I had a face to face encounter with this patient and independently examined them on 2/27/2023 as outlined below:    Constitutional:  No acute distress, cooperative, pleasant    ENT:  Oral mucosa moist.    Resp:  CTA bilaterally. No wheezing/rhonchi/rales. No accessory muscle use. CV:  Regular rhythm, normal rate, S1,S2.    GI/:  Soft, non distended, non tender, no guarding, BS present. Voids Freely. Musculoskeletal:  No edema, warm. Neurologic:  Moves all extremities. Awake and alert, oriented to person. Skin:  w/d. Psych:  Not anxious nor agitated. Data Review:    I personally reviewed  Image      I have personally and independently reviewed all pertinent labs, diagnostic studies, imaging, and have provided independent interpretation of the same. Labs:     No results for input(s): WBC, HGB, HCT, PLT, HGBEXT, HCTEXT, PLTEXT, HGBEXT, HCTEXT, PLTEXT in the last 72 hours. No results for input(s): NA, K, CL, CO2, BUN, CREA, GLU, CA, MG, PHOS, URICA in the last 72 hours. No results for input(s): ALT, AP, TBIL, TBILI, TP, ALB, GLOB, GGT, AML, LPSE in the last 72 hours. No lab exists for component: SGOT, GPT, AMYP, HLPSE    No results for input(s): INR, PTP, APTT, INREXT, INREXT in the last 72 hours.    No results for input(s): FE, TIBC, PSAT, FERR in the last 72 hours. No results found for: FOL, RBCF   No results for input(s): PH, PCO2, PO2 in the last 72 hours. No results for input(s): CPK, CKNDX, TROIQ in the last 72 hours. No lab exists for component: CPKMB  No results found for: CHOL, CHOLX, CHLST, CHOLV, HDL, HDLP, LDL, LDLC, DLDLP, TGLX, TRIGL, TRIGP, CHHD, CHHDX  Lab Results   Component Value Date/Time    Glucose (POC) 109 04/26/2022 11:02 AM    Glucose (POC) 93 04/25/2022 07:22 AM    Glucose  (A) 10/09/2013 10:42 AM    Glucose  (A) 06/04/2013 10:16 AM    Glucose  (A) 09/26/2012 09:05 AM     Lab Results   Component Value Date/Time    Color YELLOW/STRAW 02/22/2023 08:43 AM    Appearance CLEAR 02/22/2023 08:43 AM    Specific gravity 1.014 02/22/2023 08:43 AM    pH (UA) 7.0 02/22/2023 08:43 AM    Protein Negative 02/22/2023 08:43 AM    Glucose Negative 02/22/2023 08:43 AM    Ketone Negative 02/22/2023 08:43 AM    Bilirubin Negative 02/22/2023 08:43 AM    Urobilinogen 0.2 02/22/2023 08:43 AM    Nitrites Negative 02/22/2023 08:43 AM    Leukocyte Esterase MODERATE (A) 02/22/2023 08:43 AM    Epithelial cells FEW 02/22/2023 08:43 AM    Bacteria Negative 02/22/2023 08:43 AM    WBC  02/22/2023 08:43 AM    RBC 0-5 02/22/2023 08:43 AM       Notes reviewed from all clinical/nonclinical/nursing services involved in patient's clinical care. Care coordination discussions were held with appropriate clinical/nonclinical/ nursing providers based on care coordination needs. Patients current active Medications were reviewed, considered, added and adjusted based on the clinical condition today. Home Medications were reconciled to the best of my ability given all available resources at the time of admission. Route is PO if not otherwise noted.       Admission Status:[id number]:::1}      Medications Reviewed:     Current Facility-Administered Medications   Medication Dose Route Frequency    sertraline (ZOLOFT) tablet 25 mg  25 mg Oral DAILY    traZODone (DESYREL) tablet 25 mg  25 mg Oral BID    traZODone (DESYREL) tablet 25 mg  25 mg Oral Q6H PRN    melatonin tablet 3 mg  3 mg Oral QHS    traZODone (DESYREL) tablet 100 mg  100 mg Oral QHS    donepeziL (ARICEPT) tablet 10 mg  10 mg Oral PCD    QUEtiapine (SEROquel) tablet 50 mg  50 mg Oral QHS    haloperidol lactate (HALDOL) injection 4 mg  4 mg IntraMUSCular Q8H PRN    sodium chloride (NS) flush 5-40 mL  5-40 mL IntraVENous Q8H    sodium chloride (NS) flush 5-40 mL  5-40 mL IntraVENous PRN    acetaminophen (TYLENOL) tablet 650 mg  650 mg Oral Q6H PRN    Or    acetaminophen (TYLENOL) suppository 650 mg  650 mg Rectal Q6H PRN    polyethylene glycol (MIRALAX) packet 17 g  17 g Oral DAILY PRN    ondansetron (ZOFRAN ODT) tablet 4 mg  4 mg Oral Q8H PRN    Or    ondansetron (ZOFRAN) injection 4 mg  4 mg IntraVENous Q6H PRN    enoxaparin (LOVENOX) injection 40 mg  40 mg SubCUTAneous DAILY     ______________________________________________________________________  EXPECTED LENGTH OF STAY: 2d 19h  ACTUAL LENGTH OF STAY:          6                 Sarah Dumont NP

## 2023-02-28 NOTE — PROGRESS NOTES
Bedside and Verbal shift change report given to ShannanBischavo (oncoming nurse) by Jayashree Ibrahim (offgoing nurse). Report included the following information SBAR and MAR.

## 2023-02-28 NOTE — PROGRESS NOTES
[facility]ist [facility]                                                                                          Hospitalist Progress Note  Ford Drummond NP  Answering service: [phone] OR [phone] from in house phone        Date of Service:  2023  NAME:  Seymour Cervantes  :  1933  MRN:  [id number]       Admission Summary:   Seymour Cervantes is a 80 y.o. male with a PMH of BPH, Melanoma, HTN, GERD, HLD, and AFIB who presented from [address] with AMS and aggressive behavior x3 days and was admitted for acute encephalopathy 2/2 to Acute Cystitis. History given by patients son who is at bedside, reported baseline patient is ambulatory independently, but has confused speech, is calm but curses and is aggressive when he has a UTI. Interval history / Subjective: Follow-up for issues listed below.   -Patient seen and examined, no c/o's. Assessment & Plan:     Acute Encephalopathy 22 to UTI/ Chronic Dementia: intermittent breakthrough agitation. - CT Head wo :no acute process. - Ur :  Moderate leukocytes  - IV ABT ceftriaxone completed     Dementia with behavioral disturbance: Comfort Measures  - continue aricept and trazodone   - Psych  - sitter at bedside   - admit to hospice, out patient    DVTppx: lovenox  GIppx: Pepcid  Code Status: Full Code  Diet: dysphagia  Activity: OOB to chair TID and PRN  Discharge: TBD  Ambulates: independent  Admission Status: IP  Cardiac monitoring: None     Hospital Problems  Date Reviewed: 2017            Codes Class Noted POA    Acute encephalopathy ICD-10-CM: G93.40  ICD-9-CM: 348.30  2023 Unknown         Social Determinants of Health     Tobacco Use: Medium Risk    Smoking Tobacco Use: Former    Smokeless Tobacco Use: Unknown    Passive Exposure: Not on file   Alcohol Use: Not on file   Financial Resource Strain: Not on file   Food Insecurity: Not on file   Transportation Needs: Not on file Physical Activity: Not on file   Stress: Not on file   Social Connections: Not on file   Intimate Partner Violence: Not on file   Depression: Not on file   Housing Stability: Not on file       Review of Systems:   Review of systems not obtained due to patient factors. Vital Signs:    Last 24hrs VS reviewed since prior progress note. Most recent are:  Visit Vitals  /68 (BP 1 Location: Right upper arm, BP Patient Position: At rest)   Pulse 64   Temp 97.8 °F (36.6 °C)   Resp 18   Ht 6' (1.829 m)   Wt 78.3 kg (172 lb 9.6 oz)   SpO2 96%   BMI 23.41 kg/m²         Intake/Output Summary (Last 24 hours) at 2/28/2023 0849  Last data filed at 2/27/2023 2129  Gross per 24 hour   Intake 240 ml   Output --   Net 240 ml          Physical Examination:     I had a face to face encounter with this patient and independently examined them on 2/28/2023 as outlined below:    Constitutional:  No acute distress, cooperative, pleasant    ENT:  Oral mucosa moist.    Resp:  CTA bilaterally. No wheezing/rhonchi/rales. No accessory muscle use. CV:  Regular rhythm, normal rate, S1,S2.    GI/:  Soft, non distended, non tender, no guarding, BS present. Voids Freely. Musculoskeletal:  No edema, warm. Neurologic:  Moves all extremities. Awake and alert, oriented to person. Skin:  w/d. Psych:  Not anxious nor agitated. Data Review:    I personally reviewed  Image      I have personally and independently reviewed all pertinent labs, diagnostic studies, imaging, and have provided independent interpretation of the same. Labs:     No results for input(s): WBC, HGB, HCT, PLT, HGBEXT, HCTEXT, PLTEXT, HGBEXT, HCTEXT, PLTEXT in the last 72 hours. No results for input(s): NA, K, CL, CO2, BUN, CREA, GLU, CA, MG, PHOS, URICA in the last 72 hours. No results for input(s): ALT, AP, TBIL, TBILI, TP, ALB, GLOB, GGT, AML, LPSE in the last 72 hours.     No lab exists for component: SGOT, GPT, AMYP, HLPSE    No results for input(s): INR, PTP, APTT, INREXT, INREXT in the last 72 hours. No results for input(s): FE, TIBC, PSAT, FERR in the last 72 hours. No results found for: FOL, RBCF   No results for input(s): PH, PCO2, PO2 in the last 72 hours. No results for input(s): CPK, CKNDX, TROIQ in the last 72 hours. No lab exists for component: CPKMB  No results found for: CHOL, CHOLX, CHLST, CHOLV, HDL, HDLP, LDL, LDLC, DLDLP, TGLX, TRIGL, TRIGP, CHHD, CHHDX  Lab Results   Component Value Date/Time    Glucose (POC) 109 04/26/2022 11:02 AM    Glucose (POC) 93 04/25/2022 07:22 AM    Glucose  (A) 10/09/2013 10:42 AM    Glucose  (A) 06/04/2013 10:16 AM    Glucose  (A) 09/26/2012 09:05 AM     Lab Results   Component Value Date/Time    Color YELLOW/STRAW 02/22/2023 08:43 AM    Appearance CLEAR 02/22/2023 08:43 AM    Specific gravity 1.014 02/22/2023 08:43 AM    pH (UA) 7.0 02/22/2023 08:43 AM    Protein Negative 02/22/2023 08:43 AM    Glucose Negative 02/22/2023 08:43 AM    Ketone Negative 02/22/2023 08:43 AM    Bilirubin Negative 02/22/2023 08:43 AM    Urobilinogen 0.2 02/22/2023 08:43 AM    Nitrites Negative 02/22/2023 08:43 AM    Leukocyte Esterase MODERATE (A) 02/22/2023 08:43 AM    Epithelial cells FEW 02/22/2023 08:43 AM    Bacteria Negative 02/22/2023 08:43 AM    WBC  02/22/2023 08:43 AM    RBC 0-5 02/22/2023 08:43 AM       Notes reviewed from all clinical/nonclinical/nursing services involved in patient's clinical care. Care coordination discussions were held with appropriate clinical/nonclinical/ nursing providers based on care coordination needs. Patients current active Medications were reviewed, considered, added and adjusted based on the clinical condition today. Home Medications were reconciled to the best of my ability given all available resources at the time of admission. Route is PO if not otherwise noted.       Admission Status:[id number]:::1}      Medications Reviewed:     Current Facility-Administered Medications   Medication Dose Route Frequency    sertraline (ZOLOFT) tablet 25 mg  25 mg Oral DAILY    traZODone (DESYREL) tablet 25 mg  25 mg Oral BID    traZODone (DESYREL) tablet 25 mg  25 mg Oral Q6H PRN    melatonin tablet 3 mg  3 mg Oral QHS    traZODone (DESYREL) tablet 100 mg  100 mg Oral QHS    donepeziL (ARICEPT) tablet 10 mg  10 mg Oral PCD    QUEtiapine (SEROquel) tablet 50 mg  50 mg Oral QHS    haloperidol lactate (HALDOL) injection 4 mg  4 mg IntraMUSCular Q8H PRN    sodium chloride (NS) flush 5-40 mL  5-40 mL IntraVENous Q8H    sodium chloride (NS) flush 5-40 mL  5-40 mL IntraVENous PRN    acetaminophen (TYLENOL) tablet 650 mg  650 mg Oral Q6H PRN    Or    acetaminophen (TYLENOL) suppository 650 mg  650 mg Rectal Q6H PRN    polyethylene glycol (MIRALAX) packet 17 g  17 g Oral DAILY PRN    ondansetron (ZOFRAN ODT) tablet 4 mg  4 mg Oral Q8H PRN    Or    ondansetron (ZOFRAN) injection 4 mg  4 mg IntraVENous Q6H PRN    enoxaparin (LOVENOX) injection 40 mg  40 mg SubCUTAneous DAILY     ______________________________________________________________________  EXPECTED LENGTH OF STAY: 2d 19h  ACTUAL LENGTH OF STAY:          7                 Sarah Dumont NP

## 2023-02-28 NOTE — PROGRESS NOTES
Transition of Care: Patient resides at the [facility] in [facility]. Family would like patient to return to the [facility] with hospice services. [facility] hospice is following. Noted patient currently has sitter. Facility is reviewing clinicals to determine if patient is appropriate to return to the [facility]. Noted family is exploring options for medical group homes (son has been in contact with [facility]) as back-up plan if [facility] is unable to accept patient back. Sons are Jayne Rogers, [phone] and Kaylen Villasenor #[phone]    NETO spoke with Eric Church at the [facility]. They did not receive clinicals that were faxed yesterday. CM re-faxed to #[phone].     3:40 PM: CM called Eric Church at the [address] [place] [address] to confirm they received clinicals.      LEXIE Figueroa/CRM

## 2023-02-28 NOTE — PROGRESS NOTES
Problem: Falls - Risk of  Goal: *Absence of Falls  Description: Document Yarelis Nino Fall Risk and appropriate interventions in the flowsheet.   Outcome: Progressing Towards Goal  Note: Fall Risk Interventions:  Mobility Interventions: Bed/chair exit alarm    Mentation Interventions: Bed/chair exit alarm    Medication Interventions: Bed/chair exit alarm    Elimination Interventions: Bed/chair exit alarm              Problem: Hypertension  Goal: *Blood pressure within specified parameters  Outcome: Progressing Towards Goal

## 2023-03-01 NOTE — PROGRESS NOTES
Bedside and Verbal shift change report given to Leeann (oncoming nurse) by Alm Schilder (offgoing nurse). Report included the following information SBAR and MAR.

## 2023-03-01 NOTE — PROGRESS NOTES
Transition of Care: Patient resides at the [facility] in [facility]. Family would like patient to return to the [facility] with hospice services. [facility] is following, the [facility] will need to megan olivares a one time contract with [facility]. The facility contracts with [facility] and [facility]. Noted patient currently has sitter. Facility is reviewing clinicals to determine if patient is appropriate to return to the [facility], and they plan to come do a bedside eval today. Noted family is exploring options for medical group homes (son has been in contact with [facility]) as back-up plan if [facility] is unable to accept patient back. Sons are Marie Conte, [phone] and [employer] #[phone]    CM spoke with Darlin Burnette at the [facility]. They are coming to do a bedside eval today.     Geovani Contreras, BSW/CRM

## 2023-03-01 NOTE — PROGRESS NOTES
[facility] Adult  Hospitalist Group                                                                                          Hospitalist Progress Note  Ariela Isrealsoraida [place]  Answering service: [phone] from in house phone        Date of Service:  3/1/2023  NAME:  Ángel Foster  :  1933  MRN:  [id number]      Admission Summary:   Ángel Foster is a 80 y.o. male with a PMH of BPH, Melanoma, HTN, GERD, HLD, and AFIB who presented from  [address] with AMS and aggressive behavior x3 days and was admitted for acute encephalopathy 2/2 to Acute Cystitis. History given by patients son who is at bedside, reported baseline patient is ambulatory independently, but has confused speech, is calm but curses and is aggressive when he has a UTI. Interval history / Subjective:   Saw the patient on rounds today. He is AAOx2, does not endorse any specific complaints at this time. Awaiting placement at facility with hospice     Assessment & Plan:     Acute Encephalopathy 2/2 to UTI/ Chronic Dementia: intermittent breakthrough agitation. - CT Head wo :no acute process. - IV ABT ceftriaxone completed      Dementia with behavioral disturbance: Comfort Measures  - continue aricept, trazodone   - Psych saw the patient, appreciate recs  - Continue seroquel, zoloft, melatonin  - sitter at bedside   - CM following for outpatient hospice placement     Code status: DNR  Prophylaxis: Lovenox  Care Plan discussed with: RN, NETO, Attending  Anticipated Disposition: LTC with hospice pending facility acceptance     Hospital Problems  Date Reviewed: 2017            Codes Class Noted POA    Acute encephalopathy ICD-10-CM: G93.40  ICD-9-CM: 348.30  2023 Unknown             Review of Systems:   A comprehensive review of systems was negative except for that written in the HPI. Vital Signs:    Last 24hrs VS reviewed since prior progress note.  Most recent are:  Visit Vitals  /76 (BP 1 Location: Right upper arm, BP Patient Position: At rest)   Pulse 65   Temp 97 °F (36.1 °C)   Resp 16   Ht 6' (1.829 m)   Wt 78.3 kg (172 lb 9.6 oz)   SpO2 95%   BMI 23.41 kg/m²         Intake/Output Summary (Last 24 hours) at 3/1/2023 1043  Last data filed at 2/28/2023 2231  Gross per 24 hour   Intake 480 ml   Output --   Net 480 ml        Physical Examination:     I had a face to face encounter with this patient and independently examined them on 3/1/2023 as outlined below:          Constitutional:  No acute distress, cooperative, pleasant    ENT:  Oral mucosa moist, oropharynx benign. Resp:  CTA bilaterally. No wheezing/rhonchi/rales. No accessory muscle use. CV:  Regular rhythm, normal rate, no murmurs, gallops, rubs    GI:  Soft, non distended, non tender. normoactive bowel sounds    Musculoskeletal:  No edema, warm, 2+ pulses throughout    Neurologic:  Moves all extremities. Oriented to self, dementia at baseline            Data Review:   Review and/or order of clinical lab test  Review and/or order of tests in the radiology section of CPT  Review and/or order of tests in the medicine section of CPT    Labs:   No results for input(s): WBC, HGB, HCT, PLT, HGBEXT, HCTEXT, PLTEXT in the last 72 hours. No results for input(s): NA, K, CL, CO2, BUN, CREA, GLU, CA, MG, PHOS, URICA in the last 72 hours. No results for input(s): ALT, AP, TBIL, TBILI, TP, ALB, GLOB, GGT, AML, LPSE in the last 72 hours. No lab exists for component: SGOT, GPT, AMYP, HLPSE  No results for input(s): INR, PTP, APTT, INREXT in the last 72 hours. No results for input(s): FE, TIBC, PSAT, FERR in the last 72 hours. No results found for: FOL, RBCF   No results for input(s): PH, PCO2, PO2 in the last 72 hours. No results for input(s): CPK, CKNDX, TROIQ in the last 72 hours.     No lab exists for component: CPKMB  No results found for: CHOL, CHOLX, CHLST, CHOLV, HDL, HDLP, LDL, LDLC, DLDLP, TGLX, TRIGL, TRIGP, [facility], [facility]  Lab Results   Component Value Date/Time    Glucose (POC) 109 04/26/2022 11:02 AM    Glucose (POC) 93 04/25/2022 07:22 AM    Glucose  (A) 10/09/2013 10:42 AM    Glucose  (A) 06/04/2013 10:16 AM    Glucose  (A) 09/26/2012 09:05 AM     Lab Results   Component Value Date/Time    Color YELLOW/STRAW 02/22/2023 08:43 AM    Appearance CLEAR 02/22/2023 08:43 AM    Specific gravity 1.014 02/22/2023 08:43 AM    pH (UA) 7.0 02/22/2023 08:43 AM    Protein Negative 02/22/2023 08:43 AM    Glucose Negative 02/22/2023 08:43 AM    Ketone Negative 02/22/2023 08:43 AM    Bilirubin Negative 02/22/2023 08:43 AM    Urobilinogen 0.2 02/22/2023 08:43 AM    Nitrites Negative 02/22/2023 08:43 AM    Leukocyte Esterase MODERATE (A) 02/22/2023 08:43 AM    Epithelial cells FEW 02/22/2023 08:43 AM    Bacteria Negative 02/22/2023 08:43 AM    WBC  02/22/2023 08:43 AM    RBC 0-5 02/22/2023 08:43 AM         Medications Reviewed:     Current Facility-Administered Medications   Medication Dose Route Frequency    sertraline (ZOLOFT) tablet 25 mg  25 mg Oral DAILY    traZODone (DESYREL) tablet 25 mg  25 mg Oral BID    traZODone (DESYREL) tablet 25 mg  25 mg Oral Q6H PRN    melatonin tablet 3 mg  3 mg Oral QHS    traZODone (DESYREL) tablet 100 mg  100 mg Oral QHS    donepeziL (ARICEPT) tablet 10 mg  10 mg Oral PCD    QUEtiapine (SEROquel) tablet 50 mg  50 mg Oral QHS    haloperidol lactate (HALDOL) injection 4 mg  4 mg IntraMUSCular Q8H PRN    sodium chloride (NS) flush 5-40 mL  5-40 mL IntraVENous Q8H    sodium chloride (NS) flush 5-40 mL  5-40 mL IntraVENous PRN    acetaminophen (TYLENOL) tablet 650 mg  650 mg Oral Q6H PRN    Or    acetaminophen (TYLENOL) suppository 650 mg  650 mg Rectal Q6H PRN    polyethylene glycol (MIRALAX) packet 17 g  17 g Oral DAILY PRN    ondansetron (ZOFRAN ODT) tablet 4 mg  4 mg Oral Q8H PRN    Or    ondansetron (ZOFRAN) injection 4 mg  4 mg IntraVENous Q6H PRN    enoxaparin (LOVENOX) injection 40 mg  40 mg SubCUTAneous DAILY     ______________________________________________________________________  EXPECTED LENGTH OF STAY: 2d 19h  ACTUAL LENGTH OF STAY:          8                 Cheryn Hazel Milligram, PA-C
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
## 2023-03-02 NOTE — HOSPICE
[facility]  [phone]    Hospice Liaison Nursing Note   Patient Name: Shannan Trejo  YOB: 1933  Age: 80 y.o. Chart reviewed for updates in plan of care. Plan: Hospice team continues to be available for hospice support/education as discharge plans are being arranged. Please call Hospice team to offer support for patient, family or staff. Thank you for your coordination with the hospice plan of care. 13:00 Bedside assessment. Sitter bedside. Patient is in restraints, appears to be sleeping. Hospice team will continue to monitor as behavioral team making medication changes for patient's behavioral issues.       Jaret Rodriguez RN, [place] [address] Nurse Liaison  [phone] [place]  [phone] Office

## 2023-03-02 NOTE — PROGRESS NOTES
Problem: Falls - Risk of  Goal: *Absence of Falls  Description: Document Greg Dickens Fall Risk and appropriate interventions in the flowsheet.   Outcome: Progressing Towards Goal  Note: Fall Risk Interventions:  Mobility Interventions: Bed/chair exit alarm, Utilize walker, cane, or other assistive device, Utilize gait belt for transfers/ambulation, Patient to call before getting OOB, PT Consult for mobility concerns    Mentation Interventions: Bed/chair exit alarm, Door open when patient unattended, Family/sitter at bedside, Familiar objects from home, More frequent rounding, Reorient patient, Increase mobility    Medication Interventions: Bed/chair exit alarm, Evaluate medications/consider consulting pharmacy, Teach patient to arise slowly    Elimination Interventions: Patient to call for help with toileting needs, Toilet paper/wipes in reach, Toileting schedule/hourly rounds              Problem: Patient Education: Go to Patient Education Activity  Goal: Patient/Family Education  Outcome: Progressing Towards Goal     Problem: Hypertension  Goal: *Blood pressure within specified parameters  Outcome: Progressing Towards Goal  Goal: *Fluid volume balance  Outcome: Progressing Towards Goal

## 2023-03-02 NOTE — PROGRESS NOTES
Bedside shift change report given to Jose Verdin RN (oncoming nurse) by Derik Gonzalez RN (offgoing nurse). Report included the following information SBAR, Kardex, Intake/Output, and MAR.

## 2023-03-02 NOTE — PROGRESS NOTES
Transition of Care: Patient resides at the [facility] in [facility]. Family would like patient to return to the [facility] with hospice services. [facility] is following, the [facility] will need to make one time contract with [facility]. The facility also contracts with [facility] and [facility]. Noted patient currently has sitter. Facility is reviewing clinicals to determine if patient is appropriate to return to the [facility]. Noted family is exploring options for medical group homes (son has been in contact with [facility]) as back-up plan if [facility] is unable to accept patient back. Sons are Terrence Cruz, [phone] and Stacey Manchester #[phone]    CM received call from La at the [facility] [phone]. They are requesting a PT eval as patient was ambulatory prior to admission. CM notified attending and order for PT has been placed. CM called Lauren, will await call back to discuss if they can connect with the DON at the [facility] to discuss how patient can be best managed at the [facility].      Namita Johnson, BSW/CRM

## 2023-03-02 NOTE — ROUTINE PROCESS
Bedside shift change report given to Marie PatricioRN (oncoming nurse) by Phoebe Hernández RN(offgoing nurse). Report given with SBAR.

## 2023-03-02 NOTE — BSMART NOTE
BSMART Liaison Team Note     LOS:  9     Patient goal(s) for today: unable to verbalize  [facility] Liaison team focus/goals: assess MH needs, provide education and support. Progress note: Liaison attempted to meet with Pt. 1:1 sitter at bedside. Pt was sleeping and unable to be woken up. Per sitter he was recently given PRN Seroquel for agitation. Pt has been restless today and was agitated and aggressive yesterday. He is not appropriate for [facility] admission and does not benefit from psychotherapy visits at this time. Barriers to Discharge: medical clearance  Guns in the home: no      Outpatient provider(s):  NA  Insurance info/prescription coverage:  VA MEDICARE/VA MEDICARE PART A & B; AARP/BSHSI AARP SUPPLEMENT MEDICARE     Diagnosis: acute encephalopathy, dementia      Plan:  will return to [facility] with hospice care.    Follow up Psych Consult placed? no  Psychiatrist updated? no       Participating treatment team members: Keven Arriaga, Joon Rios, MSW

## 2023-03-03 NOTE — PROGRESS NOTES
Problem: Falls - Risk of  Goal: *Absence of Falls  Description: Document Ulysses Sheldon Fall Risk and appropriate interventions in the flowsheet. Outcome: Progressing Towards Goal  Note: Fall Risk Interventions:  Mobility Interventions: Bed/chair exit alarm, Utilize walker, cane, or other assistive device, Utilize gait belt for transfers/ambulation, Patient to call before getting OOB, PT Consult for mobility concerns    Mentation Interventions: Bed/chair exit alarm, Door open when patient unattended, Family/sitter at bedside, Familiar objects from home, More frequent rounding, Reorient patient, Increase mobility    Medication Interventions: Bed/chair exit alarm, Evaluate medications/consider consulting pharmacy, Teach patient to arise slowly    Elimination Interventions: Patient to call for help with toileting needs, Toilet paper/wipes in reach, Toileting schedule/hourly rounds              Problem: Hypertension  Goal: *Blood pressure within specified parameters  Outcome: Progressing Towards Goal     Problem: Pressure Injury - Risk of  Goal: *Prevention of pressure injury  Description: Document Andrew Scale and appropriate interventions in the flowsheet.   Outcome: Progressing Towards Goal  Note: Pressure Injury Interventions:  Sensory Interventions: Assess changes in LOC    Moisture Interventions: Absorbent underpads, Check for incontinence Q2 hours and as needed    Activity Interventions: PT/OT evaluation    Mobility Interventions: PT/OT evaluation    Nutrition Interventions: Offer support with meals,snacks and hydration    Friction and Shear Interventions: Lift sheet

## 2023-03-03 NOTE — PROGRESS NOTES
Transition of Care: Pending acceptance to return to the [facility] memory care with [facility]. The [facility] will need to make one time contract with [facility]. Noted patient currently has sitter. The facility requires patient be out of restraints for 24 hours prior to discharge. They do not require patient be sitter free. Facility is requesting PT eval and then will determine if patient is appropriate to return. The facility does not accept weekend admissions. Noted family is exploring options for medical group homes (son has been in contact with [facility]) as back-up plan if [facility] is unable to accept patient back. Sons are Sanjuanita Peacock, [phone] and Boaz Lagosjuve #[phone]     CM received call from [address] [place] [address] at the [facility] [phone], the patient must be off restraints for 24 hours prior to discharge, and they also are asking to review PT eval and then will determine whether they can accept patient. CM spoke with Kavon Villarreal RN, patient has been out of restraints since 12 PM.    CM spoke with [address] [place] [address] at the [facility]. They do not take weekend admissions, CM will follow-up on Monday. CM will send PT note once available. Care management will follow.      Jerry Quintero, BSW/CRM

## 2023-03-03 NOTE — PROGRESS NOTES
Problem: Mobility Impaired (Adult and Pediatric)  Goal: *Acute Goals and Plan of Care (Insert Text)  Description: FUNCTIONAL STATUS PRIOR TO ADMISSION: Patient was supervision for basic and instrumental ADLs, primarily due to dementia. Ambulated without AD. Jem Hernandez HOME SUPPORT PRIOR TO ADMISSION: The patient lived At [facility] and required supervision/set-up for ADLs and mobility. .    Physical Therapy Goals  Initiated 3/3/2023  1. Patient will move from supine to sit and sit to supine , scoot up and down, and roll side to side in bed with contact guard assist within 7 day(s). 2.  Patient will transfer from bed to chair and chair to bed with contact guard assist using the least restrictive device within 7 day(s). 3.  Patient will perform sit to stand with contact guard assist within 7 day(s). 4.  Patient will ambulate with minimal assistance/contact guard assist for 50 feet with the least restrictive device within 7 day(s). Outcome: Progressing Towards Goal   PHYSICAL THERAPY EVALUATION  Patient: Shannan Trejo (47 y.o. male)  Date: 3/3/2023  Primary Diagnosis: Acute encephalopathy [G93.40]       Precautions:   Fall    ASSESSMENT  Based on the objective data described below, the patient presents with  impairment in functional mobility, activity tolerance and balance s/p hospitalization for acute encephalopathy. Patient is a resident at [facility], [facility]. Per family, he was ambulatory with no AD. Patient presents asleep in bed, wakens easily. He is agreeable to participate in therapy and was cooperative. He performed bed mobility, transfers and ambulated in hallway 40 ft with bilateral handhold of 2 P.T.s. Became very fatigued during last 15 ft of the walk. Sat on edge of bed to rest, then stood for hygiene and diaper change, sidestepped to head of bed +2 assistance.     Current Level of Function Impacting Discharge (mobility/balance): Minimal-moderate assistance of 2, additional time, use of bed rail for bed mobility/Moderate assistance of 2 for transfers and gait. Walked 40 ft with bilateral handhold of PT and PT Tech, knees flexed, forward flexed posture, shuffling gait. May want to try RW next session. Functional Outcome Measure: The patient scored 25/100 on the Barthel outcome measure which is indicative of maximal impaired ability to care for basic self-needs/dependency on others. Other factors to consider for discharge: Resides in Memory Unit of [facility]/Far from OF     Patient will benefit from skilled therapy intervention to address the above noted impairments. PLAN :  Recommendations and Planned Interventions: bed mobility training, transfer training, gait training, patient and family training/education, and therapeutic activities      Frequency/Duration: Patient will be followed by physical therapy:  3 times a week to address goals. Recommendation for discharge: (in order for the patient to meet his/her long term goals)  Therapy up to 5 days/week in SNF setting    This discharge recommendation:  Has been made in collaboration with the attending provider and/or case management    IF patient discharges home will need the following DME: rolling walker (perhaps; will continue to assess)         SUBJECTIVE:   Patient stated Ana Barnes, I'll get up.     OBJECTIVE DATA SUMMARY:   HISTORY:    Past Medical History:   Diagnosis Date    Bleeding duodenal ulcer     BPH (benign prostatic hyperplasia) 10/9/2013    Cancer ([facility] [address]     melanoma    Carpal tunnel syndrome     DJD (degenerative joint disease)     GERD (gastroesophageal reflux disease) 1/25/2012    Glucose intolerance (impaired glucose tolerance) 7/20/2011    HTN (hypertension) 7/20/2011    Hyperlipidemia 7/20/2011    Lumbar spinal stenosis     Mastoiditis     Memory changes 8/20/2015    PAF (paroxysmal atrial fibrillation) ([facility] [address] 7/20/2011    RBBB (right bundle branch block)     Zoster      Past Surgical History:   Procedure Laterality Date    HX APPENDECTOMY      HX OTHER SURGICAL      lung biopsy     HX OTHER SURGICAL      melanoma excision    HX TONSIL AND ADENOIDECTOMY         Personal factors and/or comorbidities impacting plan of care: Resides in Memory Unit of [facility]/Far from [address] Name: [address] [place] [address] Ave  # Steps to Enter: 0  One/Two Story Residence: One story  Living Alone: No  Support Systems: Assisted Living  Patient Expects to be Discharged to[de-identified] Assisted Living ([facility])  Current DME Used/Available at Home: None    EXAMINATION/PRESENTATION/DECISION MAKING:   Critical Behavior:  Neurologic State: Confused  Orientation Level: Oriented to person, Disoriented to place, Disoriented to situation, Disoriented to time  Cognition: Decreased command following, Impaired decision making     Hearing: Auditory  Auditory Impairment: None    Range Of Motion:  AROM: Generally decreased, functional           PROM: Generally decreased, functional           Strength:    Strength: Generally decreased, functional                    Tone & Sensation:   Tone: Normal              Sensation: Intact               Coordination:  Coordination: Generally decreased, functional  Vision:      Functional Mobility:  Bed Mobility:     Supine to Sit: Minimum assistance;Bed Modified; Adaptive equipment; Additional time; Moderate assistance;Assist x2 (use of rail)  Sit to Supine: Minimum assistance; Moderate assistance;Assist x2  Scooting: Moderate assistance;Assist x2  Transfers:  Sit to Stand: Moderate assistance;Assist x2  Stand to Sit: Moderate assistance;Assist x2                       Balance:   Sitting: Intact  Standing: Impaired; With support  Standing - Static: Fair;Constant support  Standing - Dynamic : Poor;Constant support  Ambulation/Gait Training:  Distance (ft): 40 Feet (ft)     Ambulation - Level of Assistance:  Moderate assistance;Assist x2 (bilateral hand hold (2 clinicians))        Gait Abnormalities: Antalgic;Decreased step clearance;Shuffling gait;Trunk sway increased        Base of Support: Narrowed; Center of gravity altered     Speed/Marjorie: Slow;Shuffled            Functional Measure:  Barthel Index:    Bathin  Bladder: 5  Bowels: 5  Groomin  Dressin  Feedin  Mobility: 0  Stairs: 0  Toilet Use: 0  Transfer (Bed to Chair and Back): 10  Total: 25/100       The Barthel ADL Index: Guidelines  1. The index should be used as a record of what a patient does, not as a record of what a patient could do. 2. The main aim is to establish degree of independence from any help, physical or verbal, however minor and for whatever reason. 3. The need for supervision renders the patient not independent. 4. A patient's performance should be established using the best available evidence. Asking the patient, friends/relatives and nurses are the usual sources, but direct observation and common sense are also important. However direct testing is not needed. 5. Usually the patient's performance over the preceding 24-48 hours is important, but occasionally longer periods will be relevant. 6. Middle categories imply that the patient supplies over 50 per cent of the effort. 7. Use of aids to be independent is allowed. Score Interpretation (from [address] [facility] [address])    Independent   60-79 Minimally independent   40-59 Partially dependent   20-39 Very dependent   <20 Totally dependent     -Seb Spicer., Barthel, D.W. (1965). Functional evaluation: the Barthel Index. [address] [facility] ([address] (). The Barthel activities of daily living index: self-reporting versus actual performance in the old (> or = 75 years). Journal of [address] [place] [address] 457), [address] [facility], HERMES, Amy Ferrer., Holly Salgado. (1999).  Measuring the change in disability after inpatient rehabilitation; comparison of the responsiveness of the Barthel Index and Functional Gonzales Measure. Journal of Neurology, Neurosurgery, and Psychiatry, 66(4), 273-814. KELVIN Sherwood, ELIF Mccrary, & Franco Carrillo M.A. (2004) Assessment of post-stroke quality of life in cost-effectiveness studies: The usefulness of the Barthel Index and the EuroQoL-5D. Quality of Life Research, 15, 848-19           Physical Therapy Evaluation Charge Determination   History Examination Presentation Decision-Making   MEDIUM  Complexity : 1-2 comorbidities / personal factors will impact the outcome/ POC  MEDIUM Complexity : 3 Standardized tests and measures addressing body structure, function, activity limitation and / or participation in recreation  LOW Complexity : Stable, uncomplicated  HIGH Complexity : FOTO score of 1- 25       Based on the above components, the patient evaluation is determined to be of the following complexity level: LOW     Pain Rating:  None reported or observed. Activity Tolerance:   Fair-fatigued and had difficulty standing upright at end of walk. After treatment patient left in no apparent distress:   Supine in bed, Call bell within reach, Bed / chair alarm activated, Caregiver / family present, Side rails x 3, and nurse notified. COMMUNICATION/EDUCATION:   The patients plan of care was discussed with: Registered nurse and Case management. Fall prevention education was provided and the patient/caregiver indicated understanding., Patient/family have participated as able in goal setting and plan of care. , and Patient/family agree to work toward stated goals and plan of care.     Thank you for this referral.  Render Val   Time Calculation: 30 mins

## 2023-03-03 NOTE — HOSPICE
[facility]. Per hospice liaison [facility] FERNANDEZ RN, pt is appropriate for routine level of care. Family would like pt to return to [facility] where pt resided prior to admission. [facility] will need a one time contract with the [facility]. [facility]       Michelet Darnell, [facility]    [phone]

## 2023-03-03 NOTE — PROGRESS NOTES
Bedside shift change report given to Guevara Jansen RN (oncoming nurse) by Brnadon Humphreys RN (offgoing nurse). Report included the following information SBAR, Kardex, Intake/Output, and MAR.

## 2023-03-03 NOTE — PROGRESS NOTES
[facility] [facility] Adult  Hospitalist Group                                                                                          Hospitalist Progress Note  Tremayne Allen  Answering service: [phone] from in house phone        Date of Service:  3/3/2023  NAME:  Tali Pat  :  1933  MRN:  [id number]      Admission Summary:   Tali Pat is a 80 y.o. male with a PMH of BPH, Melanoma, HTN, GERD, HLD, and AFIB who presented from  [address] with AMS and aggressive behavior x3 days and was admitted for acute encephalopathy 2/2 to Acute Cystitis. History given by patients son who is at bedside, reported baseline patient is ambulatory independently, but has confused speech, is calm but curses and is aggressive when he has a UTI. Interval history / Subjective:   Pt was sleeping again during rounds this morning. Per sitter pt has not been agitated this morning. Will again trial removing restraints     Assessment & Plan:     Dementia with behavioral disturbance: Comfort Measures  - Hospice is following and the plan is ultimately for discharge to facility with outpatient hospice. Treatment plan will reflect alleviation of agitation ISO dementia  - Psych saw the patient, appreciate recs  - Continue zoloft, melatonin, aricept, trazodone   - Continue BID Seroquel  - PRN haldol for refractory agitation  - In soft restraints, will discontinue   - sitter at bedside   - CM following for outpatient hospice placement    Acute Encephalopathy 2/2 to UTI/ Chronic Dementia:  - UCX had no growth  - CT Head wo :no acute process.   - ABX: ceftriaxone completed   - symptoms now more likely related to chronic dementia     Code status: DNR  Prophylaxis: Lovenox  Care Plan discussed with: RN, NETO, Attending  Anticipated Disposition: LTC with hospice pending facility acceptance     Hospital Problems  Date Reviewed: 2017            Codes Class Noted POA    Acute encephalopathy ICD-10-CM: G93.40  ICD-9-CM: 348.30  2/21/2023 Unknown         Review of Systems:   A comprehensive review of systems was negative except for that written in the HPI. Vital Signs:    Last 24hrs VS reviewed since prior progress note. Most recent are:  Visit Vitals  /66 (BP 1 Location: Left upper arm, BP Patient Position: At rest)   Pulse 100   Temp 99.7 °F (37.6 °C)   Resp 16   Ht 6' (1.829 m)   Wt 78.3 kg (172 lb 9.6 oz)   SpO2 97%   BMI 23.41 kg/m²         Intake/Output Summary (Last 24 hours) at 3/3/2023 1257  Last data filed at 3/2/2023 2030  Gross per 24 hour   Intake 210 ml   Output --   Net 210 ml          Physical Examination:     I had a face to face encounter with this patient and independently examined them on 3/3/2023 as outlined below:          Constitutional:  No acute distress   ENT:  Oral mucosa moist, oropharynx benign. Resp:  CTA bilaterally. No wheezing/rhonchi/rales. No accessory muscle use. CV:  Regular rhythm, normal rate, no murmurs, gallops, rubs    GI:  Soft, non distended, non tender. normoactive bowel sounds    Musculoskeletal:  No edema, warm, 2+ pulses throughout    Neurologic:  Moves all extremities. Sleeping, did not participate in exam            Data Review:   Review and/or order of clinical lab test  Review and/or order of tests in the radiology section of CPT  Review and/or order of tests in the medicine section of CPT    Labs:   No results for input(s): WBC, HGB, HCT, PLT, HGBEXT, HCTEXT, PLTEXT, HGBEXT, HCTEXT, PLTEXT in the last 72 hours. No results for input(s): NA, K, CL, CO2, BUN, CREA, GLU, CA, MG, PHOS, URICA in the last 72 hours. No results for input(s): ALT, AP, TBIL, TBILI, TP, ALB, GLOB, GGT, AML, LPSE in the last 72 hours. No lab exists for component: SGOT, GPT, AMYP, HLPSE  No results for input(s): INR, PTP, APTT, INREXT, INREXT in the last 72 hours. No results for input(s): FE, TIBC, PSAT, FERR in the last 72 hours.    No results found for: FOL, RBCF   No results for input(s): PH, PCO2, PO2 in the last 72 hours. No results for input(s): CPK, CKNDX, TROIQ in the last 72 hours.     No lab exists for component: CPKMB  No results found for: CHOL, CHOLX, CHLST, CHOLV, HDL, HDLP, LDL, LDLC, DLDLP, TGLX, TRIGL, TRIGP, CHHD, CHHDX  Lab Results   Component Value Date/Time    Glucose (POC) 109 04/26/2022 11:02 AM    Glucose (POC) 93 04/25/2022 07:22 AM    Glucose  (A) 10/09/2013 10:42 AM    Glucose  (A) 06/04/2013 10:16 AM    Glucose  (A) 09/26/2012 09:05 AM     Lab Results   Component Value Date/Time    Color YELLOW/STRAW 02/22/2023 08:43 AM    Appearance CLEAR 02/22/2023 08:43 AM    Specific gravity 1.014 02/22/2023 08:43 AM    pH (UA) 7.0 02/22/2023 08:43 AM    Protein Negative 02/22/2023 08:43 AM    Glucose Negative 02/22/2023 08:43 AM    Ketone Negative 02/22/2023 08:43 AM    Bilirubin Negative 02/22/2023 08:43 AM    Urobilinogen 0.2 02/22/2023 08:43 AM    Nitrites Negative 02/22/2023 08:43 AM    Leukocyte Esterase MODERATE (A) 02/22/2023 08:43 AM    Epithelial cells FEW 02/22/2023 08:43 AM    Bacteria Negative 02/22/2023 08:43 AM    WBC  02/22/2023 08:43 AM    RBC 0-5 02/22/2023 08:43 AM         Medications Reviewed:     Current Facility-Administered Medications   Medication Dose Route Frequency    QUEtiapine (SEROquel) tablet 50 mg  50 mg Oral BID    sertraline (ZOLOFT) tablet 25 mg  25 mg Oral DAILY    traZODone (DESYREL) tablet 25 mg  25 mg Oral BID    traZODone (DESYREL) tablet 25 mg  25 mg Oral Q6H PRN    melatonin tablet 3 mg  3 mg Oral QHS    traZODone (DESYREL) tablet 100 mg  100 mg Oral QHS    donepeziL (ARICEPT) tablet 10 mg  10 mg Oral PCD    haloperidol lactate (HALDOL) injection 4 mg  4 mg IntraMUSCular Q8H PRN    sodium chloride (NS) flush 5-40 mL  5-40 mL IntraVENous Q8H    sodium chloride (NS) flush 5-40 mL  5-40 mL IntraVENous PRN    acetaminophen (TYLENOL) tablet 650 mg  650 mg Oral Q6H PRN    Or acetaminophen (TYLENOL) suppository 650 mg  650 mg Rectal Q6H PRN    polyethylene glycol (MIRALAX) packet 17 g  17 g Oral DAILY PRN    ondansetron (ZOFRAN ODT) tablet 4 mg  4 mg Oral Q8H PRN    Or    ondansetron (ZOFRAN) injection 4 mg  4 mg IntraVENous Q6H PRN    enoxaparin (LOVENOX) injection 40 mg  40 mg SubCUTAneous DAILY     ______________________________________________________________________  EXPECTED LENGTH OF STAY: 2d 19h  ACTUAL LENGTH OF STAY:          10                 Zoanne Silvers Milligram, PA-C

## 2023-03-03 NOTE — HOSPICE
[facility]  [phone]    Hospice Liaison Nursing Note   Patient Name: Watson Santana  YOB: 1933  Age: 80 y.o. Chart reviewed for updates in plan of care. Plan: Hospice team continues to be available for hospice support/education as discharge plans are being arranged. Please call Hospice team to offer support for patient, family or staff. Thank you for your coordination with the hospice plan of care.           Kellee Watson RN, [place] [address] Nurse Liaison  [phone] [place]  [phone] Office

## 2023-03-04 NOTE — PROGRESS NOTES
Bedside shift change report given to Nelly Chi RN (oncoming nurse) by Liana Sims RN (offgoing nurse). Report included the following information SBAR, Kardex, Intake/Output, and MAR.

## 2023-03-04 NOTE — PROGRESS NOTES
[facility] [facility] Adult  Hospitalist Group                                                                                          Hospitalist Progress Note  Lyndsay Mai [place]  Answering service: [phone] OR [phone] from in house phone        Date of Service:  3/4/2023  NAME:  Adan May  :  1933  MRN:  [id number]      Admission Summary:   Adan May is a 80 y.o. male with a PMH of BPH, Melanoma, HTN, GERD, HLD, and AFIB who presented from  [address] with AMS and aggressive behavior x3 days and was admitted for acute encephalopathy 2/2 to Acute Cystitis. History given by patients son who is at bedside, reported baseline patient is ambulatory independently, but has confused speech, is calm but curses and is aggressive when he has a UTI. Interval history / Subjective:   Pt is more awake today, he is AAOx2. RN reports pt has not shown much agitation this morning. Sitter remains at bedside. Pt endorses some neck pain, RN to give PRN tylenol     Assessment & Plan:     Dementia with behavioral disturbance: Comfort Measures  - Hospice is following and the plan is ultimately for discharge to facility with outpatient hospice. Treatment plan will reflect alleviation of agitation ISO dementia  - Psych saw the patient, appreciate recs  - Continue zoloft, melatonin, aricept, trazodone   - Continue BID Seroquel, consider titrating up if needed  - PRN haldol for refractory agitation  - Restraints off since (3/3) morning  - sitter at bedside   - CM following for outpatient hospice placement    Acute Encephalopathy 2/2 to UTI/ Chronic Dementia:  - UCX had no growth  - CT Head wo :no acute process.   - ABX: ceftriaxone completed   - symptoms now more likely related to chronic dementia     Code status: DNR  Prophylaxis: Lovenox  Care Plan discussed with: RN, CM, Attending  Anticipated Disposition: LTC with hospice pending facility acceptance     Hospital Problems  Date Reviewed: 1/31/2017            Codes Class Noted POA    Acute encephalopathy ICD-10-CM: G93.40  ICD-9-CM: 348.30  2/21/2023 Unknown         Review of Systems:   A comprehensive review of systems was negative except for that written in the HPI. Vital Signs:    Last 24hrs VS reviewed since prior progress note. Most recent are:  Visit Vitals  /64 (BP 1 Location: Left upper arm, BP Patient Position: Other (Comment))   Pulse 97   Temp 97 °F (36.1 °C)   Resp 18   Ht 6' (1.829 m)   Wt 78.3 kg (172 lb 9.6 oz)   SpO2 96%   BMI 23.41 kg/m²       No intake or output data in the 24 hours ending 03/04/23 0913       Physical Examination:     I had a face to face encounter with this patient and independently examined them on 3/4/2023 as outlined below:          Constitutional:  No acute distress, cooperative   ENT:  Oral mucosa moist, oropharynx benign. Resp:  CTA bilaterally. No wheezing/rhonchi/rales. No accessory muscle use. CV:  Regular rhythm, normal rate, no murmurs, gallops, rubs    GI:  Soft, non distended, non tender. normoactive bowel sounds    Musculoskeletal:  No edema, warm, 2+ pulses throughout    Neurologic:  Moves all extremities. Oriented to self and location            Data Review:   Review and/or order of clinical lab test  Review and/or order of tests in the radiology section of CPT  Review and/or order of tests in the medicine section of CPT    Labs:   No results for input(s): WBC, HGB, HCT, PLT, HGBEXT, HCTEXT, PLTEXT, HGBEXT, HCTEXT, PLTEXT in the last 72 hours. No results for input(s): NA, K, CL, CO2, BUN, CREA, GLU, CA, MG, PHOS, URICA in the last 72 hours. No results for input(s): ALT, AP, TBIL, TBILI, TP, ALB, GLOB, GGT, AML, LPSE in the last 72 hours. No lab exists for component: SGOT, GPT, AMYP, HLPSE  No results for input(s): INR, PTP, APTT, INREXT, INREXT in the last 72 hours. No results for input(s): FE, TIBC, PSAT, FERR in the last 72 hours.    No results found for: FOL, RBCF   No results for input(s): PH, PCO2, PO2 in the last 72 hours. No results for input(s): CPK, CKNDX, TROIQ in the last 72 hours.     No lab exists for component: CPKMB  No results found for: CHOL, CHOLX, CHLST, CHOLV, HDL, HDLP, LDL, LDLC, DLDLP, TGLX, TRIGL, TRIGP, CHHD, CHHDX  Lab Results   Component Value Date/Time    Glucose (POC) 109 04/26/2022 11:02 AM    Glucose (POC) 93 04/25/2022 07:22 AM    Glucose  (A) 10/09/2013 10:42 AM    Glucose  (A) 06/04/2013 10:16 AM    Glucose  (A) 09/26/2012 09:05 AM     Lab Results   Component Value Date/Time    Color YELLOW/STRAW 02/22/2023 08:43 AM    Appearance CLEAR 02/22/2023 08:43 AM    Specific gravity 1.014 02/22/2023 08:43 AM    pH (UA) 7.0 02/22/2023 08:43 AM    Protein Negative 02/22/2023 08:43 AM    Glucose Negative 02/22/2023 08:43 AM    Ketone Negative 02/22/2023 08:43 AM    Bilirubin Negative 02/22/2023 08:43 AM    Urobilinogen 0.2 02/22/2023 08:43 AM    Nitrites Negative 02/22/2023 08:43 AM    Leukocyte Esterase MODERATE (A) 02/22/2023 08:43 AM    Epithelial cells FEW 02/22/2023 08:43 AM    Bacteria Negative 02/22/2023 08:43 AM    WBC  02/22/2023 08:43 AM    RBC 0-5 02/22/2023 08:43 AM         Medications Reviewed:     Current Facility-Administered Medications   Medication Dose Route Frequency    QUEtiapine (SEROquel) tablet 50 mg  50 mg Oral BID    sertraline (ZOLOFT) tablet 25 mg  25 mg Oral DAILY    traZODone (DESYREL) tablet 25 mg  25 mg Oral BID    traZODone (DESYREL) tablet 25 mg  25 mg Oral Q6H PRN    melatonin tablet 3 mg  3 mg Oral QHS    traZODone (DESYREL) tablet 100 mg  100 mg Oral QHS    donepeziL (ARICEPT) tablet 10 mg  10 mg Oral PCD    haloperidol lactate (HALDOL) injection 4 mg  4 mg IntraMUSCular Q8H PRN    sodium chloride (NS) flush 5-40 mL  5-40 mL IntraVENous Q8H    sodium chloride (NS) flush 5-40 mL  5-40 mL IntraVENous PRN    acetaminophen (TYLENOL) tablet 650 mg  650 mg Oral Q6H PRN    Or    acetaminophen (TYLENOL) suppository 650 mg  650 mg Rectal Q6H PRN    polyethylene glycol (MIRALAX) packet 17 g  17 g Oral DAILY PRN    ondansetron (ZOFRAN ODT) tablet 4 mg  4 mg Oral Q8H PRN    Or    ondansetron (ZOFRAN) injection 4 mg  4 mg IntraVENous Q6H PRN    enoxaparin (LOVENOX) injection 40 mg  40 mg SubCUTAneous DAILY     ______________________________________________________________________  EXPECTED LENGTH OF STAY: 2d 19h  ACTUAL LENGTH OF STAY:          11                 David M Milligram, PA-C

## 2023-03-04 NOTE — PROGRESS NOTES
Bedside and Verbal shift change report given to 5[address] Christopher Zhou (oncoming nurse) by Jez Grubbs (offgoing nurse). Report included the following information SBAR and MAR.

## 2023-03-04 NOTE — PROGRESS NOTES
Pt. Became agitated, verbally and physically combative and uncooperative. Refuses to take vitals. Haldol 4mg IM given PRN on left deltoid. Assisted safely back in bed with the sitter, kept monitored and comfortable.

## 2023-03-05 NOTE — PROGRESS NOTES
At 2020-pt found with left knee on floor, and right leg over bedside rail. I hoisted pt back over bed rail . Pt has a bleeding scratch on rt calf. Notified [facility] NP . Pt did not appear to be injured, beside scratch. Pt had received Haldol, seroquel at 2000. Sitter left at 2000, and no one available to sit.  Pt was lying in bed, at 2015, when last checked on

## 2023-03-05 NOTE — PROGRESS NOTES
[facility] [facility] Adult  Hospitalist Group                                                                                          Hospitalist Progress Note  Flavio Goel NP  Answering service: [phone] from in house phone        Date of Service:  3/5/2023  NAME:  Adan May  :  1933  MRN:  [id number]       Admission Summary:   Adan May is a 80 y.o. male with a PMH of BPH, Melanoma, HTN, GERD, HLD, and AFIB who presented from  [address] with AMS and aggressive behavior x3 days and was admitted for acute encephalopathy 2/2 to Acute Cystitis. History given by patients son who is at bedside, reported baseline patient is ambulatory independently, but has confused speech, is calm but curses and is aggressive when he has a UTI. Interval history / Subjective: Follow-up for issues listed below.   -Patient seen and examined, no c/o's. Assessment & Plan:     Fall: 3/4- pt found with left knee on floor, and right leg over bedside rail, assisted back to bed by nurse. - bleeding scratch on right calf, keep site clean and dry  - continue sitter  - bed alarm    Acute Encephalopathy  to UTI/ Chronic Dementia: intermittent breakthrough agitation. - CT Head wo :no acute process. - Ur :  Moderate leukocytes  - IV ABT ceftriaxone completed   - Hospice    Dementia with behavioral disturbance/Impulsive: Comfort Measures  - continue aricept and trazodone   - Psych  - sitter at bedside   - admit to hospice  - seroquel adjusted to 50mg TID after fall/impulsive behavior    DVTppx: lovenox  GIppx: Pepcid  Code Status: DNR, comfort care  Diet: dysphagia  Activity: OOB to chair TID and PRN  Discharge: TBD  Ambulates: independent  Admission Status: IP  Cardiac monitoring: None     Hospital Problems  Date Reviewed: 2017            Codes Class Noted POA    Acute encephalopathy ICD-10-CM: G93.40  ICD-9-CM: 348.30  2023 Unknown         Social Determinants of Health     Tobacco Use: Medium Risk    Smoking Tobacco Use: Former    Smokeless Tobacco Use: Unknown    Passive Exposure: Not on file   Alcohol Use: Not on file   Financial Resource Strain: Not on file   Food Insecurity: Not on file   Transportation Needs: Not on file   Physical Activity: Not on file   Stress: Not on file   Social Connections: Not on file   Intimate Partner Violence: Not on file   Depression: Not on file   Housing Stability: Not on file       Review of Systems:   Review of systems not obtained due to patient factors. Vital Signs:    Last 24hrs VS reviewed since prior progress note. Most recent are:  Visit Vitals  BP (!) 103/51 (BP 1 Location: Left lower arm)   Pulse 67   Temp 97.3 °F (36.3 °C)   Resp 16   Ht 6' (1.829 m)   Wt 78.3 kg (172 lb 9.6 oz)   SpO2 95%   BMI 23.41 kg/m²       No intake or output data in the 24 hours ending 03/05/23 1059       Physical Examination:     I had a face to face encounter with this patient and independently examined them on 3/5/2023 as outlined below:    Constitutional:  No acute distress, cooperative, pleasant    ENT:  Oral mucosa moist.    Resp:  CTA bilaterally. No wheezing/rhonchi/rales. No accessory muscle use. CV:  Regular rhythm, normal rate, S1,S2.    GI/:  Soft, non distended, non tender, no guarding, BS present. Voids Freely. Musculoskeletal:  No edema, warm. Neurologic:  Moves all extremities. Awake and alert, oriented to person. Skin:  w/d. Psych:  Not anxious nor agitated. Data Review:    I personally reviewed  Image      I have personally and independently reviewed all pertinent labs, diagnostic studies, imaging, and have provided independent interpretation of the same. Labs:     No results for input(s): WBC, HGB, HCT, PLT, HGBEXT, HCTEXT, PLTEXT, HGBEXT, HCTEXT, PLTEXT in the last 72 hours. No results for input(s): NA, K, CL, CO2, BUN, CREA, GLU, CA, MG, PHOS, URICA in the last 72 hours.     No results for input(s): ALT, AP, TBIL, TBILI, TP, ALB, GLOB, GGT, AML, LPSE in the last 72 hours. No lab exists for component: SGOT, GPT, AMYP, HLPSE    No results for input(s): INR, PTP, APTT, INREXT, INREXT in the last 72 hours. No results for input(s): FE, TIBC, PSAT, FERR in the last 72 hours. No results found for: FOL, RBCF   No results for input(s): PH, PCO2, PO2 in the last 72 hours. No results for input(s): CPK, CKNDX, TROIQ in the last 72 hours. No lab exists for component: CPKMB  No results found for: CHOL, CHOLX, CHLST, CHOLV, HDL, HDLP, LDL, LDLC, DLDLP, TGLX, TRIGL, TRIGP, CHHD, CHHDX  Lab Results   Component Value Date/Time    Glucose (POC) 109 04/26/2022 11:02 AM    Glucose (POC) 93 04/25/2022 07:22 AM    Glucose  (A) 10/09/2013 10:42 AM    Glucose  (A) 06/04/2013 10:16 AM    Glucose  (A) 09/26/2012 09:05 AM     Lab Results   Component Value Date/Time    Color YELLOW/STRAW 02/22/2023 08:43 AM    Appearance CLEAR 02/22/2023 08:43 AM    Specific gravity 1.014 02/22/2023 08:43 AM    pH (UA) 7.0 02/22/2023 08:43 AM    Protein Negative 02/22/2023 08:43 AM    Glucose Negative 02/22/2023 08:43 AM    Ketone Negative 02/22/2023 08:43 AM    Bilirubin Negative 02/22/2023 08:43 AM    Urobilinogen 0.2 02/22/2023 08:43 AM    Nitrites Negative 02/22/2023 08:43 AM    Leukocyte Esterase MODERATE (A) 02/22/2023 08:43 AM    Epithelial cells FEW 02/22/2023 08:43 AM    Bacteria Negative 02/22/2023 08:43 AM    WBC  02/22/2023 08:43 AM    RBC 0-5 02/22/2023 08:43 AM       Notes reviewed from all clinical/nonclinical/nursing services involved in patient's clinical care. Care coordination discussions were held with appropriate clinical/nonclinical/ nursing providers based on care coordination needs. Patients current active Medications were reviewed, considered, added and adjusted based on the clinical condition today.       Home Medications were reconciled to the best of my ability given all available resources at the time of admission. Route is PO if not otherwise noted.       Admission Status:[id number]:::1}      Medications Reviewed:     Current Facility-Administered Medications   Medication Dose Route Frequency    QUEtiapine (SEROquel) tablet 50 mg  50 mg Oral BID    sertraline (ZOLOFT) tablet 25 mg  25 mg Oral DAILY    traZODone (DESYREL) tablet 25 mg  25 mg Oral BID    traZODone (DESYREL) tablet 25 mg  25 mg Oral Q6H PRN    melatonin tablet 3 mg  3 mg Oral QHS    traZODone (DESYREL) tablet 100 mg  100 mg Oral QHS    donepeziL (ARICEPT) tablet 10 mg  10 mg Oral PCD    haloperidol lactate (HALDOL) injection 4 mg  4 mg IntraMUSCular Q8H PRN    sodium chloride (NS) flush 5-40 mL  5-40 mL IntraVENous Q8H    sodium chloride (NS) flush 5-40 mL  5-40 mL IntraVENous PRN    acetaminophen (TYLENOL) tablet 650 mg  650 mg Oral Q6H PRN    Or    acetaminophen (TYLENOL) suppository 650 mg  650 mg Rectal Q6H PRN    polyethylene glycol (MIRALAX) packet 17 g  17 g Oral DAILY PRN    ondansetron (ZOFRAN ODT) tablet 4 mg  4 mg Oral Q8H PRN    Or    ondansetron (ZOFRAN) injection 4 mg  4 mg IntraVENous Q6H PRN    enoxaparin (LOVENOX) injection 40 mg  40 mg SubCUTAneous DAILY     ______________________________________________________________________  EXPECTED LENGTH OF STAY: 2d 19h  ACTUAL LENGTH OF STAY:          12                 Sarah Dumont NP

## 2023-03-05 NOTE — PROGRESS NOTES
Bedside shift change report given to Dorita Hurd (oncoming nurse) by Yeison Varela RN (offgoing nurse). Report included the following information SBAR, Kardex, Intake/Output, and MAR.

## 2023-03-06 NOTE — HOSPICE
[facility]  [phone]     Patient Name: Kareem Sheikh  YOB: 1933  Age: 80 y.o. [facility] RN Note:  Hospice bedside assessment. Per sitter he ate well for her this AM, no agitation today, more lethargic. CM is working on placement back to [employer]. We will wait to hear so that hospice admission can be planned. Thank you for the opportunity to be of service to this patient.      Anshul Rascon RN  Clinical Nurse Liaison   [facility]  Z)[phone] (i) [phone]

## 2023-03-06 NOTE — HOSPICE
[address] For Admission  Note:    This LMSW met with pt and family for hospice consult.      Psychosocial needs; will return to [facility]    Pt/caregiver; sons Deisy Adler and Kayla Crane    Pt is DDNR; on file    Home admission is scheduled for tomorrow 3/7    [place], [facility]    [phone]

## 2023-03-06 NOTE — PROGRESS NOTES
Transition of Care: Plan for return to the [facility] memory care with [facility] tomorrow. [facility] will need to make one time contract with [facility]. Noted patient currently has sitter. The facility does not require patient be sitter free. [facility] phone [phone] transport set for 1:30 PM tomorrow. Sons are Ethan Ferrell, [phone] and Lien Dial #[phone]     CM spoke with Marie Forbes at the [facility] [phone], they will discuss with their DON regarding possible acceptance today. 11:50 AM: CM spoke with Marie Forbes, they want to see how patient does off haldol for one more day and then they can ccept patient tomorrow. CM updated attending. CM spoke with [facility] [phone]. They need to know what time [facility] plans to accept patient tomorrow so they can secure hospice admit time. CM called Marie Forbes at the [facility] and left .    3:00 PM: CM called Marie Forbes again to find out about admission time for tomorrow, left  message. CM spoke with Marie Forbes, they can accept patient anytime after lunch tomorrow. CM spoke with [facility] with hospice, they have a 3 PM admission time planned for tomorrow. CM will arrange transport for 1:30 PM.      CM updated the son at bedside. Medicare pt has received, reviewed, and signed 2nd IM letter informing them of their right to appeal the discharge. Signed copy has been placed on pt bedside chart.       LEXIE Argueta/CRM'

## 2023-03-06 NOTE — PROGRESS NOTES
[address] [facility] Adult  Hospitalist Group                                                                                          Hospitalist Progress Note  Pia Presley NP  Answering service: [phone] from in house phone        Date of Service:  3/6/2023  NAME:  Smith Patino  :  1933  MRN:  [id number]       Admission Summary:   Smith Patino is a 80 y.o. male with a PMH of BPH, Melanoma, HTN, GERD, HLD, and AFIB who presented from  [address] with AMS and aggressive behavior x3 days and was admitted for acute encephalopathy 2/2 to Acute Cystitis. History given by patients son who is at bedside, reported baseline patient is ambulatory independently, but has confused speech, is calm but curses and is aggressive when he has a UTI. Interval history / Subjective: Follow-up for issues listed below.   -Patient seen and examined, no c/o's. No noted overnight events     Assessment & Plan:     Fall: 3/- pt found with left knee on floor, and right leg over bedside rail, assisted back to bed by nurse. - bleeding scratch on right calf, keep site clean and dry  - continue sitter  - bed alarm    Acute Encephalopathy 2 to UTI/ Chronic Dementia: intermittent breakthrough agitation. - CT Head wo :no acute process. - Ur :  Moderate leukocytes  - IV ABT ceftriaxone completed   - Hospice    Dementia with behavioral disturbance/Impulsive: Comfort Measures  - continue aricept and trazodone   - Psych  - sitter at bedside   - admitted to hospice  - seroquel adjusted to 50mg TID after fall/impulsive behavior    DVTppx: lovenox  GIppx: Pepcid  Code Status: DNR, comfort care  Diet: dysphagia  Activity: OOB to chair TID and PRN  Discharge: TBD  Ambulates: independent  Admission Status: IP  Cardiac monitoring: None     Hospital Problems  Date Reviewed: 2017            Codes Class Noted POA    Acute encephalopathy ICD-10-CM: G93.40  ICD-9-CM: 348.30  2023 Unknown         Social Determinants of Health     Tobacco Use: Medium Risk    Smoking Tobacco Use: Former    Smokeless Tobacco Use: Unknown    Passive Exposure: Not on file   Alcohol Use: Not on file   Financial Resource Strain: Not on file   Food Insecurity: Not on file   Transportation Needs: Not on file   Physical Activity: Not on file   Stress: Not on file   Social Connections: Not on file   Intimate Partner Violence: Not on file   Depression: Not on file   Housing Stability: Not on file       Review of Systems:   Review of systems not obtained due to patient factors. Vital Signs:    Last 24hrs VS reviewed since prior progress note. Most recent are:  Visit Vitals  /64 (BP 1 Location: Left upper arm)   Pulse 72   Temp 97.4 °F (36.3 °C)   Resp 16   Ht 6' (1.829 m)   Wt 78.3 kg (172 lb 9.6 oz)   SpO2 95%   BMI 23.41 kg/m²       No intake or output data in the 24 hours ending 03/06/23 0734       Physical Examination:     I had a face to face encounter with this patient and independently examined them on 3/6/2023 as outlined below:    Constitutional:  No acute distress, cooperative, pleasant    ENT:  Oral mucosa moist.    Resp:  CTA bilaterally. No wheezing/rhonchi/rales. No accessory muscle use. CV:  Regular rhythm, normal rate, S1,S2.    GI/:  Soft, non distended, non tender, no guarding, BS present. Voids Freely. Musculoskeletal:  No edema, warm. Neurologic:  Moves all extremities. Awake and alert, oriented to person. Skin:  w/d. Psych:  Not anxious nor agitated. Data Review:    I personally reviewed  Image      I have personally and independently reviewed all pertinent labs, diagnostic studies, imaging, and have provided independent interpretation of the same. Labs:     No results for input(s): WBC, HGB, HCT, PLT, HGBEXT, HCTEXT, PLTEXT, HGBEXT, HCTEXT, PLTEXT in the last 72 hours.     No results for input(s): NA, K, CL, CO2, BUN, CREA, GLU, CA, MG, PHOS, URICA in the last 72 hours. No results for input(s): ALT, AP, TBIL, TBILI, TP, ALB, GLOB, GGT, AML, LPSE in the last 72 hours. No lab exists for component: SGOT, GPT, AMYP, HLPSE    No results for input(s): INR, PTP, APTT, INREXT, INREXT in the last 72 hours. No results for input(s): FE, TIBC, PSAT, FERR in the last 72 hours. No results found for: FOL, RBCF   No results for input(s): PH, PCO2, PO2 in the last 72 hours. No results for input(s): CPK, CKNDX, TROIQ in the last 72 hours. No lab exists for component: CPKMB  No results found for: CHOL, CHOLX, CHLST, CHOLV, HDL, HDLP, LDL, LDLC, DLDLP, TGLX, TRIGL, TRIGP, CHHD, CHHDX  Lab Results   Component Value Date/Time    Glucose (POC) 109 04/26/2022 11:02 AM    Glucose (POC) 93 04/25/2022 07:22 AM    Glucose  (A) 10/09/2013 10:42 AM    Glucose  (A) 06/04/2013 10:16 AM    Glucose  (A) 09/26/2012 09:05 AM     Lab Results   Component Value Date/Time    Color YELLOW/STRAW 02/22/2023 08:43 AM    Appearance CLEAR 02/22/2023 08:43 AM    Specific gravity 1.014 02/22/2023 08:43 AM    pH (UA) 7.0 02/22/2023 08:43 AM    Protein Negative 02/22/2023 08:43 AM    Glucose Negative 02/22/2023 08:43 AM    Ketone Negative 02/22/2023 08:43 AM    Bilirubin Negative 02/22/2023 08:43 AM    Urobilinogen 0.2 02/22/2023 08:43 AM    Nitrites Negative 02/22/2023 08:43 AM    Leukocyte Esterase MODERATE (A) 02/22/2023 08:43 AM    Epithelial cells FEW 02/22/2023 08:43 AM    Bacteria Negative 02/22/2023 08:43 AM    WBC  02/22/2023 08:43 AM    RBC 0-5 02/22/2023 08:43 AM       Notes reviewed from all clinical/nonclinical/nursing services involved in patient's clinical care. Care coordination discussions were held with appropriate clinical/nonclinical/ nursing providers based on care coordination needs. Patients current active Medications were reviewed, considered, added and adjusted based on the clinical condition today.       Home Medications were reconciled to the best of my ability given all available resources at the time of admission. Route is PO if not otherwise noted.       Admission Status:[id number]:::1}      Medications Reviewed:     Current Facility-Administered Medications   Medication Dose Route Frequency    QUEtiapine (SEROquel) tablet 50 mg  50 mg Oral TID    sertraline (ZOLOFT) tablet 25 mg  25 mg Oral DAILY    traZODone (DESYREL) tablet 25 mg  25 mg Oral BID    traZODone (DESYREL) tablet 25 mg  25 mg Oral Q6H PRN    melatonin tablet 3 mg  3 mg Oral QHS    traZODone (DESYREL) tablet 100 mg  100 mg Oral QHS    donepeziL (ARICEPT) tablet 10 mg  10 mg Oral PCD    haloperidol lactate (HALDOL) injection 4 mg  4 mg IntraMUSCular Q8H PRN    sodium chloride (NS) flush 5-40 mL  5-40 mL IntraVENous Q8H    sodium chloride (NS) flush 5-40 mL  5-40 mL IntraVENous PRN    acetaminophen (TYLENOL) tablet 650 mg  650 mg Oral Q6H PRN    Or    acetaminophen (TYLENOL) suppository 650 mg  650 mg Rectal Q6H PRN    polyethylene glycol (MIRALAX) packet 17 g  17 g Oral DAILY PRN    ondansetron (ZOFRAN ODT) tablet 4 mg  4 mg Oral Q8H PRN    Or    ondansetron (ZOFRAN) injection 4 mg  4 mg IntraVENous Q6H PRN    enoxaparin (LOVENOX) injection 40 mg  40 mg SubCUTAneous DAILY     ______________________________________________________________________  EXPECTED LENGTH OF STAY: 2d 19h  ACTUAL LENGTH OF STAY:          LITA Singh

## 2023-03-06 NOTE — PROGRESS NOTES
Bedside and Verbal shift change report given to Peyton Brown (oncoming nurse) by Martha Calix (offgoing nurse). Report included the following information SBAR and Kardex.

## 2023-03-07 NOTE — HOSPICE
[facility]  [phone]    Hospice Nursing PRE-Admission   Discharge Summary  Patient Name: Jake Muñoz  YOB: 1933  Age: 80 y.o. Date of PLANNED Hospice Admission: 3/7/2021  Hospice Attending: Dr. Dawson Cueto  Primary Care Physician: Dot Villagomez MD     Home Hospice Address: [place] [address]  [phone]  Primary Contact and Phone:  Berny Ji, son: [phone]      ADVANCE CARE PLANNING    Code Status: DNR  Durable DNR: [x]  Yes  []  No  Advance Care Planning 2023   Patient's Healthcare Decision Maker is: -   Confirm Advance Directive Yes, on file   Patient Would Like to Complete Advance Directive Unable       Anglican: Caodaism   Home: TBD    HOSPICE SUMMARY     Verbal CTI of terminal diagnosis with life expectancy of 6 months or less received from: Dr Vince Marshall    For the Hospice Diagnosis of: senile degeneration of the brain    NCD: Requested      CLINICAL INFORMATION   Allergies: Allergies   Allergen Reactions    Lipitor [Atorvastatin] Other (comments)     arthralgia    Pcn [Penicillins] Unknown (comments)    Pravastatin Unknown (comments)         Currently this patient has:  None       Does patient have an AICD device:  [] Yes     [x] No    Has ICD been deactivated? [] Yes     [] No         COVID Screening: not tested      ASSESSMENT & PLAN     1. Symptom Issues Identified: weakness, confusion, agitation, fall risk, frequent UTIs    2. Spiritual Issues  Identified: open to  visits    3. Psych/ Social/ Emotional Issues Identified: resident at [facility]. He was hospitalized after aggressive behaviors towards staff and residents. Fount ot have UTI and acute encephalopathy. He has been restrained during his time at [facility] but not in last few days, required sitters for safety. Medications have been adjusted and appear to be more effective in managing him.  [facility] has agreed to accept him back at this time. CARE COORDINATION           Hospice Consents: signed and scanned    2. DME Ordered/Company/Delivery Plan: no needs at this time      3. Unique home needs for safety: Bariatric patient  NO    4. Symptom Kit and other Medications Needs: Facility DON requestes we fill new psych meds locally for delivery to facility 3/7 so there will be no lag time in him getting his medications:  RX3 to send in bubble packs to [facility] by 1pm 3/7:  Seroquel  Zoloft  Trazadone  Ativan      5. Home Admission Reservation: 3/7 3pm    6. Transportation by: [facility]   Scheduled for 1:30PM         7. Verbal Report/Handoff given to: Per this email and liaison will call admission nurse with update prior to transport. 8. Phone number to [facility] -2011    9.  Supplies/Wound Care: chux, diapers, barrier cream    Sylvia Aranda RN  Clinical Nurse Liaison   [facility]  C)[phone] (Z) [phone]

## 2023-03-07 NOTE — DISCHARGE INSTRUCTIONS
Discharge Instructions       PATIENT ID: Franklin Platt  MRN: [id number]   YOB: 1933    DATE OF ADMISSION: 02/21/2023   DATE OF DISCHARGE: 3/7/2023    PRIMARY CARE PROVIDER: Felecia Carrera MD     ATTENDING PHYSICIAN: Mason Cabral MD   DISCHARGING PROVIDER: Marcela Ag NP    To contact this individual call [phone] and ask the  to page. If unavailable ask to be transferred the Adult Hospitalist Department. DISCHARGE DIAGNOSIS: Fall/Acute Encephalopathy 2/2 to UTI/ Chronic Dementia behavioral disturbance/Impulsivity/FTT/Comfort Care    CONSULTATIONS: Psych    PROCEDURES/SURGERIES: * No surgery found *    PENDING TEST RESULTS:   At the time of discharge the following test results are still pending: none. FOLLOW UP APPOINTMENTS: [facility]    ADDITIONAL CARE RECOMMENDATIONS:     You have been deemed stable for discharge and are being discharged to [facility] w/ [facility] . Should you need medication refills beyond what we have prescribed for you, you will need to contact your primary care provider for refills. Return to the ER or notify your primary care office if you have a fever greater than 101.5 associated with chills, chest pain, or signs and symptoms of a stroke which are:  B E F A S T  -loss of balance, headaches or dizziness  -eyes blurred vision (sudden)  -asymmetrical facial symmetry (side of face droops)  -arms and leg weakness  -slurred speech / difficulty speaking  -if you experience any of these (time to call for an ambulance)       DIET: Regular Diet    ACTIVITY: PT/OT Eval and Treat    EQUIPMENT needed: per therapy recommendations. DISCHARGE MEDICATIONS:   See Medication Reconciliation Form    It is important that you take the medication exactly as they are prescribed. Keep your medication in the bottles provided by the pharmacist and keep a list of the medication names, dosages, and times to be taken in your wallet.    Do not take other medications without consulting your doctor. NOTIFY YOUR PHYSICIAN FOR ANY OF THE FOLLOWING:   Fever over 101 degrees for 24 hours. Chest pain, shortness of breath, fever, chills, nausea, vomiting, diarrhea, change in mentation, falling, weakness, bleeding. Severe pain or pain not relieved by medications. Or, any other signs or symptoms that you may have questions about.       DISPOSITION:    Home With:   OT  PT  HH  RN       SNF/Inpatient Rehab/LTAC    Independent/assisted living   x Hospice: [facility]     Other:     CDMP Checked:   Yes x     PROBLEM LIST Updated:  Yes x       Signed:   Bakari Burch NP  3/7/2023  11:05 AM

## 2023-03-07 NOTE — PROGRESS NOTES
Problem: Falls - Risk of  Goal: *Absence of Falls  Description: Document Ulysses Sheldon Fall Risk and appropriate interventions in the flowsheet. Outcome: Progressing Towards Goal  Note: Fall Risk Interventions:  Mobility Interventions: Bed/chair exit alarm    Mentation Interventions: Bed/chair exit alarm    Medication Interventions: Bed/chair exit alarm    Elimination Interventions: Bed/chair exit alarm     Sitter at bedside          Problem: Pressure Injury - Risk of  Goal: *Prevention of pressure injury  Description: Document Andrew Scale and appropriate interventions in the flowsheet. Outcome: Progressing Towards Goal  Note: Pressure Injury Interventions:  Sensory Interventions: Minimize linen layers    Moisture Interventions: Absorbent underpads    Activity Interventions: Assess need for specialty bed    Mobility Interventions: Turn and reposition approx.  every two hours(pillow and wedges)    Nutrition Interventions: Offer support with meals,snacks and hydration    Friction and Shear Interventions: Minimize layers

## 2023-03-07 NOTE — PROGRESS NOTES
Bedside and Verbal shift change report given to DONNA Bear (oncoming nurse) by Julissa Whittington RN (offgoing nurse). Report included the following information SBAR, Kardex, Intake/Output, MAR and Recent Results.

## 2023-03-07 NOTE — PROGRESS NOTES
Bedside shift change report given to Holly Rudd RN (oncoming nurse) by Kip Will RN (offgoing nurse). Report included the following information SBAR.

## 2023-03-07 NOTE — PROGRESS NOTES
Transition of Care: Plan for return to the [facility] care with [facility]. Noted patient currently has sitter. The facility does not require patient be sitter free. Rapid covid test required for admission, RN aware. [facility] phone [phone] transport set for 1:30 PM.     Sons are Kaylee Ngo, [phone] and Etelvina Benavides #[phone]. CM called the son Carolynn Love and notified of transport time.     Discharge folder located on hard chart to include ambulance form, RN to follow with AVS, Kardex, MAR, and call report to #[phone]    CM faxed DC papers and COVID test to F# [phone]      LEXIE Lopez/CRM

## 2023-03-07 NOTE — DISCHARGE SUMMARY
Discharge Summary       PATIENT ID: Sia Willard  MRN: [id number]   YOB: 1933    DATE OF ADMISSION: 2/21/2023  6:22 PM    DATE OF DISCHARGE: 3/7/2023     PRIMARY CARE PROVIDER: Tavares Ramos MD     ATTENDING PHYSICIAN: Uriel Corona MD  DISCHARGING PROVIDER: Juarez Dickens NP      CONSULTATIONS: IP CONSULT TO HOSPITALIST  IP CONSULT TO PSYCHIATRY  IP CONSULT TO PSYCHIATRY  IP CONSULT TO PSYCHIATRY  IP CONSULT TO PSYCHIATRY    PROCEDURES/SURGERIES: * No surgery found *    [address] [facility],[address] COURSE:     Sia Willard is a 80 y.o. male with a PMH of BPH, Melanoma, HTN, GERD, HLD, and AFIB who presented from [address] with AMS and aggressive behavior x3 days and was admitted for acute encephalopathy 2/2 to Acute Cystitis. History given by patients son who is at bedside, reported baseline patient is ambulatory independently, but has confused speech, is calm but curses and is aggressive when he has a UTI. DISCHARGE DIAGNOSES / PLAN:      DISCHARGE DIAGNOSIS: Fall/Acute Encephalopathy 2/2 to UTI/ Chronic Dementia behavioral disturbance/Impulsivity/FTT/Comfort Care    Fall: 3/4- pt found with left knee on floor, and right leg over bedside rail, assisted back to bed by nurse. - bleeding scratch on right calf, keep site clean and dry  - continue sitter  - bed alarm     Acute Encephalopathy 2/2 to UTI/ Chronic Dementia: intermittent breakthrough agitation. - CT Head wo 2/22:no acute process. - Ur 2/22: Moderate leukocytes  - IV ABT ceftriaxone completed 2/23  - Hospice     Dementia with behavioral disturbance/Impulsive: Comfort Measures  - continue aricept and trazodone   - Psych  - sitter at bedside   - admitted to hospice  - seroquel adjusted to 50mg TID after fall/impulsive behavior    BMI: Body mass index is 23.41 kg/m². . This patient: Has a BMI within normal limits.     PENDING TEST RESULTS:   At the time of discharge the following test results are still pending: none.     ADDITIONAL CARE RECOMMENDATIONS:     You have been deemed stable for discharge and are being discharged to [facility]/ [facility] . Should you need medication refills beyond what we have prescribed for you, you will need to contact your primary care provider for refills. Return to the ER or notify your primary care office if you have a fever greater than 101.5 associated with chills, chest pain, or signs and symptoms of a stroke which are:  B E F A S T  -loss of balance, headaches or dizziness  -eyes blurred vision (sudden)  -asymmetrical facial symmetry (side of face droops)  -arms and leg weakness  -slurred speech / difficulty speaking  -if you experience any of these (time to call for an ambulance)       DIET: Regular Diet    ACTIVITY: as tolerated. EQUIPMENT needed: per therapy recommendations. NOTIFY YOUR PHYSICIAN FOR ANY OF THE FOLLOWING:   Fever over 101 degrees for 24 hours. Chest pain, shortness of breath, fever, chills, nausea, vomiting, diarrhea, change in mentation, falling, weakness, bleeding. Severe pain or pain not relieved by medications, as well as any other signs or symptoms that you may have questions about. FOLLOW UP APPOINTMENTS:    Follow-up Information       Follow up With Specialties Details Why [address] Follow up  [address] [place] [address]    Dallas Villalpando MD Internal Medicine Physician   [address] [facility] [address] [place] [address]  [phone]               DISCHARGE MEDICATIONS:  Current Discharge Medication List        START taking these medications    Details   !! traZODone (DESYREL) 50 mg tablet Take 0.5 Tablets by mouth two (2) times a day. Indications: behavioral disturbances associated with dementia  Qty: 30 Tablet, Refills: 0  Start date: 3/7/2023      !! traZODone (DESYREL) 100 mg tablet Take 1 Tablet by mouth nightly.   Qty: 30 Tablet, Refills: 0  Start date: 3/7/2023       !! - Potential duplicate medications found. Please discuss with provider. CONTINUE these medications which have CHANGED    Details   sertraline (ZOLOFT) 25 mg tablet Take 1 Tablet by mouth daily. Indications: mood d/o nos  Qty: 30 Tablet, Refills: 0  Start date: 3/8/2023      QUEtiapine (SEROquel) 50 mg tablet Take 1 Tablet by mouth three (3) times daily. Qty: 30 Tablet, Refills: 0  Start date: 3/7/2023           CONTINUE these medications which have NOT CHANGED    Details   acetaminophen (TylenoL) 325 mg tablet Take 325 mg by mouth every four (4) hours as needed for Pain. Indications: fever, pain           STOP taking these medications       cephALEXin (Keflex) 500 mg capsule Comments:   Reason for Stopping:         aspirin 81 mg chewable tablet Comments:   Reason for Stopping:         memantine (Namenda) 10 mg tablet Comments:   Reason for Stopping:         senna (Senna) 8.6 mg tablet Comments:   Reason for Stopping:         vitamin a & d (A&D) ointment Comments:   Reason for Stopping:         donepezil (ARICEPT) 10 mg tablet Comments:   Reason for Stopping:         simvastatin (ZOCOR) 5 mg tablet Comments:   Reason for Stopping:               DISPOSITION:    Home With:   OT  PT  HH  RN       Long term SNF/Inpatient Rehab: Independent/assisted living   x Hospice: [facility] [place] [facility]     Other:     PATIENT CONDITION AT DISCHARGE:     Functional status   x Poor     Deconditioned     Independent      Cognition     Lucid     Forgetful    x Dementia      Catheters/lines (plus indication)    Gonzales     PICC     PEG    x None      Code status     Full code    x DNR      PHYSICAL EXAMINATION AT DISCHARGE:    Constitutional:  No acute distress, cooperative, pleasant    ENT:  Oral mucosa moist.    Resp:  CTA bilaterally. No wheezing/rhonchi/rales. No accessory muscle use.    CV:  Regular rhythm, normal rate, S1,S2.    GI/:  Soft, non distended, non tender, no guarding, BS present. Voids Freely. Musculoskeletal:  No edema, warm. Neurologic:  Moves all extremities. Awake and alert, oriented to person. Skin:  w/d. Psych:  Not anxious nor agitated.           CHRONIC MEDICAL DIAGNOSES:  Problem List as of 3/7/2023 Date Reviewed: 1/31/2017            Codes Class Noted - Resolved    Acute encephalopathy ICD-10-CM: G93.40  ICD-9-CM: 348.30  2/21/2023 - Present        Fever ICD-10-CM: R50.9  ICD-9-CM: 780.60  4/25/2022 - Present        Memory changes ICD-10-CM: R41.3  ICD-9-CM: 780.93  8/20/2015 - Present        Macular degeneration ICD-10-CM: H35.30  ICD-9-CM: 362.50  2/20/2014 - Present        BPH (benign prostatic hyperplasia) ICD-10-CM: N40.0  ICD-9-CM: 600.00  10/9/2013 - Present        GERD (gastroesophageal reflux disease) ICD-10-CM: K21.9  ICD-9-CM: 530.81  1/25/2012 - Present        DJD (degenerative joint disease) ICD-10-CM: M19.90  ICD-9-CM: 715.90  9/15/2011 - Present        HTN (hypertension) ICD-10-CM: I10  ICD-9-CM: 401.9  7/20/2011 - Present        PAF (paroxysmal atrial fibrillation) ([facility] ICD-10-CM: I48.0  ICD-9-CM: 427.31  7/20/2011 - Present        Hyperlipidemia ICD-10-CM: E78.5  ICD-9-CM: 272.4  7/20/2011 - Present        Glucose intolerance (impaired glucose tolerance) ICD-10-CM: R73.02  ICD-9-CM: 790.22  7/20/2011 - Present           Greater than 45 minutes were spent with the patient on counseling and coordination of care    Signed:   Shweta Maddox NP  3/7/2023  10:23 AM

## 2023-03-08 NOTE — HOSPICE
Brigido Laughlin  1933  79 yo, male     Principle Hospice Diagnosis: Senile degeneration of the brain  Diagnoses RELATED to the terminal prognosis: Unspecified dementia with behavioral disturbance, recurrent UTI  Unrelated Diagnosis: BPH, history of melanoma, HTN, GERD, hyperlipidemia, atrial fibrillation      Date of Hospice Admission: 3/7/2023  Hospice Attending Elected by Patient: Dr. Ga Dominguez   Primary Care Physician: Dr. Luann Salgado     Admitting RN: Hampton Rinne, RN  Nurse CM: Shyla Rucker  : Alesha Jennigns: Zakiya Lane DNR: yes, on file       Home: TBD     Direct Observation:    Received patient lying sideways on hospital bed lowered to the ground with fall pad x2 in place. Patient alert and oriented to self. He states his name is [address] [facility] but is otherwise confused and unable to participate in meaningful conversation. Patient interacting with objects that are not present. Patient verbally aggressive at times. Attempted to assist in bed and patient became increasingly agitated. Facility CNAs assisted patient with hygiene and dressing. Patient transfers to chair with 2 person assist. Per facility staff he was ambulating prior to recent hospitalization. Patient is incontinent and requires the assistance of other for ADLs. Once transferred to wheelchair, patient less agitated. His appetite appears fair. VSS, lungs clear, BS active x4, no nonverbal signs of respiratory distress noted. No nonverbal signs of pain noted. Per facility staff, patient becomes verbally aggressive when he is having pain. He receives Tylenol BID for generalized discomfort. PRN lorazepam available for anxiety. Patient also receiving Seroquel and Trazodone for management of behaviors and hallucinations. Facility requested PRN opioids not be ordered until symptoms arise. Awaiting medication delivery to facility. To be delivered today by [employer].  Encouraged facility staff to call  with questions, concerns, or changes in patient condition. Palliative Performance Scale: 40%        ER Visits/ Hospitalizations in past year: 3  Onset Date of Hospice Diagnosis: years  Summary of Disease Progression Leading to Hospice Diagnosis: Excerpted from H&P of Dr. Hannah Bishop 2/21/23:  Jb Avilez is a 80 y.o. male with a pmhx BPH, melanoma, HTN, GERD, dyslipidemia, and pAF who presents from [facility] with altered mental status, and is being admitted for acute encephalopathy due to acute cystitis. History given by patients son who is at bedside. He states that at baseline pt is ambulatory independently, but has confused speech, but usually calm, but curses. He usually becomes aggressive when he has a UTI, and patient has been aggressive for the past 72 hours   In the ED, pressure was initially 95/59 with improvement to 135/97. Other vital signs were stable. Labs were significant for UA with positive nitrites, large leukocyte, >100 WBC, and 4+ bacteria. EKG showed sinus bradycardia with PAC's. In the ED, levaquin and haldol were ordered.   Patient having recurrent UTI's and family does not wish for patient to keep going back to the hospital for treatment of infection/aggressive behaviors. Use LCD Guidelines and list features:   Patients will be considered to be in the terminal stage of dementia (life expectancy of six months or less) if they meet the following criteria. Patients with dementia should show all the following characteristics:    __x______  1. Stage seven or beyond according to the Functional Assessment Staging Scale;  __x______  2. Unable to ambulate without assistance;  __x______  3. Unable to dress without assistance;  __x______  4. Unable to bathe without assistance;  __x______  5. Urinary and fecal incontinence, intermittent or constant;  __x______  6.  No consistently meaningful verbal communication: stereotypical phrases only or the ability             to speak is limited to six or fewer intelligible words. Patients should have had one of the following within the past 12 months:    ________  1. Aspiration pneumonia;  ___x_____  2. Pyelonephritis or other upper urinary tract infection;  ________  3. Septicemia;  ________  4. Decubitus ulcers, multiple, stage 3-4;  ________  5. Fever, recurrent after antibiotics;  __x______  6. Inability to maintain sufficient fluid and calorie intake with 10% weight loss during the            previous six months or serum albumin Note: This section is specific for Alzheimer's Disease            and related disorders, and is not appropriate for other types of dementia, such as multi-           infarct dementia. SPIRITUAL/Social/Emotional/psych:     Dr. Ga Dominguez contacted, agrees to serve as attending provider for hospice and provided verbal certification of terminal illness with prognosis of 6 months or less life expectancy. Orders for hospice admission, medications and plan of treatment received. Medication reconciliation completed.         Currently this patient has:  none      MEDS:  I have reviewed the patient's medication list with MD and identified the following:  Nonformulary medications: n/a  Unrelated medications: n/a     IDT communication to include MD, SN, SW, [address] [place] [address] and support team.

## 2023-03-08 NOTE — HOSPICE
Patient is not verbal and nurse reports that patient does not get any visits.  provides empathic presence and prayer. Goals is to provide patient with empathic presence and prayer over the next two weeks.

## 2023-03-08 NOTE — HOSPICE
Patient was sitting up in a WC. Staff nurse was feeding him the remainder of his breakfast. He could not manipulate the utensils to eat. He also had difficulty holding his cup to drink, but he did not like anyone holding it for him. He ate well with his hands. Patient is a high fall risk. He frequently reached down to  little pieces of food on the floor and had to be redirected to sit up. Then he attempted many times to get out of the [facility] but his legs were not able to support him. He is able to push the [facility] around using his feet. At one point, I was standing in back of his WC and had my hand on his shoulder trying to get him to sit back down. He was cussing, pushed my hand away and quickly backed up the [facility] pushing me into a table. He was cussing, quietly, almost constantly. He was in an open area where staff could watch him constantly. His lungs were clear bilat. He allowed me initially to get his blood pressure. He was alert and oriented to self. He could not give me the name of one of his sons. After being off the floor a few minutes and returning, the staff nurse said he became agitated with another staff member and was given PRN Ativan. She stated administration was concerned about his agitation and the use of Seroquel and Trazadone as these meds may be considered a \"chemical restraint\". She stated if the patient continued with severe agitation, he may need to find another place to stay. V.S. 100/60; pulse 92 irreg.; temp 98.9; resp 18. Message left for son, Xavi Perdomo, to call back for report on the patient. Supplies ordered.

## 2023-03-09 NOTE — HOSPICE
LCSW met with pt at bedside along with bedside RN. Pt was transferred to [facility] from the [facility] where he has been a resident in the memory care unit for some time. Pt was transferred for [facility] LOC due to severe behavioral disturbances consistent with pt's end stage disease process. Pt has reportedly been agitated and aggressive, requiring medication adjustments beyond the scope of practice in his facility setting. Pt received lying in hospital bed and staring past SW. He did respond slightly when RN offered him some cola to drink and was able to take a sip, though he then burped and coughed. Pt remains somewhat agitated despite having had some medication dosages since arrival.    LCSW reached out to pt's son, Celso Harry who has been the contact throughout the day to set up pt's transfer of care. He shared that from his perspective his father  6 years ago when his memory really began to fail and his behaviors began to increase. His brother, Otf Echavarria has also been invovled in pt's care and they are both coping well with pt's decline. Celso Harry asked if pt was admitted to adjust medications and LCSW clarified that it is very possible that he will pass once he becomes comfortable. Celso Victoriameri also clarified [facility] location. Celso Victoriameri shared with CRUZITO earlier that [facility] [place] will be [facility]. LCSW will continue to follow closely for support and dc planning should pt stabilize.     WILL Schaefer, [facility]   [phone]

## 2023-03-09 NOTE — HOSPICE
LCSW met with pt at bedside along with bedside RN. Pt was transferred to [facility] from the [facility] where he has been a resident in the memory care unit for some time. Pt was transferred for [facility] LOC due to severe behavioral disturbances consistent with pt's end stage disease process. Pt has reportedly been agitated and aggressive, requiring medication adjustments beyond the scope of practice in his facility setting. Pt received lying in hospital bed and staring past SW. He did respond slightly when RN offered him some cola to drink and was able to take a sip, though he then burped and coughed. Pt remains somewhat agitated despite having had some medication dosages since arrival.    LCSW reached out to pt's son, Jp Jackson who has been the contact throughout the day to set up pt's transfer of care. He shared that from his perspective his father  6 years ago when his memory really began to fail and his behaviors began to increase. His brother, Zeno Kanner has also been invovled in pt's care and they are both coping well with pt's decline. Jp Jackson asked if pt was admitted to adjust medications and LCSW clarified that it is very possible that he will pass once he becomes comfortable. Jp Jackson also clarified [facility] location. Jp Jackson shared with CRUZITO earlier that [facility] will be [facility]. LCSW will continue to follow closely for support and dc planning should pt stabilize.

## 2023-03-09 NOTE — PROGRESS NOTES
Problem: Community Resource Needs  Goal: Patient is receiving increased resource support to enhance ability to remain at home  Outcome: Progressing Towards Goal     Problem: Emotional Support Needs  Goal: Patient/family is receiving emotional support  Outcome: Progressing Towards Goal     Problem: Falls - Risk of  Goal: *Absence of Falls  Description: Document Mi Lin Fall Risk and appropriate interventions in the flowsheet. Outcome: Progressing Towards Goal  Note: Fall Risk Interventions:                                Problem: Patient Education: Go to Patient Education Activity  Goal: Patient/Family Education  Outcome: Progressing Towards Goal     Problem: Pressure Injury - Risk of  Goal: *Prevention of pressure injury  Description: Document Andrew Scale and appropriate interventions in the flowsheet. Outcome: Progressing Towards Goal  Note: Pressure Injury Interventions:  Sensory Interventions: Assess changes in LOC, Check visual cues for pain    Moisture Interventions: Absorbent underpads, Apply protective barrier, creams and emollients, Maintain skin hydration (lotion/cream)    Activity Interventions: Pressure redistribution bed/mattress(bed type)    Mobility Interventions: Pressure redistribution bed/mattress (bed type)    Nutrition Interventions:  (Pt very lethargic.   Unable to tolerate PO intake)    Friction and Shear Interventions: HOB 30 degrees or less, Lift sheet                Problem: Patient Education: Go to Patient Education Activity  Goal: Patient/Family Education  Outcome: Progressing Towards Goal     Problem: Hospice Orientation  Goal: Demonstrate understanding of hospice philosophy, plan of care, and home hospice program  Description: The patient/family/caregiver will demonstrate understanding of hospice philosophy, plan of care and the home hospice program as evidenced by participation in meeting the patient's psychosocial, spiritual, medical, and physical needs inclusive of medical supplies/equipment focusing on symptoms. Outcome: Progressing Towards Goal     Problem: Potential for Alteration in Skin Integrity  Goal: Monitor skin for areas of alteration in skin integrity  Description: Patient/family/caregiver will demonstrate ability to care for patient's skin, monitor for areas of breakdown, and demonstrate methods to prevent breakdown during hospice care. Outcome: Progressing Towards Goal     Problem: Pain  Goal: Assess satisfaction of level of comfort and symptom control  Outcome: Progressing Towards Goal  Goal: *Control of acute pain  Outcome: Progressing Towards Goal     Problem: Anxiety/Agitation  Goal: Verbalize or staff assess the ability to manage anxiety  Description: The patient/family/caregiver will verbalize and demonstrate ability to manage the patient's anxiety throughout hospice care. Outcome: Progressing Towards Goal     Problem: End of Life Process  Goal: Demonstrate understanding of end of life processes  Description: Patient/caregiver will understand end of life processes.   Outcome: Progressing Towards Goal     Problem: Imminent Death  Goal: Collaborate with patient/family/caregiver/interdisciplinary team to minimize and manage end of life symptoms  Outcome: Progressing Towards Goal

## 2023-03-09 NOTE — PROGRESS NOTES
1320  Pt arrived at the [facility]   Pt mumbling, confused, lethargic. No intelligent conversation. Pt transferred from the stretcher to the bed. Pt grimaced. More alert and agitated. Pt BS table placed over bed. Lungs are clear. Pt is on RA. Hyperactive bowel sounds. Last reported BM is unknown. Pt is  incontinent of urine. Brief dry. No edema. Skin is  intact, arms dry and flaky. Feet cold to touch and cyanotic. Hands cool to touch. Facial skin very pale. Pt more restless with assessment. 0874   Dr Fabiana Joseph at the bedside. Ordered  Lorazepam and Morphine. Then Geodon if pt remains agitated. Piroska U. 97. in L upper arm. Pt medicated with LOrazepam and Morphine. 1340  Pt shoving the table out of way so he can get out of bed. Pt pulling at his SQ.    1341   Pt medicated with SQ Geodon. Pt pulling at the covers, wanting his pants  so he can get out. Bed alarm going off. Pt redirected. 1350  Pt continues to throw  off the covers. Rn at the bedside re orienting pt. Pt states he is not gong to fight this Rn.  RN stated I just want to  help you. Pt replied about damn time. 2460 [facility] EH at the bedside. Pt tolerated several sips of a coke. Pt stated thanks for the drink. Pt continues to pull on the covers. Kicked the side of the bed. Pulling at his SQ site. 1410  Pt medicated with Haldol. Pt picking at he covers. Rn attempted to get O2 sats. Pt stated what is the Kerwin Mis is that. Sats 88%, Hr 61. Respirations 18.     1420  SW EH at the bedside. Son Chantale Thomas notified of pt's arrival to the [facility]. Family appreciative of support and care per Angeles Ludwig. 1500  Pt resting. Respirations shallow, not labored. 1545 Pt resting. Respirations shallow, skin cool to touch. 1630  Pt continues to rest comfortably. 1740  Pt medicated with Lorazepam and Morphine prior to flores placement. Unable to place flores. Dr Fabiana Joseph also tried. No success. Depend changed.   Skin remains intact. 1800  Pt resting. Hands mottling and cool to touch. No grimacing. Respirations shallow. 1840  Pt relaxed. 1900  report given. NAME OF PATIENT:  Keyona Casiano     LEVEL OF CARE:  [facility]     REASON FOR GIP:   Pain, despite numerous changes in medications, Terminal agitation, despite changes to medications, Medication adjustment that must be monitored 24/7, and Stabilizing treatment that cannot take place at home     *PATIENT REMAINS ELIGIBLE FOR [facility] LEVEL OF CARE AS EVIDENCED BY: (MUST BE ADDRESSED OF PATIENT GIP)  Pt is very agitated. Not agreeable to SL or PO meds. Pt needs frequent nursing assessments for safety and medication administration.   .     REASON FOR RESPITE:  n/a     O2 SAFETY:  RA     FALL INTERVENTIONS PROVIDED:   Implemented/recommended use of non-skid footwear, Implemented/recommended use of fall risk identification flag to all team members, Implemented/recommended assistive devices and encouraged their use, Implemented/recommended resources for alarm system (personal alarm, bed alarm, call bell, etc.) , Implemented/recommended environmental changes (remove hazards, lower bed, improve lighting, etc.), and Implemented/recommended increased supervision/assistance     INTERDISPLINARY COMMUNICATION/COLLABORATION:  Physician, WILL, Heber De Luna, and RN, CARI     NEW MEDICATION INITIATION DOCUMENTATION:  See MAR  Consulted AT MD to report change in pt status, Obtained Order from Provider for initiation of symptom relief medication /other medication needed, and Documentation completed in Clinical Note in [facility]     Reason medication is being initiated:  Terminal agitation     MD / Provider name consulted re: change in status / initiation of new medication:  Dr Aleman Morning Symptom(s):  agitation     New Order(s):  See [facility]     Name of the person notified of the changes:  No family present     Name of person being taught:   n/a     Instructions given:  n/a     Side Effects taught:  n/a     Response to teaching:  Kylie 1429:  Is Patient/family satisfied with symptom level?  yes     DISCHARGE   Pt will remain at the [facility] for EOL. If he should  stabilize, Sw and family are looking at alternative living arrangements.

## 2023-03-09 NOTE — H&P
[address] [place] [address]   [facility]  [phone]    Patient Name: Marion Agrawal  YOB: 1933    Date of Provider Hospice Visit: 03/09/23    Level of Care:   [x] General Inpatient (GIP)    [] Routine   [] Respite    Current Location of Care:  [] [facility] [] [facility] [] [address] [] [facility] [x] Hospice House [facility] [place], patient referred from:  [] [facility] [] [facility] [] [address] [] [facility] [] Home [x] Other: [facility]      Principle Hospice Diagnosis: Senile degeneration of the brain  Diagnoses RELATED to the terminal prognosis: End-stage dementia with significant behavioral disturbances  Other Diagnoses: History of BPH, hypertension, GERD     HOSPICE SUMMARY     Marion Agrawal is a 80y.o. year old who was admitted to [address] [place] [address]. Patient is a complicated [id number]-[id number] male with end-stage dementia with significant behavioral disturbances who has been in the hospital for the last 2-1/2 weeks secondary to advanced dementia with behavioral disturbances. He was seen by psychiatry, neurology, and multiple adjustments of medications attempted with the use of trazodone 3 times a day, Seroquel 3 times a day. Work-up was done to look for reversible causes and none found to this clearly appear to be progression of his dementia. Patient had been living at the [facility] and they agreed to accept him back knowing the medication adjustments that had to be made to focus on his comfort. Since arrival back to the [facility] and admission on 3/7/2023, patient has continued to show evidence of restlessness, agitation, aggressiveness at times. Facility then becoming uncomfortable with managing symptoms and allowing certain medications. Our home team to include Kyaw Chavarria practitioner with hospice and [facility] evaluated the patient earlier today and he continued to show evidence of agitation/restlessness.   Patient appears to be at the advanced ages of his dementia with challenging behavioral disturbances and a facility struggling managing the symptoms. Patient transition to the community hospice house for [facility] level care as he likely is transitioning towards end-of-life with his advanced dementia. The patient's principle diagnosis has resulted in agitation/restlessness/aggressiveness  Refer to LCD     Functionally, the patient's Karnofsky and/or Palliative Performance Scale has declined over a period of weeks to months and is estimated at 10-20. The patient is dependent on the following ADLs:    Objective information that support this patients limited prognosis includes: See note above. The patient/family chose comfort measures with the support of Hospice. HOSPICE DIAGNOSES   Active Symptoms:  1. Restlessness with agitation  2. Advanced dementia with behavioral disturbances  3. Nonverbal signs of pain  4. Hospice care     PLAN   Patient admitted under [facility] level care as he has been struggling taking medications in the facility and quite frankly the scheduled trazodone and Seroquel appear to be less and less effective. He requires frequent nursing assessment, likely IV medication management and not safe to return to a facility given behavioral disturbances. Comfort meds have been ordered to include IM Geodon, IV/subcutaneous Ativan, IV/IM Haldol, and the rest of the comfort meds as needed. Really will be difficult to know for sure his overall needs until we can evaluate over the next 12 to 24 hours but I certainly anticipate schedule medications by tomorrow morning. Comfort meds for all other symptoms  Plan reviewed with bedside nursing team  I was able to talk with patient's son-Stevie via phone. Updated him that his father looked more comfortable this afternoon after medications received here. He just wants to make sure his dad is comfortable at the end of life as he has been suffering significantly especially with this recent hospitalization.      and SW to support family needs  Disposition: Likely death at the [address] [facility] of care was reviewed in detail and agree with current plan of care    Prognosis estimated based on 03/09/23 clinical assessment is:   [x] Hours to Days    [] Days to Weeks    [] Other:    Communicated plan of care with: Hospice Case Manager; Hospice IDT; Care Team     GOALS OF CARE     Patient/Medical POA stated Goal of Care: Hospice care    [x] I have reviewed and/or updated ACP information in the Advance Care Planning Navigator. This information is available in the [address] link in the patient's chart header.     Primary Decision [facility] Agent):   Primary Decision Maker: MORELOSKASSIE - Andrew - [phone]    Primary Decision Maker: Nisa Mendes - [phone]    Resuscitation Status: DNR  If DNR is there a Durable DNR on file? : [x] Yes [] No (If no, complete Durable DNR)    HISTORY     History obtained from: Chart, home team, son    CHIEF COMPLAINT: Agitated  The patient is:   [x] Verbal  [] Nonverbal  [] Unresponsive    HPI/SUBJECTIVE: Patient confused, verbal, aggressive at times as he pushed away to bedside table       REVIEW OF SYSTEMS     The following systems were: [] reviewed  [x] unable to be reviewed    Positive ROS include:  Constitutional: fatigue, weakness, in pain, short of breath  Ears/nose/mouth/throat: increased airway secretions  Respiratory:shortness of breath, wheezing  Gastrointestinal:poor appetite, nausea, vomiting, abdominal pain, constipation, diarrhea  Musculoskeletal:pain, deformities, swelling legs  Neurologic:confusion, hallucinations, weakness  Psychiatric:anxiety, feeling depressed, poor sleep  Endocrine:     Adult Non-Verbal Pain Assessment Score: 5    Face  [] 0   No particular expression or smile  [] 1   Occasional grimace, tearing, frowning, wrinkled forehead  [x] 2   Frequent grimace, tearing, frowning, wrinkled forehead    Activity (movement)  [] 0   Lying quietly, normal position  [] 1 Seeking attention through movement or slow, cautious movement  [x] 2   Restless, excessive activity and/or withdrawal reflexes    Guarding  [] 0   Lying quietly, no positioning of hands over areas of body  [x] 1   Splinting areas of the body, tense  [] 2   Rigid, stiff    Physiology (vital signs)  [x] 0   Stable vital signs  [] 1   Change in any of the following: SBP > 20mm Hg; HR > 20/minute  [] 2   Change in any of the following: SBP > 30mm Hg; HR > 25/minute    Respiratory  [x] 0   Baseline RR/SpO2, compliant with ventilator  [] 1   RR > 10 above baseline, or 5% drop SpO2, mild asynchrony with ventilator  [] 2   RR > 20 above baseline, or 10% drop SpO2, asynchrony with ventilator     FUNCTIONAL ASSESSMENT     Palliative Performance Scale (PPS): 10-20       PSYCHOSOCIAL/SPIRITUAL ASSESSMENT     Active Problems:    * No active hospital problems.  *    Past Medical History:   Diagnosis Date    Bleeding duodenal ulcer     BPH (benign prostatic hyperplasia) 10/9/2013    Cancer (HCC)     melanoma    Carpal tunnel syndrome     DJD (degenerative joint disease)     GERD (gastroesophageal reflux disease) 1/25/2012    Glucose intolerance (impaired glucose tolerance) 7/20/2011    HTN (hypertension) 7/20/2011    Hyperlipidemia 7/20/2011    Lumbar spinal stenosis     Mastoiditis     Memory changes 8/20/2015    PAF (paroxysmal atrial fibrillation) ([facility] [address] 7/20/2011    RBBB (right bundle branch block)     Zoster       Past Surgical History:   Procedure Laterality Date    HX APPENDECTOMY      HX OTHER SURGICAL      lung biopsy     HX OTHER SURGICAL      melanoma excision    HX TONSIL AND ADENOIDECTOMY        Social History     Tobacco Use    Smoking status: Former    Smokeless tobacco: Not on file   Substance Use Topics    Alcohol use: No     Family History   Problem Relation Age of Onset    Diabetes Mother     Heart Attack Mother     Heart Disease Father     Diabetes Brother     Heart Disease Brother     Cancer Brother Allergies   Allergen Reactions    Lipitor [Atorvastatin] Other (comments)     arthralgia    Pcn [Penicillins] Unknown (comments)    Pravastatin Unknown (comments)      Current Facility-Administered Medications   Medication Dose Route Frequency    bisacodyL (DULCOLAX) suppository 10 mg  10 mg Rectal DAILY PRN    ziprasidone (GEODON) 20 mg in sterile water (preservative free) 1 mL injection  20 mg IntraMUSCular Q12H PRN    LORazepam (ATIVAN) injection 2 mg  2 mg IntraVENous Q15MIN PRN    haloperidol lactate (HALDOL) injection 5 mg  5 mg IntraVENous Q4H PRN    morphine injection 2 mg  2 mg IntraVENous Q15MIN PRN        PHYSICAL EXAM     Wt Readings from Last 3 Encounters:   03/08/23 75.5 kg (166 lb 6.4 oz)   02/24/23 78.3 kg (172 lb 9.6 oz)   02/20/23 89 kg (196 lb 3.4 oz)       Visit Vitals  BP (!) 143/75 (BP 1 Location: Left lower arm, BP Patient Position: Reclining; At rest)   Pulse (!) 53   Resp 16   SpO2 (!) 60%       Supplemental O2  [] Yes  [x] NO  Last bowel movement:     Currently this patient has:  [] Peripheral IV [] PICC  [] PORT [] ICD    [] Gonzales Catheter [] NG Tube   [] PEG Tube    [] Rectal Tube [] Drain  [x] Other: Subcutaneous    Constitutional: Ill-appearing, agitated, yelling at times  Eyes: Closed  ENMT: Slightly dry  Cardiovascular: Regular rate and rhythm  Respiratory: Clear to auscultation  Gastrointestinal: Soft  Musculoskeletal: Unremarkable  Skin: Unremarkable  Neurologic: Certainly moving both upper and lower extremities  Psychiatric: Restless and agitated  Other:       Pertinent Lab and or Imaging Tests:  Lab Results   Component Value Date/Time    Sodium 139 02/24/2023 03:24 AM    Potassium 4.4 02/24/2023 03:24 AM    Chloride 105 02/24/2023 03:24 AM    CO2 28 02/24/2023 03:24 AM    Anion gap 6 02/24/2023 03:24 AM    Glucose 89 02/24/2023 03:24 AM    BUN 17 02/24/2023 03:24 AM    Creatinine 0.81 02/24/2023 03:24 AM    BUN/Creatinine ratio 21 (H) 02/24/2023 03:24 AM    GFR est AA >60 04/29/2022 03:17 AM    GFR est non-AA >60 04/29/2022 03:17 AM    Calcium 9.0 02/24/2023 03:24 AM     Lab Results   Component Value Date/Time    Protein, total 7.2 02/21/2023 03:40 PM    Albumin 3.9 02/21/2023 03:40 PM           Total time:   Counseling / coordination time:   > 50% counseling / coordination?:

## 2023-03-09 NOTE — HOSPICE
Patient lying in bed with his eyes closed. He was reaching out for things that were not there. He would open his eyes when questions were asked, but his speech was very slurred and he did not make sense. He was very calm. Staff nurse stated he threw either a tray or cup at dinner and was given a dose of PRN Ativan. Patient's lungs were clear bilat and he had no open areas of skin. His blood pressure was low at 84/58. Pulse was 88 irregular. Pulses were diminished. Annamarie Rojas NP was at this visit. DON at the facility was spoken to. She said that if he became violent/agitated again, he would need to be sent to the hospital. Veto Jacinto asked the DON to call hospice and we could arrange to have him transfered to the [facility]. She agreed to this. Patient's family called and the transfer to the [facility] discussed. Sons agreed to transfer. Covid test done. It was negative. Transfer to [facility] set up. Transfer team arrived at 12:30pm. Patient was still calm. Medications, DNR and some clothes sent with patient. Quick report called to Berlin peters at [facility].

## 2023-03-10 NOTE — HOSPICE
NCD sent by [email or web address] to son Brian aBh at [email or web address]    Lyric Caruso, MSW, [address] [facility]   [phone]

## 2023-03-10 NOTE — PROGRESS NOTES
Problem: Community Resource Needs  Goal: Patient is receiving increased resource support to enhance ability to remain at home  Outcome: Progressing Towards Goal     Problem: Emotional Support Needs  Goal: Patient/family is receiving emotional support  Outcome: Progressing Towards Goal     Problem: Falls - Risk of  Goal: *Absence of Falls  Description: Document Redd Nielsen Fall Risk and appropriate interventions in the flowsheet. Outcome: Progressing Towards Goal  Note: Fall Risk Interventions:                                Problem: Patient Education: Go to Patient Education Activity  Goal: Patient/Family Education  Outcome: Progressing Towards Goal     Problem: Pressure Injury - Risk of  Goal: *Prevention of pressure injury  Description: Document Andrew Scale and appropriate interventions in the flowsheet. Outcome: Progressing Towards Goal  Note: Pressure Injury Interventions:  Sensory Interventions: Check visual cues for pain    Moisture Interventions: Absorbent underpads    Activity Interventions: Pressure redistribution bed/mattress(bed type)    Mobility Interventions: HOB 30 degrees or less    Nutrition Interventions: Document food/fluid/supplement intake (NPO)    Friction and Shear Interventions: Apply protective barrier, creams and emollients                Problem: Patient Education: Go to Patient Education Activity  Goal: Patient/Family Education  Outcome: Progressing Towards Goal     Problem: Hospice Orientation  Goal: Demonstrate understanding of hospice philosophy, plan of care, and home hospice program  Description: The patient/family/caregiver will demonstrate understanding of hospice philosophy, plan of care and the home hospice program as evidenced by participation in meeting the patient's psychosocial, spiritual, medical, and physical needs inclusive of medical supplies/equipment focusing on symptoms.   Outcome: Progressing Towards Goal     Problem: Potential for Alteration in Skin Integrity  Goal: Monitor skin for areas of alteration in skin integrity  Description: Patient/family/caregiver will demonstrate ability to care for patient's skin, monitor for areas of breakdown, and demonstrate methods to prevent breakdown during hospice care. Outcome: Progressing Towards Goal     Problem: Pain  Goal: Assess satisfaction of level of comfort and symptom control  Outcome: Progressing Towards Goal  Goal: *Control of acute pain  Outcome: Progressing Towards Goal     Problem: Anxiety/Agitation  Goal: Verbalize or staff assess the ability to manage anxiety  Description: The patient/family/caregiver will verbalize and demonstrate ability to manage the patient's anxiety throughout hospice care. Outcome: Progressing Towards Goal     Problem: End of Life Process  Goal: Demonstrate understanding of end of life processes  Description: Patient/caregiver will understand end of life processes.   Outcome: Progressing Towards Goal     Problem: Imminent Death  Goal: Collaborate with patient/family/caregiver/interdisciplinary team to minimize and manage end of life symptoms  Outcome: Progressing Towards Goal

## 2023-03-10 NOTE — HOSPICE
0700 Report received report from Oanh Nichols. 0730 Patient is sleeping with a relaxed face, respirations of 14. Please see flow sheet for assessment. 0830 Patient is sleeping with relaxed face. 8764 Patient is sleeping with relaxed face and shallow respirations. 1861 Patient has labored breathing with furrowed brow. PRN dose of Ativan 2 mg and morphine 2 mg given. Patient repositioned in bed. 1030 Patient is sleeping with shallow respirations. 1120 Patient is sleeping with relaxed face. 1225 Patient is sleeping with relaxed face and shallow respirations. 1240 Gave scheduled IV medicatons of Ativan and morphine. 1330 Patient is sleeping face is relaxed and respirations are shallow. 1430 Patient is sleeping with relaxed face. 1515 Patient is sleeping with relaxed face with shallow respirations. 1600 Scheduled medications of Lorazepam 2 mg and morphine 2 mg given. Patient is sleeping with shallow respirations, face is relaxed. 1700 Patient is sleeping with relaxed face and shallow respirations. 1815 Patient is sleeping with a relaxed face and shallow respirations. 1900 Report given. NAME OF PATIENT:  Azul Zafar    LEVEL OF CARE:  [facility]    REASON FOR GIP:   Pain, despite numerous changes in medications and Terminal agitation, despite changes to medications    *PATIENT REMAINS ELIGIBLE FOR GIP LEVEL OF CARE AS EVIDENCED BY: (MUST BE ADDRESSED OF PATIENT GIP)  Patient is receiving IV medications and needs frequent nursing assessments.     REASON FOR RESPITE:  NA    O2 SAFETY:  NA    FALL INTERVENTIONS PROVIDED:   Implemented/recommended use of non-skid footwear, Implemented/recommended use of fall risk identification flag to all team members, Implemented/recommended assistive devices and encouraged their use, Implemented/recommended resources for alarm system (personal alarm, bed alarm, call bell, etc.) , Implemented/recommended environmental changes (remove hazards, lower bed, improve lighting, etc.), and Implemented/recommended increased supervision/assistance    INTERDISPLINARY COMMUNICATION/COLLABORATION:  Physician and RN, CNA    NEW MEDICATION INITIATION DOCUMENTATION:  NA    Reason medication is being initiated:  NA    MD / Provider name consulted re: change in status / initiation of new medication:  NA    New Symptom(s):  NA    New Order(s):  NA    Name of the person notified of the changes:  NA    Name of person being taught:  NA    Instructions given:  NA    Side Effects taught:  NA    Response to teaching:  NA      COMFORTABLE DYING MEASURE:  Is Patient/family satisfied with symptom level?  yes    DISCHARGE PLAN:  End of life.

## 2023-03-10 NOTE — PROGRESS NOTES
1900- Report received from TRUMAN/Spesner 10 at bedside to obtain vitals. 2025- Patient lying in bed with eyes closed. Patient appears restless and mildly agitated with movement and assessment. Tremor/muscle twitching noted to left upper thigh. Lungs diminished throughout all fields to auscultation. Heart tones distant and irregular. Abdomen soft, non tender. Bowel sounds hypoactive. Skin tough and dry. Scattered bruising noted to bilateral upper and lower extremities. Patient incontinent of bowel and bladder. 22g Sub q line noted to left upper arm. Assessment complete, see flow sheet. Bed alarm on, bed in lowest position, call bell within reach. 2031- Patient medicated with subq morphine and lorazepam, see MAR. 2045- 22g IV inserted in wrist/forearm after 2 attempts. 2135- Patient resting in bed with eyes closed. Respirations shallow and unlabored. Patient noted to moved left for a few seconds. 2216- Patient lying in bed with eyes closed. Respirations shallow with periods of apnea. 2323- Patient turned and repositioned on left side by Catherine ALCALA. 2330- Patient respirations shallow with prolonged periods of apnea. Riley.Osler- Patient resting quietly in bed with eyes closed. Snoring respirations noted. 0129- Patient lying in bed with eyes closed. Patient noted to have greater than 30 second periods of apnea. 3991- Patient becoming restless, fidgeting in bed. Tapping of right foot noted. Patient medicated with prn morphine and lorazepam, see MAR.     4496- Patient appears more relaxed. Respirations shallow and unlabored. 0425- Patient lying in bed with eyes closed. Respirations shallow with periods of apnea. 36- Catherine ALCALA at bedside to provide patient with full bed bath. Patient becoming agitated and restless with movement. Patients has not voided since previous shift. Lower abdomen mildly distended.      5497- Patient medicated with prn IV morphine and lorazepam, see MAR. Attempted to place 14fr indwelling catheter with assistance of Smart Balloon. Unable to retract foreskin over tip of penis and insert catheter. Patient turned and repositioned on right side with pillows. 0630- Patient appears comfortable, respirations shallow and unlabored. 0700- Report given to Varun Ravi RN    NAME OF PATIENT:  Kathrin Bloom    LEVEL OF CARE:  GIP    REASON FOR GIP:   Pain, despite numerous changes in medications, Terminal agitation, despite changes to medications, Medication adjustment that must be monitored 24/7, and Stabilizing treatment that cannot take place at home    *PATIENT REMAINS ELIGIBLE FOR GIP LEVEL OF CARE AS EVIDENCED BY: (MUST BE ADDRESSED OF PATIENT GIP)  Patient requires frequent monitoring, assessment and IV medications for symptom management of pain, restless and agitation.      REASON FOR RESPITE:  N/a    O2 SAFETY:  N/a    FALL INTERVENTIONS PROVIDED:   Implemented/recommended use of non-skid footwear, Implemented/recommended use of fall risk identification flag to all team members, Implemented/recommended assistive devices and encouraged their use, Implemented/recommended resources for alarm system (personal alarm, bed alarm, call bell, etc.) , Implemented/recommended environmental changes (remove hazards, lower bed, improve lighting, etc.), and Implemented/recommended increased supervision/assistance    INTERDISPLINARY COMMUNICATION/COLLABORATION:  Physician, WILL, Kirsten Morris, and RN, CNA    NEW MEDICATION INITIATION DOCUMENTATION:  N/a    Reason medication is being initiated:  n/a    MD / Provider name consulted re: change in status / initiation of new medication:  n/a    New Symptom(s):  n/a    New Order(s):  n/a    Name of the person notified of the changes:  n/a    Name of person being taught:  n/a    Instructions given:  n/a    Side Effects taught:  n/a    Response to teaching:  n/a      COMFORTABLE DYING MEASURE:  Is Patient/family satisfied with symptom level?  yes    DISCHARGE PLAN:  Patient likely to pass at [facility], if symptoms can be managed patient to return to LTC.

## 2023-03-10 NOTE — PROGRESS NOTES
[address] [place] [address]   [facility]  [phone]    Patient Name: Emily Rowe  YOB: 1933    Date of Provider Hospice Visit: 03/10/23    Level of Care:   [x] General Inpatient (GIP)    [] Routine   [] Respite    Current Location of Care:  [] [facility] [] [facility] [] [address] [] [facility] [x] [facility]ice [facility], patient referred from:  [] [facility] [] [facility] [] [address] [] [facility] [] Home [x] Other: [facility]      Principle Hospice Diagnosis: Senile degeneration of the brain  Diagnoses RELATED to the terminal prognosis: End-stage dementia with significant behavioral disturbances  Other Diagnoses: History of BPH, hypertension, GERD     HOSPICE SUMMARY     Emily Rowe is a 80y.o. year old who was admitted to [address] [place] [address]. Patient is a complicated [id number] male with end-stage dementia with significant behavioral disturbances who has been in the hospital for the last 2-1/2 weeks secondary to advanced dementia with behavioral disturbances. He was seen by psychiatry, neurology, and multiple adjustments of medications attempted with the use of trazodone 3 times a day, Seroquel 3 times a day. Work-up was done to look for reversible causes and none found to this clearly appear to be progression of his dementia. Patient had been living at the [facility] and they agreed to accept him back knowing the medication adjustments that had to be made to focus on his comfort. Since arrival back to the [facility] and admission on 3/7/2023, patient has continued to show evidence of restlessness, agitation, aggressiveness at times. Facility then becoming uncomfortable with managing symptoms and allowing certain medications. Our home team to include Kailash Ivy practitioner with hospice and [facility] evaluated the patient earlier today and he continued to show evidence of agitation/restlessness.   Patient appears to be at the advanced ages of his dementia with challenging behavioral disturbances and a facility struggling managing the symptoms. Patient transition to the [facility] hosp[facility] for [facility] level care as he likely is transitioning towards end-of-life with his advanced dementia. The patient's principle diagnosis has resulted in agitation/restlessness/aggressiveness  Refer to LCD     Functionally, the patient's Karnofsky and/or Palliative Performance Scale has declined over a period of weeks to months and is estimated at 10-20. The patient is dependent on the following ADLs:    Objective information that support this patients limited prognosis includes: See note above. The patient/family chose comfort measures with the support of Hospice. HOSPICE DIAGNOSES   Active Symptoms:  1. Restlessness with agitation  2. Advanced dementia with behavioral disturbances  3. Nonverbal signs of pain  4. Hospice care     PLAN   Patient will continue [facility] level care. Patient needs ongoing frequent nursing assessments, IV medication management for symptom burden as oral/sublingual medication clearly not effective in the facility. Patient appears comfortable this morning. He had 5. Doses of morphine and 5 as needed doses of Ativan along with 1 dose of Geodon and Haldol since admission yesterday afternoon. He is having periods of apnea but no longer restless/agitated/in pain. Schedule Ativan 2 mg IV every 4 hours and every 15 minutes as needed for restlessness  Schedule morphine 2 mg IV every 4 hours for pain/shortness of breath  Comfort meds for all other symptoms. Plan reviewed with bedside nursing team  No family at bedside but we have updated patient's son-Stevie. He appreciates the care that his dad is receiving at the [facility] and wants to make sure he remains comfortable. Vish Chapman and SW to support family needs  Disposition: Likely death at the [facility]  Hospice Plan of care was reviewed in detail and agree with current plan of care    Prognosis estimated based on 03/10/23 clinical assessment is:   [x] Hours to Days    [] Days to Weeks    [] Other:    Communicated plan of care with: Hospice Case Manager; Hospice IDT; Care Team     GOALS OF CARE     Patient/Medical POA stated Goal of Care: Hospice care    [x] I have reviewed and/or updated ACP information in the Advance Care Planning Navigator. This information is available in the [address] link in the patient's chart header. Primary Decision [facility] Agent):   Primary Decision Maker: MORELOSKASSIE - Son - [phone]    Primary Decision Maker: Penny Ko - [phone]    Resuscitation Status: DNR  If DNR is there a Durable DNR on file? : [x] Yes [] No (If no, complete Durable DNR)    HISTORY     History obtained from: Chart, home team, son    CHIEF COMPLAINT: Agitated  The patient is:   [x] Verbal  [] Nonverbal  [] Unresponsive    HPI/SUBJECTIVE: Patient confused, verbal, aggressive at times as he pushed away to bedside table    3/10-patient without grimacing/restlessness/agitation.   He actually appears very calm this morning which is totally different essentially for the last 2 weeks       REVIEW OF SYSTEMS     The following systems were: [] reviewed  [x] unable to be reviewed    Positive ROS include:  Constitutional: fatigue, weakness, in pain, short of breath  Ears/nose/mouth/throat: increased airway secretions  Respiratory:shortness of breath, wheezing  Gastrointestinal:poor appetite, nausea, vomiting, abdominal pain, constipation, diarrhea  Musculoskeletal:pain, deformities, swelling legs  Neurologic:confusion, hallucinations, weakness  Psychiatric:anxiety, feeling depressed, poor sleep  Endocrine:     Adult Non-Verbal Pain Assessment Score: 5    Face  [] 0   No particular expression or smile  [x] 1   Occasional grimace, tearing, frowning, wrinkled forehead  [] 2   Frequent grimace, tearing, frowning, wrinkled forehead    Activity (movement)  [] 0   Lying quietly, normal position  [x] 1 Seeking attention through movement or slow, cautious movement  [] 2   Restless, excessive activity and/or withdrawal reflexes    Guarding  [x] 0   Lying quietly, no positioning of hands over areas of body  [] 1   Splinting areas of the body, tense  [] 2   Rigid, stiff    Physiology (vital signs)  [x] 0   Stable vital signs  [] 1   Change in any of the following: SBP > 20mm Hg; HR > 20/minute  [] 2   Change in any of the following: SBP > 30mm Hg; HR > 25/minute    Respiratory  [x] 0   Baseline RR/SpO2, compliant with ventilator  [] 1   RR > 10 above baseline, or 5% drop SpO2, mild asynchrony with ventilator  [] 2   RR > 20 above baseline, or 10% drop SpO2, asynchrony with ventilator     FUNCTIONAL ASSESSMENT     Palliative Performance Scale (PPS): 10       PSYCHOSOCIAL/SPIRITUAL ASSESSMENT     Active Problems:    * No active hospital problems.  *    Past Medical History:   Diagnosis Date    Bleeding duodenal ulcer     BPH (benign prostatic hyperplasia) 10/9/2013    Cancer (HCC)     melanoma    Carpal tunnel syndrome     DJD (degenerative joint disease)     GERD (gastroesophageal reflux disease) 1/25/2012    Glucose intolerance (impaired glucose tolerance) 7/20/2011    HTN (hypertension) 7/20/2011    Hyperlipidemia 7/20/2011    Lumbar spinal stenosis     Mastoiditis     Memory changes 8/20/2015    PAF (paroxysmal atrial fibrillation) ([facility] [place] [address] 7/20/2011    RBBB (right bundle branch block)     Zoster       Past Surgical History:   Procedure Laterality Date    HX APPENDECTOMY      HX OTHER SURGICAL      lung biopsy     HX OTHER SURGICAL      melanoma excision    HX TONSIL AND ADENOIDECTOMY        Social History     Tobacco Use    Smoking status: Former    Smokeless tobacco: Not on file   Substance Use Topics    Alcohol use: No     Family History   Problem Relation Age of Onset    Diabetes Mother     Heart Attack Mother     Heart Disease Father     Diabetes Brother     Heart Disease Brother     Cancer Brother       Allergies Allergen Reactions    Lipitor [Atorvastatin] Other (comments)     arthralgia    Pcn [Penicillins] Unknown (comments)    Pravastatin Unknown (comments)      Current Facility-Administered Medications   Medication Dose Route Frequency    LORazepam (ATIVAN) injection 2 mg  2 mg IntraVENous Q4H    morphine injection 2 mg  2 mg IntraVENous Q4H    bisacodyL (DULCOLAX) suppository 10 mg  10 mg Rectal DAILY PRN    ziprasidone (GEODON) 20 mg in sterile water (preservative free) 1 mL injection  20 mg IntraMUSCular Q12H PRN    LORazepam (ATIVAN) injection 2 mg  2 mg IntraVENous Q15MIN PRN    haloperidol lactate (HALDOL) injection 5 mg  5 mg IntraVENous Q4H PRN    morphine injection 2 mg  2 mg IntraVENous Q15MIN PRN        PHYSICAL EXAM     Wt Readings from Last 3 Encounters:   03/08/23 75.5 kg (166 lb 6.4 oz)   02/24/23 78.3 kg (172 lb 9.6 oz)   02/20/23 89 kg (196 lb 3.4 oz)       Visit Vitals  /88   Pulse 64   Temp 98.2 °F (36.8 °C)   Resp 17   SpO2 (!) 85%       Supplemental O2  [] Yes  [x] NO  Last bowel movement:     Currently this patient has:  [] Peripheral IV [] PICC  [] PORT [] ICD    [] Gonzales Catheter [] NG Tube   [] PEG Tube    [] Rectal Tube [] Drain  [x] Other: Subcutaneous    Constitutional: Ill-appearing, ashen, comfortable, no grimacing, calm  Eyes: Closed  ENMT: Slightly dry  Cardiovascular: Regular rate and rhythm  Respiratory: Clear to auscultation, no work of breathing, no accessory muscle use  Gastrointestinal: Soft  Musculoskeletal: Unremarkable  Skin: Unremarkable  Neurologic: Minimally responsive  Psychiatric: Calm  Other:       Pertinent Lab and or Imaging Tests:  Lab Results   Component Value Date/Time    Sodium 139 02/24/2023 03:24 AM    Potassium 4.4 02/24/2023 03:24 AM    Chloride 105 02/24/2023 03:24 AM    CO2 28 02/24/2023 03:24 AM    Anion gap 6 02/24/2023 03:24 AM    Glucose 89 02/24/2023 03:24 AM    BUN 17 02/24/2023 03:24 AM    Creatinine 0.81 02/24/2023 03:24 AM    BUN/Creatinine ratio 21 (H) 02/24/2023 03:24 AM    GFR est AA >60 04/29/2022 03:17 AM    GFR est non-AA >60 04/29/2022 03:17 AM    Calcium 9.0 02/24/2023 03:24 AM     Lab Results   Component Value Date/Time    Protein, total 7.2 02/21/2023 03:40 PM    Albumin 3.9 02/21/2023 03:40 PM           Total time:   Counseling / coordination time:   > 50% counseling / coordination?:

## 2023-03-10 NOTE — PROGRESS NOTES
Problem: Falls - Risk of  Goal: *Absence of Falls  Description: Document Mi Lin Fall Risk and appropriate interventions in the flowsheet. Outcome: Progressing Towards Goal  Note: Fall Risk Interventions:                                Problem: Pressure Injury - Risk of  Goal: *Prevention of pressure injury  Description: Document Andrew Scale and appropriate interventions in the flowsheet. Outcome: Progressing Towards Goal  Note: Pressure Injury Interventions:  Sensory Interventions: Assess changes in LOC, Check visual cues for pain, Float heels, Keep linens dry and wrinkle-free, Maintain/enhance activity level, Minimize linen layers, Pad between skin to skin, Pressure redistribution bed/mattress (bed type)    Moisture Interventions: Absorbent underpads, Apply protective barrier, creams and emollients, Check for incontinence Q2 hours and as needed, Maintain skin hydration (lotion/cream), Moisture barrier, Minimize layers    Activity Interventions: Pressure redistribution bed/mattress(bed type)    Mobility Interventions: HOB 30 degrees or less, Pressure redistribution bed/mattress (bed type)    Nutrition Interventions: Document food/fluid/supplement intake (npo)    Friction and Shear Interventions: Apply protective barrier, creams and emollients, HOB 30 degrees or less, Minimize layers                Problem: Pain  Goal: Assess satisfaction of level of comfort and symptom control  Outcome: Progressing Towards Goal     Problem: Anxiety/Agitation  Goal: Verbalize or staff assess the ability to manage anxiety  Description: The patient/family/caregiver will verbalize and demonstrate ability to manage the patient's anxiety throughout hospice care.   Outcome: Progressing Towards Goal

## 2023-03-11 NOTE — PROGRESS NOTES
Problem: Community Resource Needs  Goal: Patient is receiving increased resource support to enhance ability to remain at home  Outcome: Progressing Towards Goal     Problem: Emotional Support Needs  Goal: Patient/family is receiving emotional support  Outcome: Progressing Towards Goal     Problem: Falls - Risk of  Goal: *Absence of Falls  Description: Document Selam Mend Fall Risk and appropriate interventions in the flowsheet. Outcome: Progressing Towards Goal  Note: Fall Risk Interventions:                                Problem: Patient Education: Go to Patient Education Activity  Goal: Patient/Family Education  Outcome: Progressing Towards Goal     Problem: Pressure Injury - Risk of  Goal: *Prevention of pressure injury  Description: Document Andrew Scale and appropriate interventions in the flowsheet. Outcome: Progressing Towards Goal  Note: Pressure Injury Interventions:  Sensory Interventions: Check visual cues for pain    Moisture Interventions: Absorbent underpads    Activity Interventions: Pressure redistribution bed/mattress(bed type)    Mobility Interventions: HOB 30 degrees or less    Nutrition Interventions: Document food/fluid/supplement intake (NPO)    Friction and Shear Interventions: Apply protective barrier, creams and emollients                Problem: Patient Education: Go to Patient Education Activity  Goal: Patient/Family Education  Outcome: Progressing Towards Goal     Problem: Hospice Orientation  Goal: Demonstrate understanding of hospice philosophy, plan of care, and home hospice program  Description: The patient/family/caregiver will demonstrate understanding of hospice philosophy, plan of care and the home hospice program as evidenced by participation in meeting the patient's psychosocial, spiritual, medical, and physical needs inclusive of medical supplies/equipment focusing on symptoms.   Outcome: Progressing Towards Goal     Problem: Potential for Alteration in Skin Integrity  Goal: Monitor skin for areas of alteration in skin integrity  Description: Patient/family/caregiver will demonstrate ability to care for patient's skin, monitor for areas of breakdown, and demonstrate methods to prevent breakdown during hospice care. Outcome: Progressing Towards Goal     Problem: Pain  Goal: Assess satisfaction of level of comfort and symptom control  Outcome: Progressing Towards Goal  Goal: *Control of acute pain  Outcome: Progressing Towards Goal     Problem: Anxiety/Agitation  Goal: Verbalize or staff assess the ability to manage anxiety  Description: The patient/family/caregiver will verbalize and demonstrate ability to manage the patient's anxiety throughout hospice care. Outcome: Progressing Towards Goal     Problem: End of Life Process  Goal: Demonstrate understanding of end of life processes  Description: Patient/caregiver will understand end of life processes.   Outcome: Progressing Towards Goal     Problem: Imminent Death  Goal: Collaborate with patient/family/caregiver/interdisciplinary team to minimize and manage end of life symptoms  Outcome: Progressing Towards Goal     Problem: Pain  Goal: *Control of Pain  Outcome: Progressing Towards Goal  Goal: *PALLIATIVE CARE:  Alleviation of Pain  Outcome: Progressing Towards Goal     Problem: Patient Education: Go to Patient Education Activity  Goal: Patient/Family Education  Outcome: Progressing Towards Goal     Problem: Dyspnea Due to End of Life  Goal: Demonstrate understanding of and ability to manage respiratory symptoms at end of life  Outcome: Progressing Towards Goal     Problem: Communication Deficit  Goal: Effectively communicate symptoms, needs, and concerns  Outcome: Progressing Towards Goal     Problem: Imminent Death  Goal: Collaborate with patient/family/caregiver/interdisciplinary team to minimize and manage end of life symptoms  Outcome: Progressing Towards Goal

## 2023-03-11 NOTE — PROGRESS NOTES
1900 Received report from [place], [address] [facility]. Assumed care of patient. 1945 Patient lying in bed, eyes closed, softly moaning. Lung sounds diminished, clear. No facial grimacing noted. Patient incontinent of bladder and bowel. Brief worn. Patient lying on left side. Skin warm to touch. Radial and pedal pulses weak, but palpable. Bed alarm on, bed in lowest position. 2005 Scheduled Ativan and morphine administered via IV. Patient resting comfortably after administration. Moaning subsided. 2105 Patient resting with eyes closed, respirations even and unlabored. 2205 Patient has moist, non-productive cough. Eyes remained closed. Respirations even and unlabored. Raised HOB to assist with cough. 2305 Patients eyes closed, no facial grimacing noted. Respirations unlabored. 2345 Patients eyes closed, with no facial grimacing. 0022 Scheduled IV Ativan and morphine administered. Patient periodically softly moaning, no facial grimacing noted. Moist cough noted intermittently. 0105 Patients eyes closed, respirations even and unlabored. Moist cough noted. Will continue to monitor. 0205 Patients eyes closed, no facial grimacing noted. 8394 Patient having moist cough, but no secretions noted with respirations. No facial grimacing noted. 2286 Scheduled Ativan and morphine administered via IV. Patient resting with eyes closed, respirations even and unlabored. 4627 Patient continues to rest with eyes closed. Respirations even and unlabored. 1047 Patient appears comfortable with eyes closed, respirations even and unlabored. 0700 Report given to DONNA Pike.    NAME OF PATIENT:  Yesica Yevgeniybrianna    LEVEL OF CARE:  GIP    REASON FOR GIP:   Pain, despite numerous changes in medications    *PATIENT REMAINS ELIGIBLE FOR GIP LEVEL OF CARE AS EVIDENCED BY: (MUST BE ADDRESSED OF PATIENT GIP) Patient receiving IV medications and needs frequent nursing assessments.       REASON FOR RESPITE:  N/a    O2 SAFETY:  N/a    FALL INTERVENTIONS PROVIDED:   Implemented/recommended use of non-skid footwear, Implemented/recommended use of fall risk identification flag to all team members, Implemented/recommended assistive devices and encouraged their use, Implemented/recommended resources for alarm system (personal alarm, bed alarm, call bell, etc.) , Implemented/recommended environmental changes (remove hazards, lower bed, improve lighting, etc.), and Implemented/recommended increased supervision/assistance    INTERDISPLINARY COMMUNICATION/COLLABORATION:  Physician, WILL, Bethany Carreno, and RN, CNA    NEW MEDICATION INITIATION DOCUMENTATION:  N/a    Reason medication is being initiated:  n/a    MD / Provider name consulted re: change in status / initiation of new medication:  n/a    New Symptom(s):  n/a    New Order(s):  n/a    Name of the person notified of the changes:  n/a    Name of person being taught:  n/a    Instructions given:  n/a    Side Effects taught:  n/a    Response to teaching:  n/a      COMFORTABLE DYING MEASURE:  Is Patient/family satisfied with symptom level?  yes    DISCHARGE PLAN:  End of life

## 2023-03-11 NOTE — PROGRESS NOTES
[facility]   Good Help to Those in Need  [phone]    Patient Name: Low Nayak  YOB: 1933    Date of Provider Hospice Visit: 03/11/23    Level of Care:   [x] General Inpatient (GIP)    [] Routine   [] Respite    Current Location of Care:  [] [facility] [] [facility] [] [facility] [] [facility] [x] Hospice House THE [facility], patient referred from:  [] [facility] [] [facility] [] [facility] [] [facility] [] Home [x] Other: [facility]      Principle Hospice Diagnosis: Senile degeneration of the brain  Diagnoses RELATED to the terminal prognosis: End-stage dementia with significant behavioral disturbances  Other Diagnoses: History of BPH, hypertension, GERD     HOSPICE SUMMARY     Low Nayak is a 80y.o. year old who was admitted to [facility]. Patient is a complicated 65-RVEB-YSD male with end-stage dementia with significant behavioral disturbances who has been in the hospital for the last 2-1/2 weeks secondary to advanced dementia with behavioral disturbances. He was seen by psychiatry, neurology, and multiple adjustments of medications attempted with the use of trazodone 3 times a day, Seroquel 3 times a day. Work-up was done to look for reversible causes and none found to this clearly appear to be progression of his dementia. Patient had been living at the [facility] and they agreed to accept him back knowing the medication adjustments that had to be made to focus on his comfort. Since arrival back to the [facility] and admission on 3/7/2023, patient has continued to show evidence of restlessness, agitation, aggressiveness at times. Facility then becoming uncomfortable with managing symptoms and allowing certain medications. Our home team to include Deepthi Michael practitioner with hospice and [facility] evaluated the patient earlier today and he continued to show evidence of agitation/restlessness.   Patient appears to be at the advanced ages of his dementia with challenging behavioral disturbances and a facility struggling managing the symptoms. Patient transition to the community hospice house for [facility] level care as he likely is transitioning towards end-of-life with his advanced dementia. The patient's principle diagnosis has resulted in agitation/restlessness/aggressiveness  Refer to LCD     Functionally, the patient's Karnofsky and/or Palliative Performance Scale has declined over a period of weeks to months and is estimated at 10-20. The patient is dependent on the following ADLs:    Objective information that support this patients limited prognosis includes: See note above. The patient/family chose comfort measures with the support of Hospice. HOSPICE DIAGNOSES   Active Symptoms:  1. Restlessness with agitation  2. Advanced dementia with behavioral disturbances  3. Nonverbal signs of pain  4. Hospice care     PLAN   Patient will continue [facility] level care. Patient needs ongoing frequent nursing assessments, IV medication management for symptom burden as oral/sublingual medication clearly not effective in the facility. Patient appears comfortable this morning, but did require 1 dose each of PRN Morphine and Ativan this morning. In addition RN reports end of dose failure of morphine, as he is showing signs of non-verbal pain around the 3 hr lona, she has been administering his scheduled doses ~ 20 minutes. Continue scheduled Ativan 2 mg IV every 4 hours and every 15 minutes as needed for restlessness  Increase dose and frequency of scheduled morphine to 4 mg IV every 3 hours for pain/shortness of breath  Comfort meds for all other symptoms. Plan reviewed with bedside nursing team  No family at bedside today. Nurse has been in contact with son Magali Castellanos, who continues to express appreciation for the care that his dad is receiving at the hospice house and wants to make sure he remains comfortable. Geoff Bernstein and CRUZITO to support family needs  Disposition: Likely death at the hospice house  Hospice Plan of care was reviewed in detail and agree with current plan of care    Prognosis estimated based on 03/11/23 clinical assessment is:   [x] Hours to Days    [] Days to Weeks    [] Other:    Communicated plan of care with: Hospice Case Manager; Hospice IDT; Care Team     GOALS OF CARE     Patient/Medical POA stated Goal of Care: Hospice care    [x] I have reviewed and/or updated ACP information in the Advance Care Planning Navigator. This information is available in the [address] link in the patient's chart header. Primary Decision [facility] Agent):   Primary Decision Maker: MORELOSKASSIE - Son - [phone]    Primary Decision Maker: Tal Zacarias - [phone]    Resuscitation Status: DNR  If DNR is there a Durable DNR on file? : [x] Yes [] No (If no, complete Durable DNR)    HISTORY     History obtained from: Chart, home team, son    CHIEF COMPLAINT: Agitated  The patient is:   [x] Verbal  [] Nonverbal  [] Unresponsive    HPI/SUBJECTIVE: Patient confused, verbal, aggressive at times as he pushed away to bedside table    3/10-patient without grimacing/restlessness/agitation. He actually appears very calm this morning which is totally different essentially for the last 2 weeks    3/11- patient resting comfortably at time of my assessment following prn doses of his medications.          REVIEW OF SYSTEMS     The following systems were: [] reviewed  [x] unable to be reviewed    Positive ROS include:  Constitutional: fatigue, weakness, in pain, short of breath  Ears/nose/mouth/throat: increased airway secretions  Respiratory:shortness of breath, wheezing  Gastrointestinal:poor appetite, nausea, vomiting, abdominal pain, constipation, diarrhea  Musculoskeletal:pain, deformities, swelling legs  Neurologic:confusion, hallucinations, weakness  Psychiatric:anxiety, feeling depressed, poor sleep  Endocrine:     Adult Non-Verbal Pain Assessment Score: 5    Face  [] 0   No particular expression or smile  [x] 1   Occasional grimace, tearing, frowning, wrinkled forehead  [] 2   Frequent grimace, tearing, frowning, wrinkled forehead    Activity (movement)  [] 0   Lying quietly, normal position  [x] 1   Seeking attention through movement or slow, cautious movement  [] 2   Restless, excessive activity and/or withdrawal reflexes    Guarding  [x] 0   Lying quietly, no positioning of hands over areas of body  [] 1   Splinting areas of the body, tense  [] 2   Rigid, stiff    Physiology (vital signs)  [x] 0   Stable vital signs  [] 1   Change in any of the following: SBP > 20mm Hg; HR > 20/minute  [] 2   Change in any of the following: SBP > 30mm Hg; HR > 25/minute    Respiratory  [x] 0   Baseline RR/SpO2, compliant with ventilator  [] 1   RR > 10 above baseline, or 5% drop SpO2, mild asynchrony with ventilator  [] 2   RR > 20 above baseline, or 10% drop SpO2, asynchrony with ventilator     FUNCTIONAL ASSESSMENT     Palliative Performance Scale (PPS): 10       PSYCHOSOCIAL/SPIRITUAL ASSESSMENT     Active Problems:    * No active hospital problems.  *    Past Medical History:   Diagnosis Date    Bleeding duodenal ulcer     BPH (benign prostatic hyperplasia) 10/9/2013    Cancer (HCC)     melanoma    Carpal tunnel syndrome     DJD (degenerative joint disease)     GERD (gastroesophageal reflux disease) 1/25/2012    Glucose intolerance (impaired glucose tolerance) 7/20/2011    HTN (hypertension) 7/20/2011    Hyperlipidemia 7/20/2011    Lumbar spinal stenosis     Mastoiditis     Memory changes 8/20/2015    PAF (paroxysmal atrial fibrillation) ([facility] [address] 7/20/2011    RBBB (right bundle branch block)     Zoster       Past Surgical History:   Procedure Laterality Date    HX APPENDECTOMY      HX OTHER SURGICAL      lung biopsy     HX OTHER SURGICAL      melanoma excision    HX TONSIL AND ADENOIDECTOMY        Social History     Tobacco Use    Smoking status: Former    Smokeless tobacco: Not on file   Substance Use Topics    Alcohol use: No     Family History   Problem Relation Age of Onset    Diabetes Mother     Heart Attack Mother     Heart Disease Father     Diabetes Brother     Heart Disease Brother     Cancer Brother       Allergies   Allergen Reactions    Lipitor [Atorvastatin] Other (comments)     arthralgia    Pcn [Penicillins] Unknown (comments)    Pravastatin Unknown (comments)      Current Facility-Administered Medications   Medication Dose Route Frequency    glycopyrrolate (ROBINUL) injection 0.2 mg  0.2 mg IntraVENous Q4H PRN    ketorolac (TORADOL) injection 30 mg  30 mg IntraVENous Q6H PRN    morphine injection 4 mg  4 mg IntraVENous Q15MIN PRN    morphine injection 4 mg  4 mg IntraVENous Q3H    LORazepam (ATIVAN) injection 2 mg  2 mg IntraVENous Q4H    bisacodyL (DULCOLAX) suppository 10 mg  10 mg Rectal DAILY PRN    ziprasidone (GEODON) 20 mg in sterile water (preservative free) 1 mL injection  20 mg IntraMUSCular Q12H PRN    LORazepam (ATIVAN) injection 2 mg  2 mg IntraVENous Q15MIN PRN    haloperidol lactate (HALDOL) injection 5 mg  5 mg IntraVENous Q4H PRN        PHYSICAL EXAM     Wt Readings from Last 3 Encounters:   03/08/23 75.5 kg (166 lb 6.4 oz)   02/24/23 78.3 kg (172 lb 9.6 oz)   02/20/23 89 kg (196 lb 3.4 oz)       Visit Vitals  BP (!) 98/58 (BP 1 Location: Left upper arm, BP Patient Position: At rest;Lying)   Pulse 73   Temp 98.2 °F (36.8 °C)   Resp 14   SpO2 (!) 79%       Supplemental O2  [] Yes  [x] NO  Last bowel movement:     Currently this patient has:  [] Peripheral IV [] PICC  [] PORT [] ICD    [] Gonzales Catheter [] NG Tube   [] PEG Tube    [] Rectal Tube [] Drain  [x] Other: Subcutaneous    Constitutional: Ill-appearing, ashen, comfortable, no grimacing, calm  Eyes: Closed  ENMT: Slightly dry  Cardiovascular: Regular rate and rhythm  Respiratory: Clear to auscultation, no work of breathing, no accessory muscle use  Gastrointestinal: Soft  Musculoskeletal: Unremarkable  Skin: Unremarkable  Neurologic: Minimally responsive  Psychiatric: Calm      Pertinent Lab and or Imaging Tests:  Lab Results   Component Value Date/Time    Sodium 139 02/24/2023 03:24 AM    Potassium 4.4 02/24/2023 03:24 AM    Chloride 105 02/24/2023 03:24 AM    CO2 28 02/24/2023 03:24 AM    Anion gap 6 02/24/2023 03:24 AM    Glucose 89 02/24/2023 03:24 AM    BUN 17 02/24/2023 03:24 AM    Creatinine 0.81 02/24/2023 03:24 AM    BUN/Creatinine ratio 21 (H) 02/24/2023 03:24 AM    GFR est AA >60 04/29/2022 03:17 AM    GFR est non-AA >60 04/29/2022 03:17 AM    Calcium 9.0 02/24/2023 03:24 AM     Lab Results   Component Value Date/Time    Protein, total 7.2 02/21/2023 03:40 PM    Albumin 3.9 02/21/2023 03:40 PM           Total time:   Counseling / coordination time:   > 50% counseling / coordination?:

## 2023-03-11 NOTE — HOSPICE
0700 Report received from [facility].  [id number] Patient is moaning and brow is furrowed. Prn dose of morphine and Ativan IV given. 0301 Patient is sleeping with relaxed face scheduled Morphine 2 mg and 2 mg of Ativan given. 0900 Patient is sleeping with relaxed face. 21  Patient is sleeping with shallow respirations, face is relaxed. 1100 Patient is sleeping with no facial grimacing. 1140 Scheduled medication of Ativan 2 mg and 2 mg of morphine given. 1200 Dr. Soy Gonzalez rounded and increased morphine dosage to 4 mg.  1240 Patient is moaning and facial grimacing. PRN dose of Morphine 4 mg and ATivan 2 mg given. 1330 Patient is sleeping with relaxed face and shallow respirations. 1430 Patient is sleeping with no facial grimacing. 1500 Patient is sleeping with a relaxed face, scheduled medication of Ativan 2 mg and morphine 4 mg IV.   1600 Patient is sleeping with relaxed face. 1700 Patient is sleeping with a relaxed face and shallow respirations. 1805 Patient is sleeping with a relaxed face. Scheduled medications of Ativan 2 mg and morphine 4 mg given. 1900 Report given. NAME OF PATIENT:  Tony Fierro    LEVEL OF CARE:  GIP    REASON FOR GIP:   Pain, despite numerous changes in medications and Terminal agitation, despite changes to medications    *PATIENT REMAINS ELIGIBLE FOR GIP LEVEL OF CARE AS EVIDENCED BY: (MUST BE ADDRESSED OF PATIENT GIP)  Patient is receiving IV medications and needs frequent nursing assessments.     REASON FOR RESPITE:  NA    O2 SAFETY:  NA    FALL INTERVENTIONS PROVIDED:   Implemented/recommended use of fall risk identification flag to all team members, Implemented/recommended assistive devices and encouraged their use, Implemented/recommended resources for alarm system (personal alarm, bed alarm, call bell, etc.) , Implemented/recommended environmental changes (remove hazards, lower bed, improve lighting, etc.), and Implemented/recommended increased supervision/assistance    INTERDISPLINARY COMMUNICATION/COLLABORATION:  Physician and RN, CNA    NEW MEDICATION INITIATION DOCUMENTATION:  NA    Reason medication is being initiated:  NA    MD / Provider name consulted re: change in status / initiation of new medication:  NA    New Symptom(s):  NA    New Order(s):  NA    Name of the person notified of the changes:  NA    Name of person being taught:  NA    Instructions given:  NA  Side Effects taught:  NA    Response to teaching:  NA      COMFORTABLE DYING MEASURE:  Is Patient/family satisfied with symptom level?  yes    DISCHARGE PLAN:  End of life.

## 2023-03-11 NOTE — PROGRESS NOTES
Problem: Community Resource Needs  Goal: Patient is receiving increased resource support to enhance ability to remain at home  Outcome: Progressing Towards Goal     Problem: Emotional Support Needs  Goal: Patient/family is receiving emotional support  Outcome: Progressing Towards Goal     Problem: Falls - Risk of  Goal: *Absence of Falls  Description: Document Lala Barrera Fall Risk and appropriate interventions in the flowsheet. Outcome: Progressing Towards Goal  Note: Fall Risk Interventions:                                Problem: Patient Education: Go to Patient Education Activity  Goal: Patient/Family Education  Outcome: Progressing Towards Goal     Problem: Pressure Injury - Risk of  Goal: *Prevention of pressure injury  Description: Document Andrew Scale and appropriate interventions in the flowsheet.   Outcome: Progressing Towards Goal  Note: Pressure Injury Interventions:  Sensory Interventions: Check visual cues for pain, Float heels, Keep linens dry and wrinkle-free, Minimize linen layers    Moisture Interventions: Absorbent underpads, Check for incontinence Q2 hours and as needed    Activity Interventions: Pressure redistribution bed/mattress(bed type)    Mobility Interventions: HOB 30 degrees or less, Float heels    Nutrition Interventions: Document food/fluid/supplement intake    Friction and Shear Interventions: Apply protective barrier, creams and emollients                Problem: Patient Education: Go to Patient Education Activity  Goal: Patient/Family Education  Outcome: Progressing Towards Goal     Problem: Hospice Orientation  Goal: Demonstrate understanding of hospice philosophy, plan of care, and home hospice program  Description: The patient/family/caregiver will demonstrate understanding of hospice philosophy, plan of care and the home hospice program as evidenced by participation in meeting the patient's psychosocial, spiritual, medical, and physical needs inclusive of medical supplies/equipment focusing on symptoms. Outcome: Progressing Towards Goal     Problem: Potential for Alteration in Skin Integrity  Goal: Monitor skin for areas of alteration in skin integrity  Description: Patient/family/caregiver will demonstrate ability to care for patient's skin, monitor for areas of breakdown, and demonstrate methods to prevent breakdown during hospice care. Outcome: Progressing Towards Goal     Problem: Pain  Goal: Assess satisfaction of level of comfort and symptom control  Outcome: Progressing Towards Goal  Goal: *Control of acute pain  Outcome: Progressing Towards Goal     Problem: Anxiety/Agitation  Goal: Verbalize or staff assess the ability to manage anxiety  Description: The patient/family/caregiver will verbalize and demonstrate ability to manage the patient's anxiety throughout hospice care. Outcome: Progressing Towards Goal     Problem: End of Life Process  Goal: Demonstrate understanding of end of life processes  Description: Patient/caregiver will understand end of life processes.   Outcome: Progressing Towards Goal     Problem: Imminent Death  Goal: Collaborate with patient/family/caregiver/interdisciplinary team to minimize and manage end of life symptoms  Outcome: Progressing Towards Goal

## 2023-03-12 NOTE — PROGRESS NOTES
Report received from Loy Winn, 66 Britney GrahamSweta PRN Lorazepam and Morphine administered for loud groaning and grimace.  Assessment performed as documented. GIP LOC with hospice diagnosis of End Stage Dementia. Mouth care provided.  PRN Robinul administered for audible secretions. Groaning and grimacing no longer observed. PRN Lorazepam and Morphine appear to have been effective.  Repositioned on right side.  Scheduled Ativan and Morphine administered.  Resting quietly with eyes closed and relaxed facial expression. 0 Resting quietly with no signs of pain or discomfort noted. Secretions remain audible, but have improved since PRN Robinul was administered.  Scheduled Lorazepam and Morphine administered. 0120 Resting with eyes closed. Respirations irregular, but unlabored. [address] Time began.  0300 Resting with eyes closed. No signs of pain or discomfort noted. 4 Arrived in patient's room to administer scheduled medications and found patient unresponsive without heart or lung sounds. 0400 After two minutes of auscultation with the absence of lung or heart sounds the patient was pronounced . Simon Vincent 85 patient's son, Chantale Thomas to inform, but had to leave a voicemail message. [facility] return [facility] to patient's son Nader Jean Baptiste to advise of patient's passing. He states that he is not going to come to [facility], but Chantale Thomas may want to. Chantale Thomas works nights but will be off in one hour. Nader Jean Baptiste will attempt to reach him, but asked if we could wait until 0600 when Chantale Thomas gets off to call  home.       NAME OF PATIENT:  Diania Bloch    LEVEL OF CARE:  [facility]    REASON FOR GIP:   Pain, despite numerous changes in medications    *PATIENT REMAINS ELIGIBLE FOR GIP LEVEL OF CARE AS EVIDENCED BY: (MUST BE ADDRESSED OF PATIENT GIP)  Patient requires frequent nursing assessments as well as scheduled and PRN IV medication for management of pain, restlessness and secretions. REASON FOR RESPITE:  N/A    O2 SAFETY:  N/A patient on room air. FALL INTERVENTIONS PROVIDED:   Implemented/recommended resources for alarm system (personal alarm, bed alarm, call bell, etc.) , Implemented/recommended environmental changes (remove hazards, lower bed, improve lighting, etc.), and Implemented/recommended increased supervision/assistance    INTERDISPLINARY COMMUNICATION/COLLABORATION:  Physician, WILL, Bethany Carreno, and RNCARI    NEW MEDICATION INITIATION DOCUMENTATION:  N/A    Reason medication is being initiated:  N/A    MD / Provider name consulted re: change in status / initiation of new medication:  N/A    New Symptom(s):  N/A    New Order(s):  N/A    Name of the person notified of the changes:  N/A    Name of person being taught:  N/A    Instructions given:  N/A    Side Effects taught:  N/A    Response to teaching:  N/A      COMFORTABLE DYING MEASURE:  Is Patient/family satisfied with symptom level?  yes    DISCHARGE PLAN:  EOL vs home.

## 2023-03-12 NOTE — PROGRESS NOTES
is visiting for a routine pastoral visit. Tang Wilson was minimally responsive, but reached out to hold the 's hand.  held Tang Wilson' hand and offered a simple prayer for peace.      [id number] Jonathan Bowen M.Div, M.S, Chaim 609 available at [phone])

## 2023-03-12 NOTE — PROGRESS NOTES
Problem: Falls - Risk of  Goal: *Absence of Falls  Description: Document Keith Hy Fall Risk and appropriate interventions in the flowsheet. Outcome: Progressing Towards Goal  Note: Fall Risk Interventions:                                Problem: Pressure Injury - Risk of  Goal: *Prevention of pressure injury  Description: Document Andrew Scale and appropriate interventions in the flowsheet. Outcome: Progressing Towards Goal  Note: Pressure Injury Interventions:  Sensory Interventions: Check visual cues for pain, Float heels, Keep linens dry and wrinkle-free, Minimize linen layers    Moisture Interventions: Absorbent underpads, Apply protective barrier, creams and emollients, Minimize layers    Activity Interventions: Pressure redistribution bed/mattress(bed type)    Mobility Interventions: HOB 30 degrees or less, Float heels    Nutrition Interventions:  (NPO)    Friction and Shear Interventions: Apply protective barrier, creams and emollients, Minimize layers                Problem: Potential for Alteration in Skin Integrity  Goal: Monitor skin for areas of alteration in skin integrity  Description: Patient/family/caregiver will demonstrate ability to care for patient's skin, monitor for areas of breakdown, and demonstrate methods to prevent breakdown during hospice care. Outcome: Progressing Towards Goal     Problem: Pain  Goal: Assess satisfaction of level of comfort and symptom control  Outcome: Progressing Towards Goal  Goal: *Control of acute pain  Outcome: Progressing Towards Goal     Problem: Anxiety/Agitation  Goal: Verbalize or staff assess the ability to manage anxiety  Description: The patient/family/caregiver will verbalize and demonstrate ability to manage the patient's anxiety throughout hospice care.   Outcome: Progressing Towards Goal

## 2023-03-12 NOTE — PROGRESS NOTES
Problem: Community Resource Needs  Goal: Patient is receiving increased resource support to enhance ability to remain at home  Outcome: Resolved/Met     Problem: Emotional Support Needs  Goal: Patient/family is receiving emotional support  Outcome: Resolved/Met     Problem: Falls - Risk of  Goal: *Absence of Falls  Description: Document Aline Persaud Fall Risk and appropriate interventions in the flowsheet. 3/12/2023 0444 by David Kelly RN  Outcome: Resolved/Met  3/11/2023 2222 by David Kelly RN  Outcome: Progressing Towards Goal  Note: Fall Risk Interventions:                                Problem: Patient Education: Go to Patient Education Activity  Goal: Patient/Family Education  Outcome: Resolved/Met     Problem: Pressure Injury - Risk of  Goal: *Prevention of pressure injury  Description: Document Andrew Scale and appropriate interventions in the flowsheet.   3/12/2023 0444 by David Kelly RN  Outcome: Resolved/Met  3/11/2023 2222 by David Kelly RN  Outcome: Progressing Towards Goal  Note: Pressure Injury Interventions:  Sensory Interventions: Check visual cues for pain, Float heels, Keep linens dry and wrinkle-free, Minimize linen layers    Moisture Interventions: Absorbent underpads, Apply protective barrier, creams and emollients, Minimize layers    Activity Interventions: Pressure redistribution bed/mattress(bed type)    Mobility Interventions: HOB 30 degrees or less, Float heels    Nutrition Interventions:  (NPO)    Friction and Shear Interventions: Apply protective barrier, creams and emollients, Minimize layers                Problem: Patient Education: Go to Patient Education Activity  Goal: Patient/Family Education  Outcome: Resolved/Met     Problem: Hospice Orientation  Goal: Demonstrate understanding of hospice philosophy, plan of care, and home hospice program  Description: The patient/family/caregiver will demonstrate understanding of hospice philosophy, plan of care and the home hospice program as evidenced by participation in meeting the patient's psychosocial, spiritual, medical, and physical needs inclusive of medical supplies/equipment focusing on symptoms. Outcome: Resolved/Met     Problem: Potential for Alteration in Skin Integrity  Goal: Monitor skin for areas of alteration in skin integrity  Description: Patient/family/caregiver will demonstrate ability to care for patient's skin, monitor for areas of breakdown, and demonstrate methods to prevent breakdown during hospice care. 3/12/2023 0444 by Ducth Arellano RN  Outcome: Resolved/Met  3/11/2023 2222 by Dutch Arellano RN  Outcome: Progressing Towards Goal     Problem: Pain  Goal: Assess satisfaction of level of comfort and symptom control  3/12/2023 0444 by Dutch Arellano RN  Outcome: Resolved/Met  3/11/2023 2222 by Dutch Arellano RN  Outcome: Progressing Towards Goal  Goal: *Control of acute pain  3/12/2023 0444 by Dutch Arellano RN  Outcome: Resolved/Met  3/11/2023 2222 by Dutch Arellano RN  Outcome: Progressing Towards Goal     Problem: Anxiety/Agitation  Goal: Verbalize or staff assess the ability to manage anxiety  Description: The patient/family/caregiver will verbalize and demonstrate ability to manage the patient's anxiety throughout hospice care. 3/12/2023 0444 by Dutch Arellano RN  Outcome: Resolved/Met  3/11/2023 2222 by Dutch Arellano RN  Outcome: Progressing Towards Goal     Problem: End of Life Process  Goal: Demonstrate understanding of end of life processes  Description: Patient/caregiver will understand end of life processes.   Outcome: Resolved/Met     Problem: Imminent Death  Goal: Collaborate with patient/family/caregiver/interdisciplinary team to minimize and manage end of life symptoms  Outcome: Resolved/Met     Problem: Pain  Goal: *Control of Pain  Outcome: Resolved/Met  Goal: *PALLIATIVE CARE:  Alleviation of Pain  Outcome: Resolved/Met     Problem: Patient Education: Go to Patient Education Activity  Goal: Patient/Family Education  Outcome: Resolved/Met     Problem: Dyspnea Due to End of Life  Goal: Demonstrate understanding of and ability to manage respiratory symptoms at end of life  Outcome: Resolved/Met     Problem: Communication Deficit  Goal: Effectively communicate symptoms, needs, and concerns  Outcome: Resolved/Met     Problem: Imminent Death  Goal: Collaborate with patient/family/caregiver/interdisciplinary team to minimize and manage end of life symptoms  Outcome: Resolved/Met

## 2023-03-13 ENCOUNTER — HOME CARE VISIT (OUTPATIENT)
Dept: HOSPICE | Facility: HOSPICE | Age: 88
End: 2023-03-13
Payer: MEDICARE